# Patient Record
Sex: MALE | Race: BLACK OR AFRICAN AMERICAN | Employment: OTHER | URBAN - METROPOLITAN AREA
[De-identification: names, ages, dates, MRNs, and addresses within clinical notes are randomized per-mention and may not be internally consistent; named-entity substitution may affect disease eponyms.]

---

## 2018-05-16 ENCOUNTER — HOSPITAL ENCOUNTER (OUTPATIENT)
Dept: VASCULAR SURGERY | Age: 48
Discharge: HOME | End: 2018-05-16
Attending: PODIATRIST
Payer: MEDICARE

## 2018-05-16 ENCOUNTER — HOSPITAL ENCOUNTER (OUTPATIENT)
Dept: GENERAL RADIOLOGY | Age: 48
Discharge: HOME | End: 2018-05-16
Attending: PODIATRIST
Payer: MEDICARE

## 2018-05-16 DIAGNOSIS — E11.59 DIABETIC VASCULOPATHY (HCC): ICD-10-CM

## 2018-05-16 DIAGNOSIS — M21.371 FOOT DROP, RIGHT: ICD-10-CM

## 2018-05-16 PROCEDURE — 93923 UPR/LXTR ART STDY 3+ LVLS: CPT

## 2018-05-16 PROCEDURE — 73610 X-RAY EXAM OF ANKLE: CPT

## 2018-05-16 PROCEDURE — 73630 X-RAY EXAM OF FOOT: CPT

## 2018-05-16 NOTE — PROCEDURES
[facility]  *** FINAL REPORT ***    Name: Reinaldo Dunaway  MRN: [id number]    Outpatient  : 20 Sep 1970  HIS Order #: [id number]  [id number] [facility] Visit #: [id number]  Date: 16 May 2018    TYPE OF TEST: Peripheral Arterial Testing    REASON FOR TEST    Right Leg  Segmentals: Normal                     mmHg  Brachial  High thigh  Low thigh  Calf             240  Posterior tibial 230  Dorsalis pedis   240  Peroneal  Metatarsal  Toe pressure  Doppler:    Normal  Ankle/Brachial: 1.16    Left Leg  Segmentals: Normal                     mmHg  Brachial         207  High thigh  Low thigh  Calf             211  Posterior tibial 211  Dorsalis pedis   208  Peroneal  Metatarsal  Toe pressure  Doppler:    Normal  Ankle/Brachial: 1.02    INTERPRETATION/FINDINGS  1. No evidence of significant peripheral arterial disease at rest in  the right leg. 2. No evidence of significant peripheral arterial disease at rest in  the left leg. 3. The right ankle/brachial index is 1.16 and the left ankle/brachial  index is 1.02.    ADDITIONAL COMMENTS  Right brachial pressure was not taken due to paralysis. I have personally reviewed the data relevant to the interpretation of  this  study. TECHNOLOGIST: Santi Reyes, [facility], RVT/  Signed: 2018 11:50 AM    PHYSICIAN: Bahman Zuñiga.  Cecilia Tran MD  Signed: 2018 04:12 PM

## 2019-01-18 ENCOUNTER — APPOINTMENT (OUTPATIENT)
Dept: CT IMAGING | Age: 49
End: 2019-01-18
Attending: PHYSICIAN ASSISTANT
Payer: MEDICARE

## 2019-01-18 ENCOUNTER — HOSPITAL ENCOUNTER (EMERGENCY)
Age: 49
Discharge: HOME | End: 2019-01-18
Attending: EMERGENCY MEDICINE
Payer: MEDICARE

## 2019-01-18 VITALS
HEART RATE: 93 BPM | OXYGEN SATURATION: 97 % | DIASTOLIC BLOOD PRESSURE: 111 MMHG | HEIGHT: 71 IN | RESPIRATION RATE: 16 BRPM | TEMPERATURE: 98.4 F | SYSTOLIC BLOOD PRESSURE: 182 MMHG | WEIGHT: 240 LBS | BODY MASS INDEX: 33.6 KG/M2

## 2019-01-18 DIAGNOSIS — R10.13 ABDOMINAL PAIN, EPIGASTRIC: Primary | ICD-10-CM

## 2019-01-18 LAB
ALBUMIN SERPL-MCNC: 4.2 G/DL (ref 3.4–5)
ALBUMIN/GLOB SERPL: 0.9 {RATIO} (ref 0.8–1.7)
ALP SERPL-CCNC: 75 U/L (ref 45–117)
ALT SERPL-CCNC: 65 U/L (ref 16–61)
ANION GAP SERPL CALC-SCNC: 11 MMOL/L (ref 3–18)
AST SERPL-CCNC: 334 U/L (ref 15–37)
BASOPHILS # BLD: 0 K/UL (ref 0–0.1)
BASOPHILS NFR BLD: 0 % (ref 0–2)
BILIRUB SERPL-MCNC: 1.8 MG/DL (ref 0.2–1)
BUN SERPL-MCNC: 17 MG/DL (ref 7–18)
BUN/CREAT SERPL: 11 (ref 12–20)
CALCIUM SERPL-MCNC: 9.7 MG/DL (ref 8.5–10.1)
CHLORIDE SERPL-SCNC: 98 MMOL/L (ref 100–108)
CO2 SERPL-SCNC: 26 MMOL/L (ref 21–32)
CREAT SERPL-MCNC: 1.59 MG/DL (ref 0.6–1.3)
DIFFERENTIAL METHOD BLD: ABNORMAL
EOSINOPHIL # BLD: 0 K/UL (ref 0–0.4)
EOSINOPHIL NFR BLD: 0 % (ref 0–5)
ERYTHROCYTE [DISTWIDTH] IN BLOOD BY AUTOMATED COUNT: 13.6 % (ref 11.6–14.5)
GLOBULIN SER CALC-MCNC: 4.6 G/DL (ref 2–4)
GLUCOSE SERPL-MCNC: 255 MG/DL (ref 74–99)
HCT VFR BLD AUTO: 44.8 % (ref 36–48)
HGB BLD-MCNC: 15.4 G/DL (ref 13–16)
LYMPHOCYTES # BLD: 1.1 K/UL (ref 0.9–3.6)
LYMPHOCYTES NFR BLD: 9 % (ref 21–52)
MCH RBC QN AUTO: 28.9 PG (ref 24–34)
MCHC RBC AUTO-ENTMCNC: 34.4 G/DL (ref 31–37)
MCV RBC AUTO: 84.2 FL (ref 74–97)
MONOCYTES # BLD: 1.1 K/UL (ref 0.05–1.2)
MONOCYTES NFR BLD: 9 % (ref 3–10)
NEUTS SEG # BLD: 9.8 K/UL (ref 1.8–8)
NEUTS SEG NFR BLD: 82 % (ref 40–73)
PLATELET # BLD AUTO: 216 K/UL (ref 135–420)
PMV BLD AUTO: 11.2 FL (ref 9.2–11.8)
POTASSIUM SERPL-SCNC: 3.9 MMOL/L (ref 3.5–5.5)
PROT SERPL-MCNC: 8.8 G/DL (ref 6.4–8.2)
RBC # BLD AUTO: 5.32 M/UL (ref 4.7–5.5)
SODIUM SERPL-SCNC: 135 MMOL/L (ref 136–145)
WBC # BLD AUTO: 12 K/UL (ref 4.6–13.2)

## 2019-01-18 PROCEDURE — 96374 THER/PROPH/DIAG INJ IV PUSH: CPT

## 2019-01-18 PROCEDURE — 85025 COMPLETE CBC W/AUTO DIFF WBC: CPT

## 2019-01-18 PROCEDURE — 74011250636 HC RX REV CODE- 250/636: Performed by: EMERGENCY MEDICINE

## 2019-01-18 PROCEDURE — 99284 EMERGENCY DEPT VISIT MOD MDM: CPT

## 2019-01-18 PROCEDURE — 80053 COMPREHEN METABOLIC PANEL: CPT

## 2019-01-18 PROCEDURE — 96361 HYDRATE IV INFUSION ADD-ON: CPT

## 2019-01-18 PROCEDURE — 74011000250 HC RX REV CODE- 250: Performed by: EMERGENCY MEDICINE

## 2019-01-18 PROCEDURE — 74176 CT ABD & PELVIS W/O CONTRAST: CPT

## 2019-01-18 RX ORDER — METOPROLOL TARTRATE 5 MG/5ML
5 INJECTION INTRAVENOUS
Status: COMPLETED | OUTPATIENT
Start: 2019-01-18 | End: 2019-01-18

## 2019-01-18 RX ORDER — AMLODIPINE AND OLMESARTAN MEDOXOMIL 10; 40 MG/1; MG/1
TABLET ORAL
COMMUNITY
End: 2022-02-04

## 2019-01-18 RX ADMIN — METOPROLOL TARTRATE 5 MG: 1 INJECTION, SOLUTION INTRAVENOUS at 16:13

## 2019-01-18 RX ADMIN — SODIUM CHLORIDE 1000 ML: 900 INJECTION, SOLUTION INTRAVENOUS at 14:41

## 2019-01-18 NOTE — ED NOTES
Dr. Tamara Canchola reviewed verbal discharge instructions with the patient and spouse. The patient and spouse verbalized understanding. Pt left prior to receiving paper discharge instructions.

## 2019-01-18 NOTE — DISCHARGE INSTRUCTIONS
Patient Education      Take your prescribed medication as directed. Follow up with your primary care physician. Return to the emergency room with any new or worsening conditions. Patient Education          Diarrhea: Care Instructions  Your Care Instructions    Diarrhea is loose, watery stools (bowel movements). The exact cause is often hard to find. Sometimes diarrhea is your body's way of getting rid of what caused an upset stomach. Viruses, food poisoning, and many medicines can cause diarrhea. Some people get diarrhea in response to emotional stress, anxiety, or certain foods. Almost everyone has diarrhea now and then. It usually isn't serious, and your stools will return to normal soon. The important thing to do is replace the fluids you have lost, so you can prevent dehydration. The doctor has checked you carefully, but problems can develop later. If you notice any problems or new symptoms, get medical treatment right away. Follow-up care is a key part of your treatment and safety. Be sure to make and go to all appointments, and call your doctor if you are having problems. It's also a good idea to know your test results and keep a list of the medicines you take. How can you care for yourself at home? · Watch for signs of dehydration, which means your body has lost too much water. Dehydration is a serious condition and should be treated right away. Signs of dehydration are:  ? Increasing thirst and dry eyes and mouth. ? Feeling faint or lightheaded. ? Darker urine, and a smaller amount of urine than normal.  · To prevent dehydration, drink plenty of fluids, enough so that your urine is light yellow or clear like water. Choose water and other caffeine-free clear liquids until you feel better. If you have kidney, heart, or liver disease and have to limit fluids, talk with your doctor before you increase the amount of fluids you drink.   · Begin eating small amounts of mild foods the next day, if you feel like it. ? Try yogurt that has live cultures of Lactobacillus. (Check the label.)  ? Avoid spicy foods, fruits, alcohol, and caffeine until 48 hours after all symptoms are gone. ? Avoid chewing gum that contains sorbitol. ? Avoid dairy products (except for yogurt with Lactobacillus) while you have diarrhea and for 3 days after symptoms are gone. · The doctor may recommend that you take over-the-counter medicine, such as loperamide (Imodium), if you still have diarrhea after 6 hours. Read and follow all instructions on the label. Do not use this medicine if you have bloody diarrhea, a high fever, or other signs of serious illness. Call your doctor if you think you are having a problem with your medicine. When should you call for help? Call 911 anytime you think you may need emergency care. For example, call if:    · You passed out (lost consciousness).     · Your stools are maroon or very bloody.    Call your doctor now or seek immediate medical care if:    · You are dizzy or lightheaded, or you feel like you may faint.     · Your stools are black and look like tar, or they have streaks of blood.     · You have new or worse belly pain.     · You have symptoms of dehydration, such as:  ? Dry eyes and a dry mouth. ? Passing only a little dark urine. ? Feeling thirstier than usual.     · You have a new or higher fever.    Watch closely for changes in your health, and be sure to contact your doctor if:    · Your diarrhea is getting worse.     · You see pus in the diarrhea.     · You are not getting better after 2 days (48 hours). Where can you learn more? Go to http://tamir-eren.info/. Enter W694 in the search box to learn more about \"Diarrhea: Care Instructions. \"  Current as of: September 23, 2018  Content Version: 11.9  © 6801-6757 [employer]. Care instructions adapted under license by [employer] (which disclaims liability or warranty for this information).  If you have questions about a medical condition or this instruction, always ask your healthcare professional. [address] any warranty or liability for your use of this information. Abdominal Pain: Care Instructions  Your Care Instructions    Abdominal pain has many possible causes. Some aren't serious and get better on their own in a few days. Others need more testing and treatment. If your pain continues or gets worse, you need to be rechecked and may need more tests to find out what is wrong. You may need surgery to correct the problem. Don't ignore new symptoms, such as fever, nausea and vomiting, urination problems, pain that gets worse, and dizziness. These may be signs of a more serious problem. Your doctor may have recommended a follow-up visit in the next 8 to 12 hours. If you are not getting better, you may need more tests or treatment. The doctor has checked you carefully, but problems can develop later. If you notice any problems or new symptoms, get medical treatment right away. Follow-up care is a key part of your treatment and safety. Be sure to make and go to all appointments, and call your doctor if you are having problems. It's also a good idea to know your test results and keep a list of the medicines you take. How can you care for yourself at home? · Rest until you feel better. · To prevent dehydration, drink plenty of fluids, enough so that your urine is light yellow or clear like water. Choose water and other caffeine-free clear liquids until you feel better. If you have kidney, heart, or liver disease and have to limit fluids, talk with your doctor before you increase the amount of fluids you drink. · If your stomach is upset, eat mild foods, such as rice, dry toast or crackers, bananas, and applesauce. Try eating several small meals instead of two or three large ones.   · Wait until 48 hours after all symptoms have gone away before you have spicy foods, alcohol, and drinks that contain caffeine. · Do not eat foods that are high in fat. · Avoid anti-inflammatory medicines such as aspirin, ibuprofen (Advil, Motrin), and naproxen (Aleve). These can cause stomach upset. Talk to your doctor if you take daily aspirin for another health problem. When should you call for help? Call 911 anytime you think you may need emergency care. For example, call if:    · You passed out (lost consciousness).     · You pass maroon or very bloody stools.     · You vomit blood or what looks like coffee grounds.     · You have new, severe belly pain.    Call your doctor now or seek immediate medical care if:    · Your pain gets worse, especially if it becomes focused in one area of your belly.     · You have a new or higher fever.     · Your stools are black and look like tar, or they have streaks of blood.     · You have unexpected vaginal bleeding.     · You have symptoms of a urinary tract infection. These may include:  ? Pain when you urinate. ? Urinating more often than usual.  ? Blood in your urine.     · You are dizzy or lightheaded, or you feel like you may faint.    Watch closely for changes in your health, and be sure to contact your doctor if:    · You are not getting better after 1 day (24 hours). Where can you learn more? Go to http://tamir-eren.info/. Enter J275 in the search box to learn more about \"Abdominal Pain: Care Instructions. \"  Current as of: September 23, 2018  Content Version: 11.9  © 5706-2746 [employer]. Care instructions adapted under license by [employer] (which disclaims liability or warranty for this information). If you have questions about a medical condition or this instruction, always ask your healthcare professional. [address] any warranty or liability for your use of this information.

## 2019-01-18 NOTE — ED PROVIDER NOTES
EMERGENCY DEPARTMENT HISTORY AND PHYSICAL EXAM    2:29 PM      Date: 1/18/2019  Patient Name: Davie Almeida    History of Presenting Illness     Chief Complaint   Patient presents with    Abdominal Pain    Vomiting    Diarrhea         History Provided By: Patient and Patient's Wife    Chief Complaint: abdominal pain  Duration: 2 Days  Timing:  Acute and Constant  Location: epigastric  Quality: Aching  Severity: 6 out of 10  Modifying Factors: none  Associated Symptoms: N/V/D      Additional History (Context): Davie Almeida is a 50 y.o. male with CVA, DM, HTN, HLD who presents with acute and constant epigastric abdominal pain rated a 6/10 onset x2 days PTA. He confirms associated sx of N/V/D with 8 episodes of water diarrhea and 3 episodes of emesis yesterday; no episodes of diarrhea and 2 episodes of emesis today. He denies any CP, cough, fever or urinary sx. The epigastric pain is non radiating and does not have any modifying factors. Pt wife has been trying to keep him hydrated and states that he has not taken his medications today but is otherwise compliant. He denies any tobacco, etOH or illicit drug use. PCP is Dr. Rolly Boswell. No other concerns or symptoms at this time. PCP: None    Current Outpatient Medications   Medication Sig Dispense Refill    amLODIPine-Olmesartan 10-40 mg tab Take  by mouth.  SITagliptin-metFORMIN (JANUMET) 50-1,000 mg per tablet Take 1 Tab by mouth two (2) times daily (with meals).  metoprolol (LOPRESSOR) 50 mg tablet Take 1 Tab by mouth two (2) times a day. 60 Tab 1    HYDROcodone-acetaminophen (NORCO) 5-325 mg per tablet Take 1 Tab by mouth every four (4) hours as needed for Pain.  20 Tab 0       Past History     Past Medical History:  Past Medical History:   Diagnosis Date    CVA (cerebral vascular accident) ([facility] [address]     right-sided deficit    Diabetes ([address]     HTN (hypertension)        Past Surgical History:  Past Surgical History:   Procedure Laterality Date    HX HIP FRACTURE TX      left    HX ORTHOPAEDIC      hip       Family History:  Family History   Problem Relation Age of Onset    Stroke Neg Hx     Hypertension Neg Hx     Heart Disease Neg Hx     Diabetes Neg Hx     Cancer Neg Hx        Social History:  Social History     Tobacco Use    Smoking status: Never Smoker    Smokeless tobacco: Never Used   Substance Use Topics    Alcohol use: No    Drug use: No       Allergies: Allergies   Allergen Reactions    Grape Hives         Review of Systems       Review of Systems   Constitutional: Negative. Negative for chills, diaphoresis and fever. HENT: Negative. Negative for congestion, rhinorrhea and sore throat. Eyes: Negative. Negative for pain, discharge and redness. Respiratory: Negative. Negative for cough, chest tightness, shortness of breath and wheezing. Cardiovascular: Negative. Negative for chest pain. Gastrointestinal: Positive for abdominal pain (epigastric), diarrhea, nausea and vomiting. Negative for constipation. Genitourinary: Negative. Negative for dysuria, flank pain, frequency, hematuria and urgency. Musculoskeletal: Negative. Negative for back pain and neck pain. Skin: Negative. Negative for rash. Neurological: Negative. Negative for syncope, weakness, numbness and headaches. Psychiatric/Behavioral: Negative. All other systems reviewed and are negative. Physical Exam     Visit Vitals  BP (!) 182/111   Pulse 93   Temp 98.4 °F (36.9 °C)   Resp 16   Ht 5' 11\" (1.803 m)   Wt 108.9 kg (240 lb)   SpO2 97%   BMI 33.47 kg/m²         Physical Exam   Constitutional: He appears well-developed and well-nourished. Non-toxic appearance. He does not have a sickly appearance. He does not appear ill. No distress. HENT:   Head: Normocephalic and atraumatic. Mouth/Throat: Oropharynx is clear and moist. No oropharyngeal exudate. Eyes: Conjunctivae and EOM are normal. Pupils are equal, round, and reactive to light.  No scleral icterus. Neck: Normal range of motion. Neck supple. No hepatojugular reflux and no JVD present. No tracheal deviation present. No thyromegaly present. Cardiovascular: Normal rate, regular rhythm, S1 normal, S2 normal, normal heart sounds, intact distal pulses and normal pulses. Exam reveals no gallop, no S3 and no S4. No murmur heard. Pulses:       Radial pulses are 2+ on the right side, and 2+ on the left side. Dorsalis pedis pulses are 2+ on the right side, and 2+ on the left side. Pulmonary/Chest: Effort normal and breath sounds normal. No respiratory distress. He has no decreased breath sounds. He has no wheezes. He has no rhonchi. He has no rales. Abdominal: Soft. Normal appearance and bowel sounds are normal. He exhibits no distension and no mass. There is no hepatosplenomegaly. There is tenderness in the epigastric area. There is no rigidity, no rebound, no guarding, no CVA tenderness, no tenderness at McBurney's point and negative Vargas's sign. Musculoskeletal: Normal range of motion. Right 1/5 strength upper extremity  Right 2/5 strength lower extremity   Left sided 5/5 upper and lower    Lymphadenopathy:        Head (right side): No submental, no submandibular, no preauricular and no occipital adenopathy present. Head (left side): No submental, no submandibular, no preauricular and no occipital adenopathy present. He has no cervical adenopathy. Right: No supraclavicular adenopathy present. Left: No supraclavicular adenopathy present. Neurological: He is alert. He has normal reflexes. He is not disoriented. He displays atrophy. A sensory deficit is present. No cranial nerve deficit. Coordination and gait normal. GCS eye subscore is 4. GCS verbal subscore is 5. GCS motor subscore is 6. 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 No rash noted. He is not diaphoretic. Psychiatric: He has a normal mood and affect. His speech is normal and behavior is normal. Judgment and thought content normal. Cognition and memory are normal.   Nursing note and vitals reviewed. Diagnostic Study Results     Labs -  Recent Results (from the past 12 hour(s))   CBC WITH AUTOMATED DIFF    Collection Time: 01/18/19  2:35 PM   Result Value Ref Range    WBC 12.0 4.6 - 13.2 K/uL    RBC 5.32 4.70 - 5.50 M/uL    HGB 15.4 13.0 - 16.0 g/dL    HCT 44.8 36.0 - 48.0 %    MCV 84.2 74.0 - 97.0 FL    MCH 28.9 24.0 - 34.0 PG    MCHC 34.4 31.0 - 37.0 g/dL    RDW 13.6 11.6 - 14.5 %    PLATELET 374 914 - 637 K/uL    MPV 11.2 9.2 - 11.8 FL    NEUTROPHILS 82 (H) 40 - 73 %    LYMPHOCYTES 9 (L) 21 - 52 %    MONOCYTES 9 3 - 10 %    EOSINOPHILS 0 0 - 5 %    BASOPHILS 0 0 - 2 %    ABS. NEUTROPHILS 9.8 (H) 1.8 - 8.0 K/UL    ABS. LYMPHOCYTES 1.1 0.9 - 3.6 K/UL    ABS. MONOCYTES 1.1 0.05 - 1.2 K/UL    ABS. EOSINOPHILS 0.0 0.0 - 0.4 K/UL    ABS. BASOPHILS 0.0 0.0 - 0.1 K/UL    DF AUTOMATED     METABOLIC PANEL, COMPREHENSIVE    Collection Time: 01/18/19  2:35 PM   Result Value Ref Range    Sodium 135 (L) 136 - 145 mmol/L    Potassium 3.9 3.5 - 5.5 mmol/L    Chloride 98 (L) 100 - 108 mmol/L    CO2 26 21 - 32 mmol/L    Anion gap 11 3.0 - 18 mmol/L    Glucose 255 (H) 74 - 99 mg/dL    BUN 17 7.0 - 18 MG/DL    Creatinine 1.59 (H) 0.6 - 1.3 MG/DL    BUN/Creatinine ratio 11 (L) 12 - 20      GFR est AA 57 (L) >60 ml/min/1.73m2    GFR est non-AA 47 (L) >60 ml/min/1.73m2    Calcium 9.7 8.5 - 10.1 MG/DL    Bilirubin, total 1.8 (H) 0.2 - 1.0 MG/DL    ALT (SGPT) 65 (H) 16 - 61 U/L    AST (SGOT) 334 (H) 15 - 37 U/L    Alk. phosphatase 75 45 - 117 U/L    Protein, total 8.8 (H) 6.4 - 8.2 g/dL    Albumin 4.2 3.4 - 5.0 g/dL    Globulin 4.6 (H) 2.0 - 4.0 g/dL    A-G Ratio 0.9 0.8 - 1.7         Radiologic Studies -   CT ABD PELV WO CONT   Final Result   IMPRESSION:      No clearly acute findings.       Sigmoid diverticulosis with perhaps mild sigmoid wall thickening, may be   exaggerated by nondistention, possibly sequela of chronic diverticulosis. Appendix normal.      Equivocal bladder wall thickening, likely exaggerated by nondistention. Correlate clinically. Very few subtle hazy groundglass densities in the right lung base, possibly   subtle inflammatory infiltrates. Recommend follow-up CT thorax in 3 months to   reassess. Bilateral diffuse appearing adrenal gland thickening without well-defined   nodules. Small fat-containing inguinal hernias. Bony findings including left acetabular   prior hardware fixation and severe left hip degenerative changes, as well as   bilateral L5 pars defects with associated grade 1 anterolisthesis. See details above. Medical Decision Making   Provider Notes (Medical Decision Making):  MDM  Number of Diagnoses or Management Options  Abdominal pain, epigastric:   Diagnosis management comments: DDX: Gastritis, gerd, peptic ulcer disease, cholecystitis, pancreatitis, gastroenteritis, hepatitis, constipation related pain, appendicitis pain, diverticulitis, urinary tract infection, obstruction, abdominal wall pain, atypical cardiac (ami or anginal pain), referred pain from pulmonary process (pneumonia, empyema),  or combination of the above versus many other processes. I am the first provider for this patient. I reviewed the vital signs, available nursing notes, past medical history, past surgical history, family history and social history. Vital Signs-Reviewed the patient's vital signs. Records Reviewed: Nursing Notes (Time of Review: 2:29 PM)    ED Course: Progress Notes, Reevaluation, and Consults:    Labs essentially normal with the exception of mild elevation of ALT. Large elevation of AST. CT abdomen showed: No clearly acute findings.     Sigmoid diverticulosis with perhaps mild sigmoid wall thickening, may be  exaggerated by nondistention, possibly sequela of chronic diverticulosis.   Appendix normal.     Equivocal bladder wall thickening, likely exaggerated by nondistention. Correlate clinically.     Very few subtle hazy groundglass densities in the right lung base, possibly  subtle inflammatory infiltrates. Recommend follow-up CT thorax in 3 months to  reassess.     Bilateral diffuse appearing adrenal gland thickening without well-defined  nodules.     Small fat-containing inguinal hernias. Bony findings including left acetabular  prior hardware fixation and severe left hip degenerative changes, as well as  bilateral L5 pars defects with associated grade 1 anterolisthesis.     4:58 PM 1/18/2019      Diagnosis     I have reassessed the patient. Patient is feeling better. Patient was discharged in stable condition. Patient is to return to emergency department if any new or worsening condition. Clinical Impression:   1. Abdominal pain, epigastric        Disposition: Discharge- Home    Follow-up Information     Follow up With Specialties Details Why Contact Info    Melody Lopez MD [facility] Schedule an appointment as soon as possible for a visit in 3 days For recheck of ongoing symptoms [address]  [phone]             _______________________________    Attestations:  [place] [address] acting as a scribe for and in the presence of Baldev Blanco DO      January 18, 2019 at 2:29 PM       Provider Attestation:      I personally performed the services described in the documentation, reviewed the documentation, as recorded by the scribe in my presence, and it accurately and completely records my words and actions.  January 18, 2019 at 2:29 PM - Baldev Mendez DO    _______________________________

## 2019-08-01 ENCOUNTER — HOSPITAL ENCOUNTER (OUTPATIENT)
Dept: LAB | Age: 49
Discharge: HOME | End: 2019-08-01
Payer: MEDICARE

## 2019-08-01 ENCOUNTER — HOME HEALTH ADMISSION (OUTPATIENT)
Dept: HOME HEALTH SERVICES | Facility: HOME HEALTH | Age: 49
End: 2019-08-01

## 2019-08-01 LAB
ANION GAP SERPL CALC-SCNC: 6 MMOL/L (ref 3–18)
BASOPHILS # BLD: 0 K/UL (ref 0–0.1)
BASOPHILS NFR BLD: 0 % (ref 0–2)
BUN SERPL-MCNC: 34 MG/DL (ref 7–18)
BUN/CREAT SERPL: 23 (ref 12–20)
CALCIUM SERPL-MCNC: 9.4 MG/DL (ref 8.5–10.1)
CHLORIDE SERPL-SCNC: 102 MMOL/L (ref 100–111)
CO2 SERPL-SCNC: 30 MMOL/L (ref 21–32)
CREAT SERPL-MCNC: 1.48 MG/DL (ref 0.6–1.3)
DIFFERENTIAL METHOD BLD: ABNORMAL
EOSINOPHIL # BLD: 0 K/UL (ref 0–0.4)
EOSINOPHIL NFR BLD: 0 % (ref 0–5)
ERYTHROCYTE [DISTWIDTH] IN BLOOD BY AUTOMATED COUNT: 13.6 % (ref 11.6–14.5)
GLUCOSE SERPL-MCNC: 132 MG/DL (ref 74–99)
HCT VFR BLD AUTO: 35 % (ref 36–48)
HGB BLD-MCNC: 11.6 G/DL (ref 13–16)
LYMPHOCYTES # BLD: 1.5 K/UL (ref 0.9–3.6)
LYMPHOCYTES NFR BLD: 20 % (ref 21–52)
MCH RBC QN AUTO: 28.9 PG (ref 24–34)
MCHC RBC AUTO-ENTMCNC: 33.1 G/DL (ref 31–37)
MCV RBC AUTO: 87.1 FL (ref 74–97)
MONOCYTES # BLD: 0.5 K/UL (ref 0.05–1.2)
MONOCYTES NFR BLD: 7 % (ref 3–10)
NEUTS SEG # BLD: 5.3 K/UL (ref 1.8–8)
NEUTS SEG NFR BLD: 73 % (ref 40–73)
PLATELET # BLD AUTO: 264 K/UL (ref 135–420)
PMV BLD AUTO: 11.7 FL (ref 9.2–11.8)
POTASSIUM SERPL-SCNC: 4.1 MMOL/L (ref 3.5–5.5)
RBC # BLD AUTO: 4.02 M/UL (ref 4.7–5.5)
SODIUM SERPL-SCNC: 138 MMOL/L (ref 136–145)
WBC # BLD AUTO: 7.4 K/UL (ref 4.6–13.2)

## 2019-08-01 PROCEDURE — 36415 COLL VENOUS BLD VENIPUNCTURE: CPT

## 2019-08-01 PROCEDURE — 85025 COMPLETE CBC W/AUTO DIFF WBC: CPT

## 2019-08-01 PROCEDURE — 80048 BASIC METABOLIC PNL TOTAL CA: CPT

## 2019-08-02 ENCOUNTER — HOME CARE VISIT (OUTPATIENT)
Dept: HOME HEALTH SERVICES | Facility: HOME HEALTH | Age: 49
End: 2019-08-02

## 2020-02-17 ENCOUNTER — HOSPITAL ENCOUNTER (OUTPATIENT)
Dept: LAB | Age: 50
Discharge: HOME | End: 2020-02-17

## 2020-02-17 LAB — XX-LABCORP SPECIMEN COL,LCBCF: NORMAL

## 2020-02-17 PROCEDURE — 99001 SPECIMEN HANDLING PT-LAB: CPT

## 2022-01-09 ENCOUNTER — APPOINTMENT (OUTPATIENT)
Dept: GENERAL RADIOLOGY | Age: 52
DRG: 177 | End: 2022-01-09
Attending: STUDENT IN AN ORGANIZED HEALTH CARE EDUCATION/TRAINING PROGRAM
Payer: MEDICARE

## 2022-01-09 ENCOUNTER — HOSPITAL ENCOUNTER (INPATIENT)
Age: 52
LOS: 25 days | Discharge: NURSING, REHAB OR LONG-TERM CARE | DRG: 177 | End: 2022-02-04
Attending: STUDENT IN AN ORGANIZED HEALTH CARE EDUCATION/TRAINING PROGRAM | Admitting: HOSPITALIST
Payer: MEDICARE

## 2022-01-09 DIAGNOSIS — J96.01 ACUTE RESPIRATORY FAILURE WITH HYPOXIA (HCC): ICD-10-CM

## 2022-01-09 DIAGNOSIS — J12.82 PNEUMONIA DUE TO COVID-19 VIRUS: ICD-10-CM

## 2022-01-09 DIAGNOSIS — N17.9 AKI (ACUTE KIDNEY INJURY) (HCC): ICD-10-CM

## 2022-01-09 DIAGNOSIS — U07.1 COVID: Primary | ICD-10-CM

## 2022-01-09 DIAGNOSIS — D68.69 HYPERCOAGULABLE STATE ASSOCIATED WITH COVID-19 (HCC): ICD-10-CM

## 2022-01-09 DIAGNOSIS — I82.4Z3 ACUTE DEEP VEIN THROMBOSIS (DVT) OF DISTAL VEIN OF BOTH LOWER EXTREMITIES (HCC): ICD-10-CM

## 2022-01-09 DIAGNOSIS — U07.1 HYPERCOAGULABLE STATE ASSOCIATED WITH COVID-19 (HCC): ICD-10-CM

## 2022-01-09 DIAGNOSIS — U07.1 PNEUMONIA DUE TO COVID-19 VIRUS: ICD-10-CM

## 2022-01-09 LAB
ALBUMIN SERPL-MCNC: 2.8 G/DL (ref 3.4–5)
ALBUMIN/GLOB SERPL: 0.5 {RATIO} (ref 0.8–1.7)
ALP SERPL-CCNC: 41 U/L (ref 45–117)
ALT SERPL-CCNC: 88 U/L (ref 16–61)
ANION GAP SERPL CALC-SCNC: 10 MMOL/L (ref 3–18)
AST SERPL-CCNC: 161 U/L (ref 10–38)
BASOPHILS # BLD: 0 K/UL (ref 0–0.1)
BASOPHILS NFR BLD: 0 % (ref 0–2)
BILIRUB SERPL-MCNC: 1.2 MG/DL (ref 0.2–1)
BNP SERPL-MCNC: 1666 PG/ML (ref 0–900)
BUN SERPL-MCNC: 43 MG/DL (ref 7–18)
BUN/CREAT SERPL: 17 (ref 12–20)
CALCIUM SERPL-MCNC: 8.2 MG/DL (ref 8.5–10.1)
CHLORIDE SERPL-SCNC: 104 MMOL/L (ref 100–111)
CO2 SERPL-SCNC: 24 MMOL/L (ref 21–32)
COVID-19 RAPID TEST, COVR: DETECTED
CREAT SERPL-MCNC: 2.49 MG/DL (ref 0.6–1.3)
D DIMER PPP FEU-MCNC: 2.6 UG/ML(FEU)
DIFFERENTIAL METHOD BLD: ABNORMAL
EOSINOPHIL # BLD: 0 K/UL (ref 0–0.4)
EOSINOPHIL NFR BLD: 0 % (ref 0–5)
ERYTHROCYTE [DISTWIDTH] IN BLOOD BY AUTOMATED COUNT: 14 % (ref 11.6–14.5)
FERRITIN SERPL-MCNC: 1426 NG/ML (ref 8–388)
FIBRINOGEN PPP-MCNC: 633 MG/DL (ref 210–451)
GLOBULIN SER CALC-MCNC: 5.1 G/DL (ref 2–4)
GLUCOSE SERPL-MCNC: 312 MG/DL (ref 74–99)
HCT VFR BLD AUTO: 42 % (ref 36–48)
HGB BLD-MCNC: 13.7 G/DL (ref 13–16)
IMM GRANULOCYTES # BLD AUTO: 0.1 K/UL (ref 0–0.04)
IMM GRANULOCYTES NFR BLD AUTO: 1 % (ref 0–0.5)
INR PPP: 1 (ref 0.8–1.2)
LDH SERPL L TO P-CCNC: 1046 U/L (ref 81–234)
LYMPHOCYTES # BLD: 0.7 K/UL (ref 0.9–3.6)
LYMPHOCYTES NFR BLD: 11 % (ref 21–52)
MAGNESIUM SERPL-MCNC: 2 MG/DL (ref 1.6–2.6)
MCH RBC QN AUTO: 27.7 PG (ref 24–34)
MCHC RBC AUTO-ENTMCNC: 32.6 G/DL (ref 31–37)
MCV RBC AUTO: 85 FL (ref 78–100)
MONOCYTES # BLD: 0.3 K/UL (ref 0.05–1.2)
MONOCYTES NFR BLD: 5 % (ref 3–10)
NEUTS SEG # BLD: 5 K/UL (ref 1.8–8)
NEUTS SEG NFR BLD: 83 % (ref 40–73)
NRBC # BLD: 0 K/UL (ref 0–0.01)
NRBC BLD-RTO: 0 PER 100 WBC
PLATELET # BLD AUTO: 105 K/UL (ref 135–420)
PMV BLD AUTO: 13.1 FL (ref 9.2–11.8)
POTASSIUM SERPL-SCNC: 5 MMOL/L (ref 3.5–5.5)
PROCALCITONIN SERPL-MCNC: 0.88 NG/ML
PROT SERPL-MCNC: 7.9 G/DL (ref 6.4–8.2)
PROTHROMBIN TIME: 13.5 SEC (ref 11.5–15.2)
RBC # BLD AUTO: 4.94 M/UL (ref 4.35–5.65)
SODIUM SERPL-SCNC: 138 MMOL/L (ref 136–145)
SOURCE, COVRS: ABNORMAL
TROPONIN-HIGH SENSITIVITY: 147 NG/L (ref 0–78)
WBC # BLD AUTO: 6 K/UL (ref 4.6–13.2)

## 2022-01-09 PROCEDURE — 84484 ASSAY OF TROPONIN QUANT: CPT

## 2022-01-09 PROCEDURE — 85025 COMPLETE CBC W/AUTO DIFF WBC: CPT

## 2022-01-09 PROCEDURE — 85610 PROTHROMBIN TIME: CPT

## 2022-01-09 PROCEDURE — 80053 COMPREHEN METABOLIC PANEL: CPT

## 2022-01-09 PROCEDURE — 87635 SARS-COV-2 COVID-19 AMP PRB: CPT

## 2022-01-09 PROCEDURE — 93005 ELECTROCARDIOGRAM TRACING: CPT

## 2022-01-09 PROCEDURE — 99285 EMERGENCY DEPT VISIT HI MDM: CPT

## 2022-01-09 PROCEDURE — 83615 LACTATE (LD) (LDH) ENZYME: CPT

## 2022-01-09 PROCEDURE — 71045 X-RAY EXAM CHEST 1 VIEW: CPT

## 2022-01-09 PROCEDURE — 83735 ASSAY OF MAGNESIUM: CPT

## 2022-01-09 PROCEDURE — 82728 ASSAY OF FERRITIN: CPT

## 2022-01-09 PROCEDURE — 84145 PROCALCITONIN (PCT): CPT

## 2022-01-09 PROCEDURE — 83880 ASSAY OF NATRIURETIC PEPTIDE: CPT

## 2022-01-09 PROCEDURE — 85379 FIBRIN DEGRADATION QUANT: CPT

## 2022-01-09 PROCEDURE — 85384 FIBRINOGEN ACTIVITY: CPT

## 2022-01-09 RX ORDER — DEXAMETHASONE SODIUM PHOSPHATE 4 MG/ML
10 INJECTION, SOLUTION INTRA-ARTICULAR; INTRALESIONAL; INTRAMUSCULAR; INTRAVENOUS; SOFT TISSUE ONCE
Status: COMPLETED | OUTPATIENT
Start: 2022-01-09 | End: 2022-01-10

## 2022-01-09 NOTE — Clinical Note
Status[de-identified] INPATIENT [101]   Type of Bed: Stepdown [17]   Cardiac Monitoring Required?: Yes   Inpatient Hospitalization Certified Necessary for the Following Reasons: 3.  Patient receiving treatment that can only be provided in an inpatient setting (further clarification in H&P documentation)   Admitting Diagnosis: Katarina [[id number]]   Admitting Physician: Tawanna Cuadra [[id number]]   Attending Physician: Tawanna Cuadra [[id number]]   Estimated Length of Stay: 2 Midnights   Discharge Plan[de-identified] Home with Office Follow-up

## 2022-01-10 ENCOUNTER — APPOINTMENT (OUTPATIENT)
Dept: VASCULAR SURGERY | Age: 52
DRG: 177 | End: 2022-01-10
Attending: INTERNAL MEDICINE
Payer: MEDICARE

## 2022-01-10 ENCOUNTER — APPOINTMENT (OUTPATIENT)
Dept: NON INVASIVE DIAGNOSTICS | Age: 52
DRG: 177 | End: 2022-01-10
Attending: INTERNAL MEDICINE
Payer: MEDICARE

## 2022-01-10 PROBLEM — U07.1 PNEUMONIA DUE TO COVID-19 VIRUS: Status: ACTIVE | Noted: 2022-01-10

## 2022-01-10 PROBLEM — J12.82 PNEUMONIA DUE TO COVID-19 VIRUS: Status: ACTIVE | Noted: 2022-01-10

## 2022-01-10 PROBLEM — U07.1 COVID: Status: ACTIVE | Noted: 2022-01-10

## 2022-01-10 LAB
APTT PPP: 20.9 SEC (ref 23–36.4)
ATRIAL RATE: 87 BPM
CALCULATED P AXIS, ECG09: 35 DEGREES
CALCULATED R AXIS, ECG10: -55 DEGREES
CALCULATED T AXIS, ECG11: 44 DEGREES
CRP SERPL-MCNC: 12.9 MG/DL (ref 0–0.3)
CRP SERPL-MCNC: 13.5 MG/DL (ref 0–0.3)
DIAGNOSIS, 93000: NORMAL
ECHO LV FRACTIONAL SHORTENING: 29 % (ref 28–44)
ECHO LV INTERNAL DIMENSION DIASTOLE INDEX: 2.11 CM/M2
ECHO LV INTERNAL DIMENSION DIASTOLIC: 4.8 CM (ref 4.2–5.9)
ECHO LV INTERNAL DIMENSION SYSTOLIC INDEX: 1.49 CM/M2
ECHO LV INTERNAL DIMENSION SYSTOLIC: 3.4 CM
ECHO LV IVSD: 1.2 CM (ref 0.6–1)
ECHO LV MASS 2D: 206.4 G (ref 88–224)
ECHO LV MASS INDEX 2D: 90.5 G/M2 (ref 49–115)
ECHO LV POSTERIOR WALL DIASTOLIC: 1.1 CM (ref 0.6–1)
ECHO LV RELATIVE WALL THICKNESS RATIO: 0.46
EST. AVERAGE GLUCOSE BLD GHB EST-MCNC: 177 MG/DL
GLUCOSE BLD STRIP.AUTO-MCNC: 408 MG/DL (ref 70–110)
GLUCOSE BLD STRIP.AUTO-MCNC: 424 MG/DL (ref 70–110)
GLUCOSE BLD STRIP.AUTO-MCNC: 434 MG/DL (ref 70–110)
HBA1C MFR BLD: 7.8 % (ref 4.2–5.6)
P-R INTERVAL, ECG05: 184 MS
Q-T INTERVAL, ECG07: 358 MS
QRS DURATION, ECG06: 102 MS
QTC CALCULATION (BEZET), ECG08: 430 MS
TROPONIN-HIGH SENSITIVITY: 93 NG/L (ref 0–78)
VENTRICULAR RATE, ECG03: 87 BPM

## 2022-01-10 PROCEDURE — C8923 2D TTE W OR W/O FOL W/CON,CO: HCPCS

## 2022-01-10 PROCEDURE — 74011250636 HC RX REV CODE- 250/636: Performed by: INTERNAL MEDICINE

## 2022-01-10 PROCEDURE — 82962 GLUCOSE BLOOD TEST: CPT

## 2022-01-10 PROCEDURE — 94640 AIRWAY INHALATION TREATMENT: CPT

## 2022-01-10 PROCEDURE — 85730 THROMBOPLASTIN TIME PARTIAL: CPT

## 2022-01-10 PROCEDURE — 74011636637 HC RX REV CODE- 636/637: Performed by: INTERNAL MEDICINE

## 2022-01-10 PROCEDURE — 84484 ASSAY OF TROPONIN QUANT: CPT

## 2022-01-10 PROCEDURE — 74011250636 HC RX REV CODE- 250/636: Performed by: STUDENT IN AN ORGANIZED HEALTH CARE EDUCATION/TRAINING PROGRAM

## 2022-01-10 PROCEDURE — 65270000029 HC RM PRIVATE

## 2022-01-10 PROCEDURE — XW0DXM6 INTRODUCTION OF BARICITINIB INTO MOUTH AND PHARYNX, EXTERNAL APPROACH, NEW TECHNOLOGY GROUP 6: ICD-10-PCS | Performed by: INTERNAL MEDICINE

## 2022-01-10 PROCEDURE — 74011250636 HC RX REV CODE- 250/636: Performed by: HOSPITALIST

## 2022-01-10 PROCEDURE — 83036 HEMOGLOBIN GLYCOSYLATED A1C: CPT

## 2022-01-10 PROCEDURE — 65660000004 HC RM CVT STEPDOWN

## 2022-01-10 PROCEDURE — 99223 1ST HOSP IP/OBS HIGH 75: CPT | Performed by: HOSPITALIST

## 2022-01-10 PROCEDURE — 74011250637 HC RX REV CODE- 250/637: Performed by: INTERNAL MEDICINE

## 2022-01-10 PROCEDURE — 74011000250 HC RX REV CODE- 250: Performed by: HOSPITALIST

## 2022-01-10 PROCEDURE — 93970 EXTREMITY STUDY: CPT

## 2022-01-10 PROCEDURE — 86140 C-REACTIVE PROTEIN: CPT

## 2022-01-10 RX ORDER — ACETAMINOPHEN 325 MG/1
650 TABLET ORAL
Status: DISCONTINUED | OUTPATIENT
Start: 2022-01-10 | End: 2022-02-04 | Stop reason: HOSPADM

## 2022-01-10 RX ORDER — HEPARIN SODIUM 1000 [USP'U]/ML
80 INJECTION, SOLUTION INTRAVENOUS; SUBCUTANEOUS ONCE
Status: COMPLETED | OUTPATIENT
Start: 2022-01-10 | End: 2022-01-10

## 2022-01-10 RX ORDER — ENOXAPARIN SODIUM 100 MG/ML
40 INJECTION SUBCUTANEOUS DAILY
Status: DISCONTINUED | OUTPATIENT
Start: 2022-01-10 | End: 2022-01-10

## 2022-01-10 RX ORDER — INSULIN GLARGINE 100 [IU]/ML
15 INJECTION, SOLUTION SUBCUTANEOUS DAILY
Status: DISCONTINUED | OUTPATIENT
Start: 2022-01-10 | End: 2022-01-11

## 2022-01-10 RX ORDER — SODIUM CHLORIDE 0.9 % (FLUSH) 0.9 %
5-40 SYRINGE (ML) INJECTION EVERY 8 HOURS
Status: DISCONTINUED | OUTPATIENT
Start: 2022-01-10 | End: 2022-02-04 | Stop reason: HOSPADM

## 2022-01-10 RX ORDER — ONDANSETRON 4 MG/1
4 TABLET, ORALLY DISINTEGRATING ORAL
Status: DISCONTINUED | OUTPATIENT
Start: 2022-01-10 | End: 2022-02-04 | Stop reason: HOSPADM

## 2022-01-10 RX ORDER — SODIUM CHLORIDE 9 MG/ML
75 INJECTION, SOLUTION INTRAVENOUS CONTINUOUS
Status: DISCONTINUED | OUTPATIENT
Start: 2022-01-10 | End: 2022-01-11

## 2022-01-10 RX ORDER — FAMOTIDINE 20 MG/1
20 TABLET, FILM COATED ORAL DAILY
Status: DISCONTINUED | OUTPATIENT
Start: 2022-01-10 | End: 2022-02-04 | Stop reason: HOSPADM

## 2022-01-10 RX ORDER — ONDANSETRON 2 MG/ML
4 INJECTION INTRAMUSCULAR; INTRAVENOUS
Status: DISCONTINUED | OUTPATIENT
Start: 2022-01-10 | End: 2022-02-04 | Stop reason: HOSPADM

## 2022-01-10 RX ORDER — SODIUM CHLORIDE 0.9 % (FLUSH) 0.9 %
5-40 SYRINGE (ML) INJECTION AS NEEDED
Status: DISCONTINUED | OUTPATIENT
Start: 2022-01-10 | End: 2022-02-04 | Stop reason: HOSPADM

## 2022-01-10 RX ORDER — DEXTROSE 50 % IN WATER (D50W) INTRAVENOUS SYRINGE
25-50 AS NEEDED
Status: DISCONTINUED | OUTPATIENT
Start: 2022-01-10 | End: 2022-01-19

## 2022-01-10 RX ORDER — MAGNESIUM SULFATE 100 %
4 CRYSTALS MISCELLANEOUS AS NEEDED
Status: DISCONTINUED | OUTPATIENT
Start: 2022-01-10 | End: 2022-02-04 | Stop reason: HOSPADM

## 2022-01-10 RX ORDER — DEXAMETHASONE SODIUM PHOSPHATE 4 MG/ML
10 INJECTION, SOLUTION INTRA-ARTICULAR; INTRALESIONAL; INTRAMUSCULAR; INTRAVENOUS; SOFT TISSUE EVERY 12 HOURS
Status: DISPENSED | OUTPATIENT
Start: 2022-01-10 | End: 2022-01-14

## 2022-01-10 RX ORDER — INSULIN LISPRO 100 [IU]/ML
INJECTION, SOLUTION INTRAVENOUS; SUBCUTANEOUS
Status: DISCONTINUED | OUTPATIENT
Start: 2022-01-10 | End: 2022-02-04 | Stop reason: HOSPADM

## 2022-01-10 RX ORDER — CHOLECALCIFEROL (VITAMIN D3) 125 MCG
5 CAPSULE ORAL
Status: DISCONTINUED | OUTPATIENT
Start: 2022-01-10 | End: 2022-01-31

## 2022-01-10 RX ORDER — POLYETHYLENE GLYCOL 3350 17 G/17G
17 POWDER, FOR SOLUTION ORAL DAILY PRN
Status: DISCONTINUED | OUTPATIENT
Start: 2022-01-10 | End: 2022-02-04 | Stop reason: HOSPADM

## 2022-01-10 RX ORDER — ACETAMINOPHEN 650 MG/1
650 SUPPOSITORY RECTAL
Status: DISCONTINUED | OUTPATIENT
Start: 2022-01-10 | End: 2022-02-04 | Stop reason: HOSPADM

## 2022-01-10 RX ORDER — CHOLECALCIFEROL (VITAMIN D3) 125 MCG
5000 CAPSULE ORAL DAILY
Status: DISCONTINUED | OUTPATIENT
Start: 2022-01-11 | End: 2022-02-04 | Stop reason: HOSPADM

## 2022-01-10 RX ORDER — DEXAMETHASONE SODIUM PHOSPHATE 4 MG/ML
6 INJECTION, SOLUTION INTRA-ARTICULAR; INTRALESIONAL; INTRAMUSCULAR; INTRAVENOUS; SOFT TISSUE EVERY 12 HOURS
Status: DISCONTINUED | OUTPATIENT
Start: 2022-01-10 | End: 2022-01-10

## 2022-01-10 RX ORDER — IPRATROPIUM BROMIDE AND ALBUTEROL SULFATE 2.5; .5 MG/3ML; MG/3ML
3 SOLUTION RESPIRATORY (INHALATION)
Status: DISCONTINUED | OUTPATIENT
Start: 2022-01-10 | End: 2022-01-19

## 2022-01-10 RX ADMIN — HEPARIN SODIUM 8710 UNITS: 1000 INJECTION, SOLUTION INTRAVENOUS; SUBCUTANEOUS at 17:30

## 2022-01-10 RX ADMIN — IPRATROPIUM BROMIDE AND ALBUTEROL SULFATE 3 ML: .5; 3 SOLUTION RESPIRATORY (INHALATION) at 12:00

## 2022-01-10 RX ADMIN — PERFLUTREN 2 ML: 6.52 INJECTION, SUSPENSION INTRAVENOUS at 15:36

## 2022-01-10 RX ADMIN — FAMOTIDINE 20 MG: 20 TABLET ORAL at 11:38

## 2022-01-10 RX ADMIN — Medication 15 UNITS: at 11:30

## 2022-01-10 RX ADMIN — DEXAMETHASONE SODIUM PHOSPHATE 10 MG: 4 INJECTION, SOLUTION INTRA-ARTICULAR; INTRALESIONAL; INTRAMUSCULAR; INTRAVENOUS; SOFT TISSUE at 00:41

## 2022-01-10 RX ADMIN — IPRATROPIUM BROMIDE AND ALBUTEROL SULFATE 3 ML: .5; 3 SOLUTION RESPIRATORY (INHALATION) at 06:22

## 2022-01-10 RX ADMIN — HEPARIN SODIUM 18 UNITS/KG/HR: 1000 INJECTION INTRAVENOUS; SUBCUTANEOUS at 17:33

## 2022-01-10 RX ADMIN — Medication 15 UNITS: at 18:40

## 2022-01-10 RX ADMIN — SODIUM CHLORIDE, PRESERVATIVE FREE 10 ML: 5 INJECTION INTRAVENOUS at 00:57

## 2022-01-10 RX ADMIN — ENOXAPARIN SODIUM 40 MG: 100 INJECTION SUBCUTANEOUS at 09:08

## 2022-01-10 RX ADMIN — BARICITINIB 2 MG: 2 TABLET, FILM COATED ORAL at 19:28

## 2022-01-10 RX ADMIN — Medication 15 UNITS: at 14:50

## 2022-01-10 RX ADMIN — IPRATROPIUM BROMIDE AND ALBUTEROL SULFATE 3 ML: .5; 3 SOLUTION RESPIRATORY (INHALATION) at 01:37

## 2022-01-10 RX ADMIN — AZITHROMYCIN DIHYDRATE 500 MG: 500 INJECTION, POWDER, LYOPHILIZED, FOR SOLUTION INTRAVENOUS at 01:37

## 2022-01-10 RX ADMIN — SODIUM CHLORIDE, PRESERVATIVE FREE 10 ML: 5 INJECTION INTRAVENOUS at 14:00

## 2022-01-10 RX ADMIN — CEFTRIAXONE 1 G: 1 INJECTION, POWDER, FOR SOLUTION INTRAMUSCULAR; INTRAVENOUS at 01:20

## 2022-01-10 RX ADMIN — SODIUM CHLORIDE, PRESERVATIVE FREE 10 ML: 5 INJECTION INTRAVENOUS at 06:00

## 2022-01-10 RX ADMIN — IPRATROPIUM BROMIDE AND ALBUTEROL SULFATE 3 ML: .5; 3 SOLUTION RESPIRATORY (INHALATION) at 15:52

## 2022-01-10 RX ADMIN — IPRATROPIUM BROMIDE AND ALBUTEROL SULFATE 3 ML: .5; 3 SOLUTION RESPIRATORY (INHALATION) at 09:08

## 2022-01-10 NOTE — DIABETES MGMT
Diabetes/ Glycemic Control Plan of Care    Recommendations: correctional lispro insulin for patient with history of diabetes and currently on steroids. Order obtained. 01/10: Patient in ED and noted that he was admitted on 01/09/2022 with report of shortness of breath, weakness and recent exposure to COVID-19. Assessment:   DX:   1. COVID        Fasting/ Morning blood glucose:   Lab Results   Component Value Date/Time    Glucose 312 (H) 01/09/2022 08:34 PM     IV Fluids containing dextrose:  None    Steroids:   Rx Glucocorticoids (24h ago, onward)             Start     Dose Route Frequency Ordered Stop    01/10/22 0057  dexamethasone (DECADRON) 4 mg/mL injection 6 mg         6 mg IV EVERY 12 HOURS 01/10/22 0056 --               Rx Glucocorticoids (24h ago, onward)             Start     Dose Route Frequency Ordered Stop    01/10/22 0057  dexamethasone (DECADRON) 4 mg/mL injection 6 mg         6 mg IV EVERY 12 HOURS 01/10/22 0056 --                 Blood glucose values:     Lab BG 01/09/2022: 312  Pending POC BG at time of this review    Within target range (70-180mg/dL): No    Current insulin orders:   Correctional lispro insulin. Normal sensitivity dose    Total Daily Dose previous 24 hours: None    Current A1c:   Lab Results   Component Value Date/Time    Hemoglobin A1c 7.8 (H) 01/10/2022 05:10 AM      equivalent  to ave Blood Glucose of 177 mg/dl for 2-3 months prior to admission. Adequate glycemic control PTA: No    Nutrition/Diet:   Active Orders   Diet    ADULT DIET Regular; 4 carb choices (60 gm/meal); Low Fat/Low Chol/High Fiber/2 gm Na      Meal Intake:  No data found. Supplement Intake:  No data found. Home diabetes medications:   Key Antihyperglycemic Medications             SITagliptin-metFORMIN (JANUMET) 50-1,000 mg per tablet Take 1 Tab by mouth two (2) times daily (with meals).           Plan/Goals:   Blood glucose will be within target of 70 - 180 mg/dl within 72 hours       Education: [] Refer to Diabetes Education Record                       [x] Education not indicated at this time     Mildred Rizzo RN  Pager: [phone]

## 2022-01-10 NOTE — ED NOTES
Ultrasound reported that pt has vascular occlusions bilaterally. Spoke to Dr. Rylee Mcclure, no new orders, he will be down to see pt.

## 2022-01-10 NOTE — CONSULTS
Infectious Disease Consultation Note        Reason: Severe COVID-19 pneumonia    Current abx Prior abx   Ceftriaxone, azithromycin since 1/10/2022      Lines:       Assessment :  46 y.o. male who has a history of hypertension, type 2 diabetes presented to the emergency room on 1/9/22 with shortness of breath. Clinical presentation consistent with acute hypoxic respiratory failure-present on admission due to confirmed COVID-19 pneumonia, severe    Mild elevated procalcitonin-could be due to acute kidney injury. Unable to exclude superimposed bacterial pneumonia with full certainty    Acute kidney injury-likely secondary to volume depletion. Monitor for COVID-19 associated nephropathy/ATN    Elevated [id number] be secondary to severe COVID-19 infection. Rule out COVID-19 associated hypercoagulability      Recommendations:    1. Continue Decadron. Increase dose to 10 mg IV every 12 hours. Add baricitinib   2. Will hold off on remdesivir since doubt much benefit at this time (data from randomized trials do not demonstrate a clear, major clinical benefit with remdesivir among hospitalized patients with high oxygen requirement)  3. Continue ceftriaxone, azithromycin for now  4. Obtain venous duplex bilateral upper extremity/lower extremity to rule out dvt  5. Monitor CRP, D-dimer, procalcitonin, oxygenation      Thank you for consultation request. Above plan was discussed in details with patient,RN and dr Rojelio Galvan. Please call me if any further questions or concerns. Will continue to participate in the care of this patient. HPI:    46 y.o. male who has a history of hypertension, type 2 diabetes presented to the emergency room on 1/9/22 with shortness of breath. Patient mentions that he was recently exposed to a COVID-positive caregiver 3 days ago.   He has not been vaccinated against COVID-19.     ER evaluation-patient noted to be hypoxic on arrival, he was placed on nonrebreather mask with improvement in O2 sats.  Labs also showed MONICA with elevated procalcitonin and elevated troponin. Patient has been started on IV antibiotics, IV steroids. Patient was admitted to [facility] for further management. I was consulted for further recommendations. Patient is a poor historian. States that he came to the hospital because his wife told him to do so. Is unable to state as to how long he is having shortness of breath. Denies any known exposure to anyone with COVID-19. Denies any chest pain, abdominal pain, diarrhea/constipation. Was on 15 L nonrebreather mask when I evaluated him. Quick desaturation to seventies when mask taken off    Past Medical History:   Diagnosis Date    CVA [facility]cerebral vascular accident) [facility] [address]     right-sided deficit    Diabetes ([facility] [address]     HTN (hypertension)        Past Surgical History:   Procedure Laterality Date    HX HIP FRACTURE TX      left    HX ORTHOPAEDIC      hip       Patient's Medications   Start Taking    No medications on file   Continue Taking    AMLODIPINE-OLMESARTAN 10-40 MG TAB    Take  by mouth. HYDROCODONE-ACETAMINOPHEN (NORCO) 5-325 MG PER TABLET    Take 1 Tab by mouth every four (4) hours as needed for Pain. METOPROLOL (LOPRESSOR) 50 MG TABLET    Take 1 Tab by mouth two (2) times a day. SITAGLIPTIN-METFORMIN (JANUMET) 50-1,000 MG PER TABLET    Take 1 Tab by mouth two (2) times daily (with meals).    These Medications have changed    No medications on file   Stop Taking    No medications on file       Current Facility-Administered Medications   Medication Dose Route Frequency    sodium chloride (NS) flush 5-40 mL  5-40 mL IntraVENous Q8H    sodium chloride (NS) flush 5-40 mL  5-40 mL IntraVENous PRN    acetaminophen (TYLENOL) tablet 650 mg  650 mg Oral Q6H PRN    Or    acetaminophen (TYLENOL) suppository 650 mg  650 mg Rectal Q6H PRN    polyethylene glycol (MIRALAX) packet 17 g  17 g Oral DAILY PRN    ondansetron (ZOFRAN ODT) tablet 4 mg  4 mg Oral Q8H PRN    Or  ondansetron (ZOFRAN) injection 4 mg  4 mg IntraVENous Q6H PRN    enoxaparin (LOVENOX) injection 40 mg  40 mg SubCUTAneous DAILY    dexamethasone (DECADRON) 4 mg/mL injection 6 mg  6 mg IntraVENous Q12H    albuterol-ipratropium (DUO-NEB) 2.5 MG-0.5 MG/3 ML  3 mL Nebulization Q4H RT    cefTRIAXone (ROCEPHIN) 1 g in sterile water (preservative free) 10 mL IV syringe  1 g IntraVENous Q24H    azithromycin (ZITHROMAX) 500 mg in 0.9% sodium chloride 250 mL (VIAL-MATE)  500 mg IntraVENous Q24H    0.9% sodium chloride infusion  75 mL/hr IntraVENous CONTINUOUS     Current Outpatient Medications   Medication Sig    amLODIPine-Olmesartan 10-40 mg tab Take  by mouth.  SITagliptin-metFORMIN (JANUMET) 50-1,000 mg per tablet Take 1 Tab by mouth two (2) times daily (with meals).  metoprolol (LOPRESSOR) 50 mg tablet Take 1 Tab by mouth two (2) times a day.  HYDROcodone-acetaminophen (NORCO) 5-325 mg per tablet Take 1 Tab by mouth every four (4) hours as needed for Pain.        Allergies: Grape    Family History   Problem Relation Age of Onset    Stroke Neg Hx     Hypertension Neg Hx     Heart Disease Neg Hx     Diabetes Neg Hx     Cancer Neg Hx      Social History     Socioeconomic History    Marital status:      Spouse name: Not on file    Number of children: Not on file    Years of education: Not on file    Highest education level: Not on file   Occupational History    Not on file   Tobacco Use    Smoking status: Never Smoker    Smokeless tobacco: Never Used   Substance and Sexual Activity    Alcohol use: No    Drug use: No    Sexual activity: Not on file   Other Topics Concern    Not on file   Social History Narrative    Not on file     Social Determinants of Health     Financial Resource Strain:     Difficulty of Paying Living Expenses: Not on file   Food Insecurity:     Worried About Running Out of Food in the Last Year: Not on file    Michael of Food in the Last Year: Not on file Transportation Needs:     Lack of Transportation (Medical): Not on file    Lack of Transportation (Non-Medical): Not on file   Physical Activity:     Days of Exercise per Week: Not on file    Minutes of Exercise per Session: Not on file   Stress:     Feeling of Stress : Not on file   Social Connections:     Frequency of Communication with Friends and Family: Not on file    Frequency of Social Gatherings with Friends and Family: Not on file    Attends Church Services: Not on file    Active Member of [address] [place] [address] [employer] or Organizations: Not on file    Attends Club or Organization Meetings: Not on file    Marital Status: Not on file   Intimate Partner Violence:     Fear of Current or Ex-Partner: Not on file    Emotionally Abused: Not on file    Physically Abused: Not on file    Sexually Abused: Not on file   Housing Stability:     Unable to Pay for Housing in the Last Year: Not on file    Number of [place] in the Last Year: Not on file    Unstable Housing in the Last Year: Not on file     Social History     Tobacco Use   Smoking Status Never Smoker   Smokeless Tobacco Never Used        Temp (24hrs), Av.9 °F (37.2 °C), Min:98.5 °F (36.9 °C), Max:99.3 °F (37.4 °C)    Visit Vitals  BP 98/67   Pulse 85   Temp 98.5 °F (36.9 °C)   Resp 25   Ht 5' 11\" (1.803 m)   Wt 108.9 kg (240 lb)   SpO2 90%   BMI 33.47 kg/m²       ROS: 12 point ROS obtained in details. Pertinent positives as mentioned in HPI,   otherwise negative    Physical Exam:    Vitals signs and nursing note reviewed. Constitutional:       Sitting on bed, nonrebreather mask on face, in no apparent distress  HENT:      Head: Normocephalic. Eyes:      Conjunctiva/sclera: Conjunctivae normal.      Neck:      Musculoskeletal: Normal range of motion and neck supple.    Cardiovascular:      Rate and Rhythm: Normal rate and regular rhythm on monitor  Chest:      Bilateral chest movements equal.  Auscultation deferred due to Covid positive  Abdominal: General: There is no distension. Palpations: Abdomen is soft. Tenderness: There is no abdominal tenderness. There is no rebound. Musculoskeletal: Normal range of motion. General: No tenderness. Skin:     General: Skin is warm and dry. Findings: No rash. Neurological:      Mental Status: He is alert and oriented to person, place, and time. Cranial Nerves: No cranial nerve deficit. Motor: No abnormal muscle tone. Coordination: Coordination normal.   Psychiatric:         Behavior: Behavior normal.         Thought Content: Thought content normal.         Judgment: Judgment normal.   Labs: Results:   Chemistry Recent Labs     01/09/22 2034   *      K 5.0      CO2 24   BUN 43*   CREA 2.49*   CA 8.2*   AGAP 10   BUCR 17   AP 41*   TP 7.9   ALB 2.8*   GLOB 5.1*   AGRAT 0.5*      CBC w/Diff Recent Labs     01/09/22 2034   WBC 6.0   RBC 4.94   HGB 13.7   HCT 42.0   *   GRANS 83*   LYMPH 11*   EOS 0      Microbiology No results for input(s): CULT in the last 72 hours. RADIOLOGY:    All available imaging studies/reports in [facility] for this admission were reviewed      Disclaimer: Sections of this note are dictated utilizing voice recognition software, which may have resulted in some phonetic based errors in grammar and contents. Even though attempts were made to correct all the mistakes, some may have been missed, and remained in the body of the document. If questions arise, please contact our department.     Dr. Vikram Packer, Infectious Disease Specialist  [phone]  January 10, 2022  9:00 AM

## 2022-01-10 NOTE — ED TRIAGE NOTES
Pt to ED via EMS with SOB, weakness, and low O2 Sat that started 2-3 days ago. Pt with recent exposure to COVID 19 caregiver. Pt with history of CVA with right sided weakness, HTN, DM. EMS reports SPO2 in 80s on RA, Pt placed on Non-rebreather in route.
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
## 2022-01-10 NOTE — ED PROVIDER NOTES
HPI   Patient is a 40-year-old male who is brought in by EMS for shortness of breath and weakness. This patient is altered and to me he states that he has no complaints has no idea why he is here. Per EMS they state that patient was recently exposed to a COVID-positive caregiver and over the last approximately 3 days has been having progressively worsening shortness of breath and weakness. When EMS arrived he was saturating in the 80s and was started on a nonrebreather which did improve his oxygen saturation. Past Medical History:   Diagnosis Date    CVA (cerebral vascular accident) ([facility] [address]     right-sided deficit    Diabetes ([facility] [address]     HTN (hypertension)        Past Surgical History:   Procedure Laterality Date    HX HIP FRACTURE TX      left    HX ORTHOPAEDIC      hip         Family History:   Problem Relation Age of Onset    Stroke Neg Hx     Hypertension Neg Hx     Heart Disease Neg Hx     Diabetes Neg Hx     Cancer Neg Hx        Social History     Socioeconomic History    Marital status:      Spouse name: Not on file    Number of children: Not on file    Years of education: Not on file    Highest education level: Not on file   Occupational History    Not on file   Tobacco Use    Smoking status: Never Smoker    Smokeless tobacco: Never Used   Substance and Sexual Activity    Alcohol use: No    Drug use: No    Sexual activity: Not on file   Other Topics Concern    Not on file   Social History Narrative    Not on file     Social Determinants of Health     Financial Resource Strain:     Difficulty of Paying Living Expenses: Not on file   Food Insecurity:     Worried About Running Out of Food in the Last Year: Not on file    Michael of Food in the Last Year: Not on file   Transportation Needs:     Lack of Transportation (Medical): Not on file    Lack of Transportation (Non-Medical):  Not on file   Physical Activity:     Days of Exercise per Week: Not on file    Minutes of Exercise per Session: Not on file   Stress:     Feeling of Stress : Not on file   Social Connections:     Frequency of Communication with Friends and Family: Not on file    Frequency of Social Gatherings with Friends and Family: Not on file    Attends Faith Services: Not on file    Active Member of Clubs or Organizations: Not on file    Attends Club or Organization Meetings: Not on file    Marital Status: Not on file   Intimate Partner Violence:     Fear of Current or Ex-Partner: Not on file    Emotionally Abused: Not on file    Physically Abused: Not on file    Sexually Abused: Not on file   Housing Stability:     Unable to Pay for Housing in the Last Year: Not on file    Number of [place] in the Last Year: Not on file    Unstable Housing in the Last Year: Not on file         ALLERGIES: Grape    Review of Systems  Unable to obtain due to altered mental status  Vitals:    01/09/22 2027   BP: 95/69   Pulse: 88   Resp: 29   Temp: 99.3 °F (37.4 °C)   SpO2: 93%   Weight: 108.9 kg (240 lb)   Height: 5' 11\" (1.803 m)            Physical Exam   General: No acute distress  Head: Normocephalic, atraumatic  Psych: Cooperative and alert  Eyes: No scleral icterus, normal conjunctiva  ENT: Moist oral mucosa, no significant rhinorrhea  Neck: Supple  CV: Regular rate and rhythm, no pitting edema, palpable radial pulses  Pulm: Tachypneic and on a nonrebreather but generally clear lung sounds bilaterally  GI: Normal bowel sounds, soft, non-tender  MSK: Moves all four extremities  Skin: No rashes  Neuro: Alert and conversive, alert and oriented to self, states that Yocasta Clements is the president of the 2021 and has no idea where he is at. MDM   Patient is 51-year-old male who presents with shortness of breath status. Patient is most likely COVID-positive due to his recent exposure and hypoxia we will do work-up to evaluate for alternative etiologies.   Patient has waxing and waning altered mentation as per the nurse he was ANO times 3 out of 4, more consistent with a delirium versus encephalopathy. He is no signs of trauma I do not suspect neurologic issue. At this time    EKG shows normal sinus rhythm at a rate of 87 bpm.  There is left axis deviation. R wave progression across pericardium is satisfactory. No specific ST elevation or depression noted. QRS is narrow. Overall shows no signs of ACS or arrhythmia. Chest x-ray shows patchy bilateral opacities consistent with COVID-19 pneumonia. Rapid COVID test is positive. CBC, INR, CMP are within normal limits the exception of an elevated BUN/creatinine consistent with an acute kidney injury. Glucose also mildly elevated at 312 however there is no anion gap or acidosis. AST also double ALT however has been elevated in the past.  D-dimer is elevated at 2.6 however patient also has a positive COVID test therefore less than 3 and no need for CTA at this time. LDH, troponin and BNP are all elevated. Troponin is elevated at 147. This be trended. Patient be started on dexamethasone. Upon reexamination patient is stable on nonrebreather. He continues to saturate in the low 90s but is breathing at a more comfortable rate. He continues to state that he is doing well. Patient is admitted to the hospitalist service. Patient is in agreement with the plan to be admitted at this time.   All orders moving forward replacement the admitting team.    Critical care time: 35 minutes    Procedures

## 2022-01-10 NOTE — PROGRESS NOTES
Hospitalist Progress Note    Patient: Nori Wakefield Age: 46 y.o. : 1970 MR#: [id number] SSN: [id number]  Date/Time: 1/10/2022 4:24 PM    DOA: 2022  PCP: Ricky Fairbanks MD    Subjective:     Admitted this AM by Dr. Rene Johnson  Follow up rounding  He is on 15 L HFNC now. No chest pain. No fever  Troponin decrease  CRP increased. Renal function increased   Duplex with acute DVT LE bilaterally       Interval Hospital Course:        ROS:  No current fever/chills, no headache, no dizziness, no facial pain, no sinus congestion,   No swallowing pain, No chest pain, no palpitation, +shortness of breath, no abd pain,  No diarrhea, no urinary complaint, no leg pain or swelling      Assessment/Plan:   1. Acute respiratory failure with hypoxia due to covid-19 infection   2.  covid-19 pneumonia   3. MONICA on CKD3, associate with covid infection, prerenal ?  4. Elevated troponin, demand ischemia,   5. Hyperglycemia with T2DM, HgbA1c 7.8%  6. Acute DVT on legs, associate hypercoagulable state due to covid   7. H/o stroke     On decadron, baricitinib, ID follows. Droplet precaution  15 L HFNC.  Will need pulmonary consult follow up  Azithromycin, ceftriaxone   Start heparin gtt.   duoneb  Start Lantus 15 units, +ISS  Melatonin  pepcid  ICS  US renal. IV fluid slow for now     Full code       Disposition planning: stepdown   Family updated (.called his wife and updated her on clinical status and care.)  Additional Notes:    Time spent >35  minutes    Case discussed with:  [x]Patient  [x]Family  [x]Nursing  []Case Management  DVT Prophylaxis:  []Lovenox  []Hep SQ  []SCDs  []Coumadin   [x]On Heparin gtt    Signed By: Opal Farias MD     January 10, 2022 4:24 PM              Objective:   VS:   Visit Vitals  /73   Pulse 84   Temp 98.6 °F (37 °C)   Resp 13   Ht 5' 11\" (1.803 m)   Wt 108.9 kg (240 lb)   SpO2 91%   BMI 33.47 kg/m²      Tmax/24hrs: Temp (24hrs), Av.8 °F (37.1 °C), Min:98.5 °F (36.9 °C), Max:99.3 °F (37.4 °C)  No intake or output data in the 24 hours ending 01/10/22 1624    Tele: sinus   General:  Cooperative, Not in acute distress, speaks in short sentence while in bed  HEENT: PERRL, EOMI, supple neck, no JVD, dry oral mucosa  Cardiovascular: S1S2 regular, no rub/gallop   Pulmonary: air entry bilaterally, no wheezing, no crackle  GI:  Soft, non tender, non distended, +bs, no guarding   Extremities:  +pedal edema, +distal pulses appreciated   Neuro: AOx3, moving all extremities, no gross deficit.      Additional:       Current Facility-Administered Medications   Medication Dose Route Frequency    sodium chloride (NS) flush 5-40 mL  5-40 mL IntraVENous Q8H    sodium chloride (NS) flush 5-40 mL  5-40 mL IntraVENous PRN    acetaminophen (TYLENOL) tablet 650 mg  650 mg Oral Q6H PRN    Or    acetaminophen (TYLENOL) suppository 650 mg  650 mg Rectal Q6H PRN    polyethylene glycol (MIRALAX) packet 17 g  17 g Oral DAILY PRN    ondansetron (ZOFRAN ODT) tablet 4 mg  4 mg Oral Q8H PRN    Or    ondansetron (ZOFRAN) injection 4 mg  4 mg IntraVENous Q6H PRN    enoxaparin (LOVENOX) injection 40 mg  40 mg SubCUTAneous DAILY    albuterol-ipratropium (DUO-NEB) 2.5 MG-0.5 MG/3 ML  3 mL Nebulization Q4H RT    azithromycin (ZITHROMAX) 500 mg in 0.9% sodium chloride 250 mL (VIAL-MATE)  500 mg IntraVENous Q24H    0.9% sodium chloride infusion  75 mL/hr IntraVENous CONTINUOUS    insulin lispro (HUMALOG) injection   SubCUTAneous AC&HS    glucose chewable tablet 16 g  4 Tablet Oral PRN    glucagon (GLUCAGEN) injection 1 mg  1 mg IntraMUSCular PRN    dextrose (D50W) injection syrg 12.5-25 g  25-50 mL IntraVENous PRN    famotidine (PEPCID) tablet 20 mg  20 mg Oral DAILY    melatonin tablet 5 mg  5 mg Oral QHS    [START ON 1/11/2022] cholecalciferol (VITAMIN D3) capsule 5,000 Units  5,000 Units Oral DAILY    insulin glargine (LANTUS) injection 15 Units  15 Units SubCUTAneous DAILY    [START ON 1/11/2022] cefTRIAXone (ROCEPHIN) 2 g in sterile water (preservative free) 20 mL IV syringe  2 g IntraVENous Q24H    dexamethasone (DECADRON) 4 mg/mL injection 10 mg  10 mg IntraVENous Q12H    baricitinib (OLUMIANT) tablet 2 mg  2 mg Oral Q24H    heparin 25,000 units in D5W 250 ml infusion  18-36 Units/kg/hr IntraVENous TITRATE    heparin (porcine) 1,000 unit/mL injection 8,710 Units  80 Units/kg IntraVENous ONCE     Current Outpatient Medications   Medication Sig    amLODIPine-Olmesartan 10-40 mg tab Take  by mouth.  SITagliptin-metFORMIN (JANUMET) 50-1,000 mg per tablet Take 1 Tab by mouth two (2) times daily (with meals).  metoprolol (LOPRESSOR) 50 mg tablet Take 1 Tab by mouth two (2) times a day.  HYDROcodone-acetaminophen (NORCO) 5-325 mg per tablet Take 1 Tab by mouth every four (4) hours as needed for Pain.             Lab/Data Review:  Labs: Results:       Chemistry Recent Labs     01/09/22 2034   *      K 5.0      CO2 24   BUN 43*   CREA 2.49*   BUCR 17   AGAP 10   CA 8.2*     Recent Labs     01/09/22 2034   ALT 88*   TP 7.9   ALB 2.8*   GLOB 5.1*   AGRAT 0.5*      CBC w/Diff Recent Labs     01/09/22 2034   WBC 6.0   RBC 4.94   HGB 13.7   HCT 42.0   MCV 85.0   MCH 27.7   MCHC 32.6   RDW 14.0   *   GRANS 83*   LYMPH 11*   EOS 0      Coagulation Recent Labs     01/09/22 2034   PTP 13.5   INR 1.0       Iron/Ferritin Lab Results   Component Value Date/Time    Ferritin 1,426 (H) 01/09/2022 08:34 PM       BNP    Cardiac Enzymes Lab Results   Component Value Date/Time     08/30/2010 12:17 PM        Lactic Acid    Thyroid Studies          All Micro Results     Procedure Component Value Units Date/Time    COVID-19 RAPID TEST [[id number]]  (Abnormal) Collected: 01/09/22 2042    Order Status: Completed Specimen: Nasopharyngeal Updated: 01/09/22 2259     Specimen source Nasopharyngeal        COVID-19 rapid test Detected        Comment:      The specimen is POSITIVE for SARS-CoV-2, the novel coronavirus associated with COVID-19. This test has been authorized by the FDA under an Emergency Use Authorization (EUA) for use by authorized laboratories. Fact sheet for Healthcare Providers: ConventionUpdate.co.nz  Fact sheet for Patients: ConventionUpdate.co.nz       Methodology: Isothermal Nucleic Acid Amplification  CALLED TO AND CORRECTLY REPEATED BY:  SEDA TRINIDAD RN IN ED AT 7399 ON 01/9/22 TO [facility]                 Images:    CT (Most Recent). XRAY (Most Recent) XR Results (most recent):  Results from Hospital Encounter encounter on 01/09/22    XR CHEST PORT    Narrative  EXAM: Chest Radiograph    INDICATION:  sob/hypoxic    TECHNIQUE: AP view of the chest    COMPARISON: 4/8/2007    FINDINGS:  Patchy bilateral opacities greater on the right lung field. No effusions  identified. No pneumothorax identified. There is partial obscuration of the right heart border. The osseous structures are unremarkable. Impression  1. Patchy bilateral opacities greater in the right lung. May represent  infectious process including covid 19 pneumonia. EKG No results found for this or any previous visit.      2D ECHO

## 2022-01-11 ENCOUNTER — APPOINTMENT (OUTPATIENT)
Dept: ULTRASOUND IMAGING | Age: 52
DRG: 177 | End: 2022-01-11
Attending: INTERNAL MEDICINE
Payer: MEDICARE

## 2022-01-11 PROBLEM — N17.9 AKI (ACUTE KIDNEY INJURY) (HCC): Status: ACTIVE | Noted: 2022-01-11

## 2022-01-11 PROBLEM — U07.1 HYPERCOAGULABLE STATE ASSOCIATED WITH COVID-19 (HCC): Status: ACTIVE | Noted: 2022-01-11

## 2022-01-11 PROBLEM — J96.01 ACUTE RESPIRATORY FAILURE WITH HYPOXIA (HCC): Status: ACTIVE | Noted: 2022-01-11

## 2022-01-11 PROBLEM — I82.403 ACUTE DEEP VEIN THROMBOSIS (DVT) OF BOTH LOWER EXTREMITIES (HCC): Status: ACTIVE | Noted: 2022-01-11

## 2022-01-11 PROBLEM — D68.69 HYPERCOAGULABLE STATE ASSOCIATED WITH COVID-19 (HCC): Status: ACTIVE | Noted: 2022-01-11

## 2022-01-11 LAB
ALBUMIN SERPL-MCNC: 2.5 G/DL (ref 3.4–5)
ALBUMIN/GLOB SERPL: 0.5 {RATIO} (ref 0.8–1.7)
ALP SERPL-CCNC: 43 U/L (ref 45–117)
ALT SERPL-CCNC: 82 U/L (ref 16–61)
ANION GAP SERPL CALC-SCNC: 9 MMOL/L (ref 3–18)
APTT PPP: 94.8 SEC (ref 23–36.4)
AST SERPL-CCNC: 60 U/L (ref 10–38)
BASOPHILS # BLD: 0 K/UL (ref 0–0.1)
BASOPHILS NFR BLD: 0 % (ref 0–2)
BILIRUB SERPL-MCNC: 0.9 MG/DL (ref 0.2–1)
BNP SERPL-MCNC: 496 PG/ML (ref 0–900)
BUN SERPL-MCNC: 52 MG/DL (ref 7–18)
BUN/CREAT SERPL: 23 (ref 12–20)
CALCIUM SERPL-MCNC: 8.5 MG/DL (ref 8.5–10.1)
CHLORIDE SERPL-SCNC: 107 MMOL/L (ref 100–111)
CK SERPL-CCNC: 979 U/L (ref 39–308)
CO2 SERPL-SCNC: 26 MMOL/L (ref 21–32)
CREAT SERPL-MCNC: 2.24 MG/DL (ref 0.6–1.3)
CRP SERPL-MCNC: 11.1 MG/DL (ref 0–0.3)
DIFFERENTIAL METHOD BLD: ABNORMAL
EOSINOPHIL # BLD: 0 K/UL (ref 0–0.4)
EOSINOPHIL NFR BLD: 0 % (ref 0–5)
ERYTHROCYTE [DISTWIDTH] IN BLOOD BY AUTOMATED COUNT: 14.2 % (ref 11.6–14.5)
GLOBULIN SER CALC-MCNC: 5.1 G/DL (ref 2–4)
GLUCOSE BLD STRIP.AUTO-MCNC: 313 MG/DL (ref 70–110)
GLUCOSE BLD STRIP.AUTO-MCNC: 344 MG/DL (ref 70–110)
GLUCOSE BLD STRIP.AUTO-MCNC: 364 MG/DL (ref 70–110)
GLUCOSE BLD STRIP.AUTO-MCNC: 370 MG/DL (ref 70–110)
GLUCOSE SERPL-MCNC: 363 MG/DL (ref 74–99)
HCT VFR BLD AUTO: 39.9 % (ref 36–48)
HGB BLD-MCNC: 12.9 G/DL (ref 13–16)
IMM GRANULOCYTES # BLD AUTO: 0.1 K/UL (ref 0–0.04)
IMM GRANULOCYTES NFR BLD AUTO: 1 % (ref 0–0.5)
LYMPHOCYTES # BLD: 0.6 K/UL (ref 0.9–3.6)
LYMPHOCYTES NFR BLD: 6 % (ref 21–52)
MCH RBC QN AUTO: 27.9 PG (ref 24–34)
MCHC RBC AUTO-ENTMCNC: 32.3 G/DL (ref 31–37)
MCV RBC AUTO: 86.2 FL (ref 78–100)
MONOCYTES # BLD: 0.4 K/UL (ref 0.05–1.2)
MONOCYTES NFR BLD: 4 % (ref 3–10)
NEUTS SEG # BLD: 8.2 K/UL (ref 1.8–8)
NEUTS SEG NFR BLD: 88 % (ref 40–73)
NRBC # BLD: 0 K/UL (ref 0–0.01)
NRBC BLD-RTO: 0 PER 100 WBC
PLATELET # BLD AUTO: 135 K/UL (ref 135–420)
PMV BLD AUTO: 13.9 FL (ref 9.2–11.8)
POTASSIUM SERPL-SCNC: 4.4 MMOL/L (ref 3.5–5.5)
PROT SERPL-MCNC: 7.6 G/DL (ref 6.4–8.2)
RBC # BLD AUTO: 4.63 M/UL (ref 4.35–5.65)
SODIUM SERPL-SCNC: 142 MMOL/L (ref 136–145)
WBC # BLD AUTO: 9.3 K/UL (ref 4.6–13.2)

## 2022-01-11 PROCEDURE — 74011250636 HC RX REV CODE- 250/636: Performed by: INTERNAL MEDICINE

## 2022-01-11 PROCEDURE — 74011250636 HC RX REV CODE- 250/636: Performed by: HOSPITALIST

## 2022-01-11 PROCEDURE — 82550 ASSAY OF CK (CPK): CPT

## 2022-01-11 PROCEDURE — 74011000250 HC RX REV CODE- 250: Performed by: INTERNAL MEDICINE

## 2022-01-11 PROCEDURE — 74011636637 HC RX REV CODE- 636/637: Performed by: INTERNAL MEDICINE

## 2022-01-11 PROCEDURE — 80053 COMPREHEN METABOLIC PANEL: CPT

## 2022-01-11 PROCEDURE — 74011000250 HC RX REV CODE- 250: Performed by: HOSPITALIST

## 2022-01-11 PROCEDURE — 99233 SBSQ HOSP IP/OBS HIGH 50: CPT | Performed by: INTERNAL MEDICINE

## 2022-01-11 PROCEDURE — 65660000004 HC RM CVT STEPDOWN

## 2022-01-11 PROCEDURE — 74011250637 HC RX REV CODE- 250/637: Performed by: INTERNAL MEDICINE

## 2022-01-11 PROCEDURE — 82962 GLUCOSE BLOOD TEST: CPT

## 2022-01-11 PROCEDURE — 85025 COMPLETE CBC W/AUTO DIFF WBC: CPT

## 2022-01-11 PROCEDURE — 65270000029 HC RM PRIVATE

## 2022-01-11 PROCEDURE — 94640 AIRWAY INHALATION TREATMENT: CPT

## 2022-01-11 PROCEDURE — 86140 C-REACTIVE PROTEIN: CPT

## 2022-01-11 PROCEDURE — 83880 ASSAY OF NATRIURETIC PEPTIDE: CPT

## 2022-01-11 PROCEDURE — 76770 US EXAM ABDO BACK WALL COMP: CPT

## 2022-01-11 PROCEDURE — 85730 THROMBOPLASTIN TIME PARTIAL: CPT

## 2022-01-11 RX ORDER — INSULIN GLARGINE 100 [IU]/ML
10 INJECTION, SOLUTION SUBCUTANEOUS ONCE
Status: COMPLETED | OUTPATIENT
Start: 2022-01-11 | End: 2022-01-11

## 2022-01-11 RX ORDER — SODIUM CHLORIDE, SODIUM LACTATE, POTASSIUM CHLORIDE, CALCIUM CHLORIDE 600; 310; 30; 20 MG/100ML; MG/100ML; MG/100ML; MG/100ML
75 INJECTION, SOLUTION INTRAVENOUS CONTINUOUS
Status: DISCONTINUED | OUTPATIENT
Start: 2022-01-11 | End: 2022-01-12

## 2022-01-11 RX ORDER — INSULIN GLARGINE 100 [IU]/ML
25 INJECTION, SOLUTION SUBCUTANEOUS DAILY
Status: DISCONTINUED | OUTPATIENT
Start: 2022-01-12 | End: 2022-01-12

## 2022-01-11 RX ADMIN — IPRATROPIUM BROMIDE AND ALBUTEROL SULFATE 3 ML: .5; 3 SOLUTION RESPIRATORY (INHALATION) at 21:00

## 2022-01-11 RX ADMIN — Medication 12 UNITS: at 10:16

## 2022-01-11 RX ADMIN — Medication 5000 UNITS: at 10:17

## 2022-01-11 RX ADMIN — IPRATROPIUM BROMIDE AND ALBUTEROL SULFATE 3 ML: .5; 3 SOLUTION RESPIRATORY (INHALATION) at 18:52

## 2022-01-11 RX ADMIN — SODIUM CHLORIDE 75 ML/HR: 900 INJECTION, SOLUTION INTRAVENOUS at 10:21

## 2022-01-11 RX ADMIN — AZITHROMYCIN DIHYDRATE 500 MG: 500 INJECTION, POWDER, LYOPHILIZED, FOR SOLUTION INTRAVENOUS at 02:44

## 2022-01-11 RX ADMIN — CEFTRIAXONE SODIUM 2 G: 2 INJECTION, POWDER, FOR SOLUTION INTRAMUSCULAR; INTRAVENOUS at 02:37

## 2022-01-11 RX ADMIN — SODIUM CHLORIDE, PRESERVATIVE FREE 10 ML: 5 INJECTION INTRAVENOUS at 17:24

## 2022-01-11 RX ADMIN — IPRATROPIUM BROMIDE AND ALBUTEROL SULFATE 3 ML: .5; 3 SOLUTION RESPIRATORY (INHALATION) at 13:03

## 2022-01-11 RX ADMIN — SODIUM CHLORIDE, SODIUM LACTATE, POTASSIUM CHLORIDE, AND CALCIUM CHLORIDE 75 ML/HR: 600; 310; 30; 20 INJECTION, SOLUTION INTRAVENOUS at 18:53

## 2022-01-11 RX ADMIN — Medication 15 UNITS: at 18:52

## 2022-01-11 RX ADMIN — Medication 15 UNITS: at 10:16

## 2022-01-11 RX ADMIN — SODIUM CHLORIDE, PRESERVATIVE FREE 10 ML: 5 INJECTION INTRAVENOUS at 02:44

## 2022-01-11 RX ADMIN — Medication 15 UNITS: at 15:24

## 2022-01-11 RX ADMIN — IPRATROPIUM BROMIDE AND ALBUTEROL SULFATE 3 ML: .5; 3 SOLUTION RESPIRATORY (INHALATION) at 10:17

## 2022-01-11 RX ADMIN — Medication 10 UNITS: at 15:24

## 2022-01-11 RX ADMIN — HEPARIN SODIUM 18 UNITS/KG/HR: 1000 INJECTION INTRAVENOUS; SUBCUTANEOUS at 10:20

## 2022-01-11 RX ADMIN — DEXAMETHASONE SODIUM PHOSPHATE 10 MG: 4 INJECTION, SOLUTION INTRAMUSCULAR; INTRAVENOUS at 10:17

## 2022-01-11 RX ADMIN — IPRATROPIUM BROMIDE AND ALBUTEROL SULFATE 3 ML: .5; 3 SOLUTION RESPIRATORY (INHALATION) at 02:42

## 2022-01-11 RX ADMIN — FAMOTIDINE 20 MG: 20 TABLET ORAL at 10:17

## 2022-01-11 RX ADMIN — Medication 12 UNITS: at 23:00

## 2022-01-11 NOTE — PROGRESS NOTES
Hospitalist Progress Note    Patient: Bárbara Blum Age: 46 y.o. : 1970 MR#: [id number] SSN: [id number]  Date/Time: 2022 11:23 AM    DOA: 2022  PCP: April Burgos MD    Subjective:       He was seen in ER 3  He was found with O2 saturated at 73% on Room air, apparently, he took off his HFNC   When placed him back on HFNC at 15 L, his O2 saturated at 98%. He has no respiratory distress or complaint  CRP decreasing. Renal function remains stable. He has been on Heparin gtt for acute DVT Lower extremities. Interval Hospital Course:  46 y.o male with HTN, type 2 DM, h/o CVA with right-sided hemiparesis, presented to the ER with worsened shortness of breath. He has came in contact with COVID-19 positive patient. He has no chest pain. No fever. He was not vaccinated. In the ER, he was found to have hypoxia, covid-19 positive, MONICA. ID consulted for further care. He has been on Decadron, Baricitinib, azithromycin, ceftriaxone. Duplex showed acute DVT bilaterally        ROS:  No current fever/chills, no headache, no dizziness, no facial pain, no sinus congestion,   No swallowing pain, No chest pain, no palpitation, +shortness of breath, no abd pain,  No diarrhea, no urinary complaint, no leg pain or swelling    Assessment/Plan:     1. Acute respiratory failure with hypoxia due to COVID-19 infection   2. COVID-19 pneumonia   3. MONICA on CKD3, associate with covid infection, prerenal ?  4. Elevated troponin, demand ischemia,   5. Hyperglycemia with T2DM, HgbA1c 7.8%  6. Acute DVT on legs, associate hypercoagulable state due to covid   7. H/o stroke     On decadron 10mg Q12hrs, baricitinib, ID follows. Droplet precaution  15 L HFNC. Will need pulmonary consult if his oxygen need worsened   Continue Azithromycin, ceftriaxone   Continue  heparin gtt for acute DVT  Continue duoneb  increase Lantus 15 to 25 units, +ISS  Melatonin  pepcid  ICS  US renal. IV fluid slow for now.  Nephrology consulted     Full code     Disposition planning: stepdown   Family updated (.called his wife [phone] and updated her on clinical status and care.)  Additional Notes:    Time spent >35  minutes    Case discussed with:  [x]Patient  [x]Family  [x]Nursing  []Case Management  DVT Prophylaxis:  []Lovenox  []Hep SQ  []SCDs  []Coumadin   [x]On Heparin gtt    Signed By: Chris Cox MD     2022 11:23 AM              Objective:   VS:   Visit Vitals  /80   Pulse 97   Temp 98.3 °F (36.8 °C)   Resp (!) 35   Ht 5' 11\" (1.803 m)   Wt 108.9 kg (240 lb)   SpO2 97%   BMI 33.47 kg/m²      Tmax/24hrs: Temp (24hrs), Av.3 °F (36.8 °C), Min:98.3 °F (36.8 °C), Max:98.3 °F (36.8 °C)      Intake/Output Summary (Last 24 hours) at 2022 1124  Last data filed at 2022 0344  Gross per 24 hour   Intake 500 ml   Output 1800 ml   Net -1300 ml       Tele: sinus   General:  Cooperative, Not in acute distress, speaks in short sentence while in bed  HEENT: PERRL, EOMI, supple neck, no JVD, dry oral mucosa  Cardiovascular: S1S2 regular, no rub/gallop   Pulmonary: air entry bilaterally, no wheezing, no crackle  GI:  Soft, non tender, non distended, +bs, no guarding   Extremities:  +pedal edema, +distal pulses appreciated   Neuro: AOx3, moving all extremities, no gross deficit.      Additional:       Current Facility-Administered Medications   Medication Dose Route Frequency    sodium chloride (NS) flush 5-40 mL  5-40 mL IntraVENous Q8H    sodium chloride (NS) flush 5-40 mL  5-40 mL IntraVENous PRN    acetaminophen (TYLENOL) tablet 650 mg  650 mg Oral Q6H PRN    Or    acetaminophen (TYLENOL) suppository 650 mg  650 mg Rectal Q6H PRN    polyethylene glycol (MIRALAX) packet 17 g  17 g Oral DAILY PRN    ondansetron (ZOFRAN ODT) tablet 4 mg  4 mg Oral Q8H PRN    Or    ondansetron (ZOFRAN) injection 4 mg  4 mg IntraVENous Q6H PRN    albuterol-ipratropium (DUO-NEB) 2.5 MG-0.5 MG/3 ML  3 mL Nebulization Q4H RT    azithromycin (ZITHROMAX) 500 mg in 0.9% sodium chloride 250 mL (VIAL-MATE)  500 mg IntraVENous Q24H    0.9% sodium chloride infusion  75 mL/hr IntraVENous CONTINUOUS    insulin lispro (HUMALOG) injection   SubCUTAneous AC&HS    glucose chewable tablet 16 g  4 Tablet Oral PRN    glucagon (GLUCAGEN) injection 1 mg  1 mg IntraMUSCular PRN    dextrose (D50W) injection syrg 12.5-25 g  25-50 mL IntraVENous PRN    famotidine (PEPCID) tablet 20 mg  20 mg Oral DAILY    melatonin tablet 5 mg  5 mg Oral QHS    cholecalciferol (VITAMIN D3) capsule 5,000 Units  5,000 Units Oral DAILY    insulin glargine (LANTUS) injection 15 Units  15 Units SubCUTAneous DAILY    cefTRIAXone (ROCEPHIN) 2 g in sterile water (preservative free) 20 mL IV syringe  2 g IntraVENous Q24H    dexamethasone (DECADRON) 4 mg/mL injection 10 mg  10 mg IntraVENous Q12H    baricitinib (OLUMIANT) tablet 2 mg  2 mg Oral Q24H    heparin 25,000 units in  ml infusion  18-36 Units/kg/hr IntraVENous TITRATE     Current Outpatient Medications   Medication Sig    amLODIPine-Olmesartan 10-40 mg tab Take  by mouth.  SITagliptin-metFORMIN (JANUMET) 50-1,000 mg per tablet Take 1 Tab by mouth two (2) times daily (with meals).  metoprolol (LOPRESSOR) 50 mg tablet Take 1 Tab by mouth two (2) times a day.  HYDROcodone-acetaminophen (NORCO) 5-325 mg per tablet Take 1 Tab by mouth every four (4) hours as needed for Pain.             Lab/Data Review:  Labs: Results:       Chemistry Recent Labs     01/11/22 0456 01/09/22 2034   * 312*    138   K 4.4 5.0    104   CO2 26 24   BUN 52* 43*   CREA 2.24* 2.49*   BUCR 23* 17   AGAP 9 10   CA 8.5 8.2*     Recent Labs     01/11/22 0456 01/09/22 2034   ALT 82* 88*   TP 7.6 7.9   ALB 2.5* 2.8*   GLOB 5.1* 5.1*   AGRAT 0.5* 0.5*      CBC w/Diff Recent Labs     01/11/22 0456 01/09/22 2034 01/09/22  2034   WBC 9.3  --  6.0   RBC 4.63  --  4.94   HGB 12.9*  --  13.7   HCT 39.9  --  42.0   MCV 86.2   < > 85.0   MCH 27.9   < > 27.7   MCHC 32.3   < > 32.6   RDW 14.2   < > 14.0     --  105*   GRANS 88*  --  83*   LYMPH 6*  --  11*   EOS 0  --  0    < > = values in this interval not displayed. Coagulation Recent Labs     01/11/22  0235 01/10/22  1718 01/09/22 2034   PTP  --   --  13.5   INR  --   --  1.0   APTT 94.8* 20.9*  --        Iron/Ferritin Lab Results   Component Value Date/Time    Ferritin 1,426 (H) 01/09/2022 08:34 PM       BNP    Cardiac Enzymes Lab Results   Component Value Date/Time     08/30/2010 12:17 PM        Lactic Acid    Thyroid Studies          All Micro Results     Procedure Component Value Units Date/Time    COVID-19 RAPID TEST [[id number]]  (Abnormal) Collected: 01/09/22 2042    Order Status: Completed Specimen: Nasopharyngeal Updated: 01/09/22 6040     Specimen source Nasopharyngeal        COVID-19 rapid test Detected        Comment:      The specimen is POSITIVE for SARS-CoV-2, the novel coronavirus associated with COVID-19. This test has been authorized by the FDA under an Emergency Use Authorization (EUA) for use by authorized laboratories. Fact sheet for Healthcare Providers: ConventionUpdate.co.nz  Fact sheet for Patients: ConventionUpdate.co.nz       Methodology: Isothermal Nucleic Acid Amplification  CALLED TO AND CORRECTLY REPEATED BY:  SEDA TRINIDAD RN IN ED AT 8359 ON 01/9/22 TO [facility]                 Images:    CT (Most Recent). XRAY (Most Recent) XR Results (most recent):  Results from Hospital Encounter encounter on 01/09/22    XR CHEST PORT    Narrative  EXAM: Chest Radiograph    INDICATION:  sob/hypoxic    TECHNIQUE: AP view of the chest    COMPARISON: 4/8/2007    FINDINGS:  Patchy bilateral opacities greater on the right lung field. No effusions  identified. No pneumothorax identified. There is partial obscuration of the right heart border. The osseous structures are unremarkable. Impression  1. Patchy bilateral opacities greater in the right lung. May represent  infectious process including covid 19 pneumonia. EKG No results found for this or any previous visit.      2D ECHO

## 2022-01-11 NOTE — ED NOTES
Shift report given to [address] [place] [address] (oncoming nurse) by Juanito RN (off going nurse)

## 2022-01-11 NOTE — DIABETES MGMT
Diabetes/ Glycemic Control Plan of Care    01/10: Patient was admitted on 01/09/2022 with report of shortness of breath, weakness and recent exposure to COVID-19. Recommendations:   1.) correctional lispro insulin as ordered. 2.) increase basal lantus insulin dose from 15 units to 18 units daily starting today. 01/11: Patient remain in ED and noted elevated BG values of 434 (424 with repeat), 408 and 344 since placed on lantus insulin 15 units daily and correctional lispro insulin yesterday. Assessment:   DX:   1. COVID        Fasting/ Morning blood glucose:   Lab Results   Component Value Date/Time    Glucose 363 (H) 01/11/2022 04:56 AM    Glucose (POC) 344 (H) 01/11/2022 08:25 AM     IV Fluids containing dextrose:  None    Steroids:   Rx Glucocorticoids (24h ago, onward)             Start     Dose Route Frequency Ordered Stop    01/10/22 0057  dexamethasone (DECADRON) 4 mg/mL injection 6 mg         6 mg IV EVERY 12 HOURS 01/10/22 0056 --               Rx Glucocorticoids (24h ago, onward)             Start     Dose Route Frequency Ordered Stop    01/10/22 0057  dexamethasone (DECADRON) 4 mg/mL injection 6 mg         6 mg IV EVERY 12 HOURS 01/10/22 0056 --                 Blood glucose values: Within target range (70-180mg/dL): No    Current insulin orders:   Correctional lispro insulin. Very resistant dose  Basal lantus insulin 15 units daily    Total Daily Dose previous 24 hours: None    Current A1c:   Lab Results   Component Value Date/Time    Hemoglobin A1c 7.8 (H) 01/10/2022 05:10 AM      equivalent  to ave Blood Glucose of 177 mg/dl for 2-3 months prior to admission. Adequate glycemic control PTA: No    Nutrition/Diet:   Active Orders   Diet    ADULT DIET Regular; 4 carb choices (60 gm/meal); Low Fat/Low Chol/High Fiber/2 gm Na      Meal Intake:  No data found. Supplement Intake:  No data found.     Home diabetes medications:   Key Antihyperglycemic Medications SITagliptin-metFORMIN (JANUMET) 50-1,000 mg per tablet Take 1 Tab by mouth two (2) times daily (with meals).           Plan/Goals:   Blood glucose will be within target of 70 - 180 mg/dl within 72 hours       Education:  [] Refer to Diabetes Education Record                       [x] Education not indicated at this time     Ling Fields RN  Pager: [phone]

## 2022-01-11 NOTE — PROGRESS NOTES
Infectious Disease progress Note        Reason: Severe COVID-19 pneumonia    Current abx Prior abx   Ceftriaxone, azithromycin since 1/10/2022      Lines:       Assessment :  46 y.o. male who has a history of hypertension, type 2 diabetes presented to the emergency room on 1/9/22 with shortness of breath. Clinical presentation consistent with acute hypoxic respiratory failure-present on admission due to confirmed COVID-19 pneumonia, severe    Mild elevated procalcitonin-could be due to acute kidney injury. Unable to exclude superimposed bacterial pneumonia with full certainty    Acute kidney injury-likely secondary to volume depletion. Monitor for COVID-19 associated nephropathy/ATN    Elevated [id number] due to  COVID-19 associated hypercoagulability. Evidence of DVT noted on venous duplex 1/10/22    Clinically better. Improved hypoxia    Recommendations:    1. Continue Decadron. 10 mg IV every 12 hours. D/c baricitinib since it can worsen venous thrombo embolism  2. D/c ceftriaxone, azithromycin   3. Therapeutic anticoagulation per primary team  4. Monitor crp, procalcitonin, oxygenation       Above plan was discussed in details with patient,RN and dr Parvin Lara. Please call me if any further questions or concerns. Will continue to participate in the care of this patient. HPI:      Feels better. Denies any chest pain, abdominal pain, diarrhea/constipation. Past Surgical History:   Procedure Laterality Date    HX HIP FRACTURE TX      left    HX ORTHOPAEDIC      hip       Patient's Medications   Start Taking    No medications on file   Continue Taking    AMLODIPINE-OLMESARTAN 10-40 MG TAB    Take  by mouth. HYDROCODONE-ACETAMINOPHEN (NORCO) 5-325 MG PER TABLET    Take 1 Tab by mouth every four (4) hours as needed for Pain. METOPROLOL (LOPRESSOR) 50 MG TABLET    Take 1 Tab by mouth two (2) times a day.     SITAGLIPTIN-METFORMIN (JANUMET) 50-1,000 MG PER TABLET    Take 1 Tab by mouth two (2) times daily (with meals). These Medications have changed    No medications on file   Stop Taking    No medications on file       Current Facility-Administered Medications   Medication Dose Route Frequency    sodium chloride (NS) flush 5-40 mL  5-40 mL IntraVENous Q8H    sodium chloride (NS) flush 5-40 mL  5-40 mL IntraVENous PRN    acetaminophen (TYLENOL) tablet 650 mg  650 mg Oral Q6H PRN    Or    acetaminophen (TYLENOL) suppository 650 mg  650 mg Rectal Q6H PRN    polyethylene glycol (MIRALAX) packet 17 g  17 g Oral DAILY PRN    ondansetron (ZOFRAN ODT) tablet 4 mg  4 mg Oral Q8H PRN    Or    ondansetron (ZOFRAN) injection 4 mg  4 mg IntraVENous Q6H PRN    albuterol-ipratropium (DUO-NEB) 2.5 MG-0.5 MG/3 ML  3 mL Nebulization Q4H RT    azithromycin (ZITHROMAX) 500 mg in 0.9% sodium chloride 250 mL (VIAL-MATE)  500 mg IntraVENous Q24H    0.9% sodium chloride infusion  75 mL/hr IntraVENous CONTINUOUS    insulin lispro (HUMALOG) injection   SubCUTAneous AC&HS    glucose chewable tablet 16 g  4 Tablet Oral PRN    glucagon (GLUCAGEN) injection 1 mg  1 mg IntraMUSCular PRN    dextrose (D50W) injection syrg 12.5-25 g  25-50 mL IntraVENous PRN    famotidine (PEPCID) tablet 20 mg  20 mg Oral DAILY    melatonin tablet 5 mg  5 mg Oral QHS    cholecalciferol (VITAMIN D3) capsule 5,000 Units  5,000 Units Oral DAILY    insulin glargine (LANTUS) injection 15 Units  15 Units SubCUTAneous DAILY    cefTRIAXone (ROCEPHIN) 2 g in sterile water (preservative free) 20 mL IV syringe  2 g IntraVENous Q24H    dexamethasone (DECADRON) 4 mg/mL injection 10 mg  10 mg IntraVENous Q12H    baricitinib (OLUMIANT) tablet 2 mg  2 mg Oral Q24H    heparin 25,000 units in  ml infusion  18-36 Units/kg/hr IntraVENous TITRATE     Current Outpatient Medications   Medication Sig    amLODIPine-Olmesartan 10-40 mg tab Take  by mouth.     SITagliptin-metFORMIN (JANUMET) 50-1,000 mg per tablet Take 1 Tab by mouth two (2) times daily (with meals).  metoprolol (LOPRESSOR) 50 mg tablet Take 1 Tab by mouth two (2) times a day.  HYDROcodone-acetaminophen (NORCO) 5-325 mg per tablet Take 1 Tab by mouth every four (4) hours as needed for Pain. Allergies: Grape    Family History   Problem Relation Age of Onset    Stroke Neg Hx     Hypertension Neg Hx     Heart Disease Neg Hx     Diabetes Neg Hx     Cancer Neg Hx      Social History     Socioeconomic History    Marital status:      Spouse name: Not on file    Number of children: Not on file    Years of education: Not on file    Highest education level: Not on file   Occupational History    Not on file   Tobacco Use    Smoking status: Never Smoker    Smokeless tobacco: Never Used   Substance and Sexual Activity    Alcohol use: No    Drug use: No    Sexual activity: Not on file   Other Topics Concern    Not on file   Social History Narrative    Not on file     Social Determinants of Health     Financial Resource Strain:     Difficulty of Paying Living Expenses: Not on file   Food Insecurity:     Worried About Running Out of Food in the Last Year: Not on file    Michael of Food in the Last Year: Not on file   Transportation Needs:     Lack of Transportation (Medical): Not on file    Lack of Transportation (Non-Medical):  Not on file   Physical Activity:     Days of Exercise per Week: Not on file    Minutes of Exercise per Session: Not on file   Stress:     Feeling of Stress : Not on file   Social Connections:     Frequency of Communication with Friends and Family: Not on file    Frequency of Social Gatherings with Friends and Family: Not on file    Attends Yazdanism Services: Not on file    Active Member of Clubs or Organizations: Not on file    Attends Club or Organization Meetings: Not on file    Marital Status: Not on file   Intimate Partner Violence:     Fear of Current or Ex-Partner: Not on file    Emotionally Abused: Not on file    Physically Abused: Not on file    Sexually Abused: Not on file   Housing Stability:     Unable to Pay for Housing in the Last Year: Not on file    Number of Places Lived in the Last Year: Not on file    Unstable Housing in the Last Year: Not on file     Social History     Tobacco Use   Smoking Status Never Smoker   Smokeless Tobacco Never Used        Temp (24hrs), Av.3 °F (36.8 °C), Min:98.3 °F (36.8 °C), Max:98.3 °F (36.8 °C)    Visit Vitals  /80   Pulse 97   Temp 98.3 °F (36.8 °C)   Resp (!) 35   Ht 5' 11\" (1.803 m)   Wt 108.9 kg (240 lb)   SpO2 97%   BMI 33.47 kg/m²       ROS: 12 point ROS obtained in details. Pertinent positives as mentioned in HPI,   otherwise negative    Physical Exam:    Vitals signs and nursing note reviewed. Constitutional:       Sitting on bed, nonrebreather mask on face, in no apparent distress  HENT:      Head: Normocephalic. Eyes:      Conjunctiva/sclera: Conjunctivae normal.      Neck:      Musculoskeletal: Normal range of motion and neck supple. Cardiovascular:      Rate and Rhythm: Normal rate and regular rhythm on monitor  Chest:      Bilateral chest movements equal.  Auscultation deferred due to Covid positive  Abdominal:      General: There is no distension. Palpations: Abdomen is soft. Tenderness: There is no abdominal tenderness. There is no rebound. Musculoskeletal: Normal range of motion. General: No tenderness. Skin:     General: Skin is warm and dry. Findings: No rash. Neurological:      Mental Status: He is alert and oriented to person, place, and time. Cranial Nerves: No cranial nerve deficit. Motor: No abnormal muscle tone. Coordination: Coordination normal.   Psychiatric:         Behavior: Behavior normal.         Thought Content:  Thought content normal.         Judgment: Judgment normal.   Labs: Results:   Chemistry Recent Labs     22  0456 224   * 312*    138   K 4.4 5.0    104 CO2 26 24   BUN 52* 43*   CREA 2.24* 2.49*   CA 8.5 8.2*   AGAP 9 10   BUCR 23* 17   AP 43* 41*   TP 7.6 7.9   ALB 2.5* 2.8*   GLOB 5.1* 5.1*   AGRAT 0.5* 0.5*      CBC w/Diff Recent Labs     01/11/22  0456 01/09/22  2034   WBC 9.3 6.0   RBC 4.63 4.94   HGB 12.9* 13.7   HCT 39.9 42.0    105*   GRANS 88* 83*   LYMPH 6* 11*   EOS 0 0      Microbiology No results for input(s): CULT in the last 72 hours. RADIOLOGY:    All available imaging studies/reports in [facility] for this admission were reviewed  High complexity decision making was performed during the evaluation of this patient at high risk for decompensation with multiple organ involvement         Disclaimer: Sections of this note are dictated utilizing voice recognition software, which may have resulted in some phonetic based errors in grammar and contents. Even though attempts were made to correct all the mistakes, some may have been missed, and remained in the body of the document. If questions arise, please contact our department.     Dr. Harish Hernandez, Infectious Disease Specialist  [phone]  January 11, 2022  9:00 AM

## 2022-01-11 NOTE — CONSULTS
Consult Note        Consult requested by: Asia Hill MD    ADMIT DATE: 1/9/2022  CONSULT DATE: January 11, 2022                 Admission diagnosis: Respiratory failure   Reason for Nephrology Consultation: MONICA      Assessment and Plan:  #1 acute kidney injury, baseline creatinine 2019 was 1.5, no labs available since then. Suspect prerenal azotemia   in the setting of COVID 19 pneumonia versus COVID nephropathy. Urine tests not available yet. CT abd with no hydro   #2. Acute hypoxic respiratory failure secondary to COVID-19 pneumonia  #3 COVID-19 pneumonia  #4 hypertension  #5 type 2 diabetes  #6 elevated troponins  #7 transaminitis  #8 hypoalbuminemia  #9 echocardiogram with normal EF but concentric hypertrophy likely from hypertension    Plan:    #1 strict ins and outs, daily weights  #2 gentle hydration with LR at 75 cc an hour  #3 check CK levels  #4 urine studies ordered  #5 check BNP  #6 wean O2 as tolerated  #7 follow ID recommendations  #8 avoid IV contrast nephrotoxins  #9 renally dose medications for EGFR of less than 30    Please call with questions,    Jerry Harmon MD [facility]  Cell [phone]  Pager: [phone]    HPI:     Patient is a 71-year-old -American male with longstanding history of hypertension, type 2 diabetes who presented to the emergency room with progressive shortness of breath. Not vaccinated for COVID-19, had a recent exposure. On presentation he was noted to be hypoxic, placed on nonrebreather mask at 15 L/min, labs suggestive of normal white count, electrolytes were in good range, acid-base okay, BUN of 52 creatinine of 2.49 which improved to 2.24 overnight. Albumin was low at 2.5 AST ALT slightly elevated at 82 and 60. BNP was elevated at 1666. Hemoglobin A1c of 7.8. CRP was elevated at 12.9. Patient was started on gentle hydration with normal saline 75 cc an hour.   Echocardiogram was done which showed concentric hypertrophy with normal LV EF of 60 to 65%, RV was hyperdynamic. No effusions. Chest x-ray shows bilateral patchy basilar opacities greater in the right lung. Past Medical History:   Diagnosis Date    CVA (cerebral vascular accident) ([facility] [address]     right-sided deficit    Diabetes ([facility] [address]     HTN (hypertension)       Past Surgical History:   Procedure Laterality Date    HX HIP FRACTURE TX      left    HX ORTHOPAEDIC      hip       Social History     Socioeconomic History    Marital status:      Spouse name: Not on file    Number of children: Not on file    Years of education: Not on file    Highest education level: Not on file   Occupational History    Not on file   Tobacco Use    Smoking status: Never Smoker    Smokeless tobacco: Never Used   Substance and Sexual Activity    Alcohol use: No    Drug use: No    Sexual activity: Not on file   Other Topics Concern    Not on file   Social History Narrative    Not on file     Social Determinants of Health     Financial Resource Strain:     Difficulty of Paying Living Expenses: Not on file   Food Insecurity:     Worried About Running Out of Food in the Last Year: Not on file    Michael of Food in the Last Year: Not on file   Transportation Needs:     Lack of Transportation (Medical): Not on file    Lack of Transportation (Non-Medical):  Not on file   Physical Activity:     Days of Exercise per Week: Not on file    Minutes of Exercise per Session: Not on file   Stress:     Feeling of Stress : Not on file   Social Connections:     Frequency of Communication with Friends and Family: Not on file    Frequency of Social Gatherings with Friends and Family: Not on file    Attends Sabianism Services: Not on file    Active Member of Clubs or Organizations: Not on file    Attends Club or Organization Meetings: Not on file    Marital Status: Not on file   Intimate Partner Violence:     Fear of Current or Ex-Partner: Not on file    Emotionally Abused: Not on file    Physically Abused: Not on file    Sexually Abused: Not on file   Housing Stability:     Unable to Pay for Housing in the Last Year: Not on file    Number of Places Lived in the Last Year: Not on file    Unstable Housing in the Last Year: Not on file       Family History   Problem Relation Age of Onset    Stroke Neg Hx     Hypertension Neg Hx     Heart Disease Neg Hx     Diabetes Neg Hx     Cancer Neg Hx      Allergies   Allergen Reactions    Grape Hives        Home Medications:   (Not in a hospital admission)      Current Inpatient Medications:     Current Facility-Administered Medications   Medication Dose Route Frequency    [START ON 1/12/2022] insulin glargine (LANTUS) injection 25 Units  25 Units SubCUTAneous DAILY    sodium chloride (NS) flush 5-40 mL  5-40 mL IntraVENous Q8H    sodium chloride (NS) flush 5-40 mL  5-40 mL IntraVENous PRN    acetaminophen (TYLENOL) tablet 650 mg  650 mg Oral Q6H PRN    Or    acetaminophen (TYLENOL) suppository 650 mg  650 mg Rectal Q6H PRN    polyethylene glycol (MIRALAX) packet 17 g  17 g Oral DAILY PRN    ondansetron (ZOFRAN ODT) tablet 4 mg  4 mg Oral Q8H PRN    Or    ondansetron (ZOFRAN) injection 4 mg  4 mg IntraVENous Q6H PRN    albuterol-ipratropium (DUO-NEB) 2.5 MG-0.5 MG/3 ML  3 mL Nebulization Q4H RT    azithromycin (ZITHROMAX) 500 mg in 0.9% sodium chloride 250 mL (VIAL-MATE)  500 mg IntraVENous Q24H    0.9% sodium chloride infusion  75 mL/hr IntraVENous CONTINUOUS    insulin lispro (HUMALOG) injection   SubCUTAneous AC&HS    glucose chewable tablet 16 g  4 Tablet Oral PRN    glucagon (GLUCAGEN) injection 1 mg  1 mg IntraMUSCular PRN    dextrose (D50W) injection syrg 12.5-25 g  25-50 mL IntraVENous PRN    famotidine (PEPCID) tablet 20 mg  20 mg Oral DAILY    melatonin tablet 5 mg  5 mg Oral QHS    cholecalciferol (VITAMIN D3) capsule 5,000 Units  5,000 Units Oral DAILY    cefTRIAXone (ROCEPHIN) 2 g in sterile water (preservative free) 20 mL IV syringe  2 g IntraVENous Q24H    dexamethasone (DECADRON) 4 mg/mL injection 10 mg  10 mg IntraVENous Q12H    baricitinib (OLUMIANT) tablet 2 mg  2 mg Oral Q24H    heparin 25,000 units in  ml infusion  18-36 Units/kg/hr IntraVENous TITRATE     Current Outpatient Medications   Medication Sig    amLODIPine-Olmesartan 10-40 mg tab Take  by mouth.  SITagliptin-metFORMIN (JANUMET) 50-1,000 mg per tablet Take 1 Tab by mouth two (2) times daily (with meals).  metoprolol (LOPRESSOR) 50 mg tablet Take 1 Tab by mouth two (2) times a day.  HYDROcodone-acetaminophen (NORCO) 5-325 mg per tablet Take 1 Tab by mouth every four (4) hours as needed for Pain. Review of Systems:   No fever or chills. No sore throat. No cough or hemoptysis. No shortness of breath or chest pain. No orthopnea or paroxysmal nocturnal dyspnea. Good appetite. No nausea, vomiting, abdominal pain, melena or hematochezia. No constipation or diarrhea. No dysuria, no gross hematuria of voiding difficulties. No ankle swelling, no joint paints. No muscle aches. No skin changes. No dizziness or lightheadedness. No headaches. Physical Assessment:     Vitals:    01/10/22 1600 01/10/22 1730 01/10/22 1900 01/11/22 1058   BP: 113/78 111/76 118/80    Pulse: 89 93 97    Resp: (!) 33 (!) 32 (!) 35    Temp:    98.3 °F (36.8 °C)   SpO2: 95% 97%     Weight:       Height:         Last 3 Recorded Weights in this Encounter    01/09/22 2027 01/10/22 1422   Weight: 108.9 kg (240 lb) 108.9 kg (240 lb)     Admission weight: Weight: 108.9 kg (240 lb) (01/09/22 2027)      Intake/Output Summary (Last 24 hours) at 1/11/2022 1531  Last data filed at 1/11/2022 0344  Gross per 24 hour   Intake 500 ml   Output 1800 ml   Net -1300 ml       HFNC   Mild distress   HEENT: mmm  Lungs: alicja coarse BS   Cardiovascular system: S1, S2, regular rate and rhythm. Abdomen: soft, non tender, non distended. Extremities: no edema   Integumentary: skin is grossly intact.    Neurologic: Alert, oriented time three. Data Review:    Labs: Results:       Chemistry Recent Labs     01/11/22 0456 01/09/22 2034   * 312*    138   K 4.4 5.0    104   CO2 26 24   BUN 52* 43*   CREA 2.24* 2.49*   CA 8.5 8.2*   AGAP 9 10   BUCR 23* 17   AP 43* 41*   TP 7.6 7.9   ALB 2.5* 2.8*   GLOB 5.1* 5.1*   AGRAT 0.5* 0.5*         CBC w/Diff Recent Labs     01/11/22 0456 01/09/22 2034   WBC 9.3 6.0   RBC 4.63 4.94   HGB 12.9* 13.7   HCT 39.9 42.0    105*   GRANS 88* 83*   LYMPH 6* 11*   EOS 0 0         Iron/Ferritin No results for input(s): IRON in the last 72 hours. No lab exists for component: TIBCCALC   PTH/VIT D No results for input(s): PTH in the last 72 hours.     No lab exists for component: VITD           Lashell Yang MD  1/11/2022  3:31 PM      January 11, 2022      Assessment and plan:

## 2022-01-12 PROBLEM — M62.82 NON-TRAUMATIC RHABDOMYOLYSIS: Status: ACTIVE | Noted: 2022-01-12

## 2022-01-12 LAB
ALBUMIN SERPL-MCNC: 2.3 G/DL (ref 3.4–5)
ALBUMIN/GLOB SERPL: 0.5 {RATIO} (ref 0.8–1.7)
ALP SERPL-CCNC: 53 U/L (ref 45–117)
ALT SERPL-CCNC: 79 U/L (ref 16–61)
ANION GAP SERPL CALC-SCNC: 7 MMOL/L (ref 3–18)
APPEARANCE UR: CLEAR
APTT PPP: 141.1 SEC (ref 23–36.4)
APTT PPP: 54.3 SEC (ref 23–36.4)
APTT PPP: 57.6 SEC (ref 23–36.4)
AST SERPL-CCNC: 45 U/L (ref 10–38)
BASOPHILS # BLD: 0 K/UL (ref 0–0.1)
BASOPHILS NFR BLD: 0 % (ref 0–2)
BILIRUB SERPL-MCNC: 0.7 MG/DL (ref 0.2–1)
BILIRUB UR QL: NEGATIVE
BUN SERPL-MCNC: 46 MG/DL (ref 7–18)
BUN/CREAT SERPL: 28 (ref 12–20)
CALCIUM SERPL-MCNC: 8.6 MG/DL (ref 8.5–10.1)
CHLORIDE SERPL-SCNC: 112 MMOL/L (ref 100–111)
CO2 SERPL-SCNC: 22 MMOL/L (ref 21–32)
COLOR UR: YELLOW
CREAT SERPL-MCNC: 1.67 MG/DL (ref 0.6–1.3)
CREAT UR-MCNC: 85 MG/DL (ref 30–125)
CRP SERPL-MCNC: 6 MG/DL (ref 0–0.3)
DIFFERENTIAL METHOD BLD: ABNORMAL
EOSINOPHIL # BLD: 0 K/UL (ref 0–0.4)
EOSINOPHIL NFR BLD: 0 % (ref 0–5)
EPITH CASTS URNS QL MICRO: ABNORMAL /LPF (ref 0–5)
ERYTHROCYTE [DISTWIDTH] IN BLOOD BY AUTOMATED COUNT: 14.2 % (ref 11.6–14.5)
GLOBULIN SER CALC-MCNC: 4.7 G/DL (ref 2–4)
GLUCOSE BLD STRIP.AUTO-MCNC: 221 MG/DL (ref 70–110)
GLUCOSE BLD STRIP.AUTO-MCNC: 261 MG/DL (ref 70–110)
GLUCOSE BLD STRIP.AUTO-MCNC: 289 MG/DL (ref 70–110)
GLUCOSE BLD STRIP.AUTO-MCNC: 343 MG/DL (ref 70–110)
GLUCOSE SERPL-MCNC: 261 MG/DL (ref 74–99)
GLUCOSE UR STRIP.AUTO-MCNC: >1000 MG/DL
GRAN CASTS URNS QL MICRO: ABNORMAL /LPF
HCT VFR BLD AUTO: 36.8 % (ref 36–48)
HGB BLD-MCNC: 11.6 G/DL (ref 13–16)
HGB UR QL STRIP: ABNORMAL
HYALINE CASTS URNS QL MICRO: ABNORMAL /LPF (ref 0–2)
IMM GRANULOCYTES # BLD AUTO: 0.2 K/UL (ref 0–0.04)
IMM GRANULOCYTES NFR BLD AUTO: 2 % (ref 0–0.5)
KETONES UR QL STRIP.AUTO: NEGATIVE MG/DL
LEUKOCYTE ESTERASE UR QL STRIP.AUTO: NEGATIVE
LYMPHOCYTES # BLD: 0.5 K/UL (ref 0.9–3.6)
LYMPHOCYTES NFR BLD: 5 % (ref 21–52)
MCH RBC QN AUTO: 28 PG (ref 24–34)
MCHC RBC AUTO-ENTMCNC: 31.5 G/DL (ref 31–37)
MCV RBC AUTO: 88.7 FL (ref 78–100)
MONOCYTES # BLD: 0.3 K/UL (ref 0.05–1.2)
MONOCYTES NFR BLD: 3 % (ref 3–10)
NEUTS SEG # BLD: 8.4 K/UL (ref 1.8–8)
NEUTS SEG NFR BLD: 90 % (ref 40–73)
NITRITE UR QL STRIP.AUTO: NEGATIVE
NRBC # BLD: 0 K/UL (ref 0–0.01)
NRBC BLD-RTO: 0 PER 100 WBC
PH UR STRIP: 5 [PH] (ref 5–8)
PLATELET # BLD AUTO: 160 K/UL (ref 135–420)
PMV BLD AUTO: 13.2 FL (ref 9.2–11.8)
POTASSIUM SERPL-SCNC: 4.2 MMOL/L (ref 3.5–5.5)
PROCALCITONIN SERPL-MCNC: 0.59 NG/ML
PROT SERPL-MCNC: 7 G/DL (ref 6.4–8.2)
PROT UR STRIP-MCNC: 30 MG/DL
RBC # BLD AUTO: 4.15 M/UL (ref 4.35–5.65)
RBC #/AREA URNS HPF: ABNORMAL /HPF (ref 0–5)
SODIUM SERPL-SCNC: 141 MMOL/L (ref 136–145)
SODIUM UR-SCNC: 84 MMOL/L (ref 20–110)
SP GR UR REFRACTOMETRY: 1.03 (ref 1–1.03)
UROBILINOGEN UR QL STRIP.AUTO: 1 EU/DL (ref 0.2–1)
WBC # BLD AUTO: 9.4 K/UL (ref 4.6–13.2)

## 2022-01-12 PROCEDURE — 74011000250 HC RX REV CODE- 250: Performed by: HOSPITALIST

## 2022-01-12 PROCEDURE — 82570 ASSAY OF URINE CREATININE: CPT

## 2022-01-12 PROCEDURE — 74011250636 HC RX REV CODE- 250/636: Performed by: INTERNAL MEDICINE

## 2022-01-12 PROCEDURE — 85730 THROMBOPLASTIN TIME PARTIAL: CPT

## 2022-01-12 PROCEDURE — 86140 C-REACTIVE PROTEIN: CPT

## 2022-01-12 PROCEDURE — 74011636637 HC RX REV CODE- 636/637: Performed by: INTERNAL MEDICINE

## 2022-01-12 PROCEDURE — 81002 URINALYSIS NONAUTO W/O SCOPE: CPT

## 2022-01-12 PROCEDURE — 80053 COMPREHEN METABOLIC PANEL: CPT

## 2022-01-12 PROCEDURE — 84300 ASSAY OF URINE SODIUM: CPT

## 2022-01-12 PROCEDURE — 82962 GLUCOSE BLOOD TEST: CPT

## 2022-01-12 PROCEDURE — 84145 PROCALCITONIN (PCT): CPT

## 2022-01-12 PROCEDURE — 74011250636 HC RX REV CODE- 250/636: Performed by: FAMILY MEDICINE

## 2022-01-12 PROCEDURE — 74011636637 HC RX REV CODE- 636/637: Performed by: FAMILY MEDICINE

## 2022-01-12 PROCEDURE — 74011250637 HC RX REV CODE- 250/637: Performed by: INTERNAL MEDICINE

## 2022-01-12 PROCEDURE — 85025 COMPLETE CBC W/AUTO DIFF WBC: CPT

## 2022-01-12 PROCEDURE — 83874 ASSAY OF MYOGLOBIN: CPT

## 2022-01-12 PROCEDURE — 94640 AIRWAY INHALATION TREATMENT: CPT

## 2022-01-12 PROCEDURE — 36415 COLL VENOUS BLD VENIPUNCTURE: CPT

## 2022-01-12 PROCEDURE — 81001 URINALYSIS AUTO W/SCOPE: CPT

## 2022-01-12 PROCEDURE — 65660000004 HC RM CVT STEPDOWN

## 2022-01-12 PROCEDURE — 65270000029 HC RM PRIVATE

## 2022-01-12 PROCEDURE — 99232 SBSQ HOSP IP/OBS MODERATE 35: CPT | Performed by: FAMILY MEDICINE

## 2022-01-12 RX ORDER — INSULIN GLARGINE 100 [IU]/ML
30 INJECTION, SOLUTION SUBCUTANEOUS DAILY
Status: DISCONTINUED | OUTPATIENT
Start: 2022-01-13 | End: 2022-01-14

## 2022-01-12 RX ORDER — INSULIN GLARGINE 100 [IU]/ML
5 INJECTION, SOLUTION SUBCUTANEOUS ONCE
Status: COMPLETED | OUTPATIENT
Start: 2022-01-12 | End: 2022-01-12

## 2022-01-12 RX ORDER — HEPARIN SODIUM 1000 [USP'U]/ML
80 INJECTION, SOLUTION INTRAVENOUS; SUBCUTANEOUS ONCE
Status: COMPLETED | OUTPATIENT
Start: 2022-01-12 | End: 2022-01-12

## 2022-01-12 RX ORDER — HEPARIN SODIUM 1000 [USP'U]/ML
40 INJECTION, SOLUTION INTRAVENOUS; SUBCUTANEOUS ONCE
Status: COMPLETED | OUTPATIENT
Start: 2022-01-12 | End: 2022-01-12

## 2022-01-12 RX ADMIN — SODIUM CHLORIDE, PRESERVATIVE FREE 10 ML: 5 INJECTION INTRAVENOUS at 18:44

## 2022-01-12 RX ADMIN — SODIUM CHLORIDE, PRESERVATIVE FREE 10 ML: 5 INJECTION INTRAVENOUS at 00:32

## 2022-01-12 RX ADMIN — DEXAMETHASONE SODIUM PHOSPHATE 10 MG: 4 INJECTION, SOLUTION INTRAMUSCULAR; INTRAVENOUS at 08:07

## 2022-01-12 RX ADMIN — IPRATROPIUM BROMIDE AND ALBUTEROL SULFATE 3 ML: .5; 3 SOLUTION RESPIRATORY (INHALATION) at 00:00

## 2022-01-12 RX ADMIN — Medication 5 MG: at 00:32

## 2022-01-12 RX ADMIN — HEPARIN SODIUM 4360 UNITS: 1000 INJECTION, SOLUTION INTRAVENOUS; SUBCUTANEOUS at 02:46

## 2022-01-12 RX ADMIN — IPRATROPIUM BROMIDE AND ALBUTEROL SULFATE 3 ML: .5; 3 SOLUTION RESPIRATORY (INHALATION) at 08:07

## 2022-01-12 RX ADMIN — IPRATROPIUM BROMIDE AND ALBUTEROL SULFATE 3 ML: .5; 3 SOLUTION RESPIRATORY (INHALATION) at 18:43

## 2022-01-12 RX ADMIN — Medication 5000 UNITS: at 08:07

## 2022-01-12 RX ADMIN — HEPARIN SODIUM 8710 UNITS: 1000 INJECTION, SOLUTION INTRAVENOUS; SUBCUTANEOUS at 18:42

## 2022-01-12 RX ADMIN — Medication 6 UNITS: at 07:30

## 2022-01-12 RX ADMIN — Medication 25 UNITS: at 08:07

## 2022-01-12 RX ADMIN — DEXAMETHASONE SODIUM PHOSPHATE 10 MG: 4 INJECTION, SOLUTION INTRAMUSCULAR; INTRAVENOUS at 23:32

## 2022-01-12 RX ADMIN — SODIUM CHLORIDE, SODIUM LACTATE, POTASSIUM CHLORIDE, AND CALCIUM CHLORIDE 75 ML/HR: 600; 310; 30; 20 INJECTION, SOLUTION INTRAVENOUS at 08:15

## 2022-01-12 RX ADMIN — IPRATROPIUM BROMIDE AND ALBUTEROL SULFATE 3 ML: .5; 3 SOLUTION RESPIRATORY (INHALATION) at 23:32

## 2022-01-12 RX ADMIN — Medication 9 UNITS: at 23:47

## 2022-01-12 RX ADMIN — Medication 5 UNITS: at 14:31

## 2022-01-12 RX ADMIN — IPRATROPIUM BROMIDE AND ALBUTEROL SULFATE 3 ML: .5; 3 SOLUTION RESPIRATORY (INHALATION) at 05:02

## 2022-01-12 RX ADMIN — Medication 9 UNITS: at 18:43

## 2022-01-12 RX ADMIN — FAMOTIDINE 20 MG: 20 TABLET ORAL at 08:07

## 2022-01-12 RX ADMIN — DEXAMETHASONE SODIUM PHOSPHATE 10 MG: 4 INJECTION, SOLUTION INTRAMUSCULAR; INTRAVENOUS at 00:32

## 2022-01-12 RX ADMIN — Medication 12 UNITS: at 14:30

## 2022-01-12 RX ADMIN — Medication 5 MG: at 23:33

## 2022-01-12 NOTE — DIABETES MGMT
Diabetes/ Glycemic Control Plan of Care    Patient was admitted on 01/09/2022 with report of shortness of breath, weakness and recent exposure to COVID-19. Recommendations:   1.) correctional lispro insulin as ordered. 2.) increase basal lantus insulin dose from 25 units to 30 units daily starting today. Order obtained. 01/12: Patient remain in ED and noted BG values still elevated after increasing lantus insulin dose yesterday. Assessment:   DX:   1. COVID        Fasting/ Morning blood glucose:   Lab Results   Component Value Date/Time    Glucose 261 (H) 01/12/2022 04:32 AM    Glucose (POC) 343 (H) 01/12/2022 12:18 PM     IV Fluids containing dextrose:  None    Steroids:   Rx Glucocorticoids (24h ago, onward)             Start     Dose Route Frequency Ordered Stop    01/10/22 0057  dexamethasone (DECADRON) 4 mg/mL injection 6 mg         6 mg IV EVERY 12 HOURS 01/10/22 0056 --               Rx Glucocorticoids (24h ago, onward)             Start     Dose Route Frequency Ordered Stop    01/10/22 0057  dexamethasone (DECADRON) 4 mg/mL injection 6 mg         6 mg IV EVERY 12 HOURS 01/10/22 0056 --                 Blood glucose values: Within target range (70-180mg/dL): No    Current insulin orders:   Correctional lispro insulin. Very resistant dose  Basal lantus insulin 30 units daily    Total Daily Dose previous 24 hours: 79 units  Lantus: 25 units  Lispro: 54 units    Current A1c:   Lab Results   Component Value Date/Time    Hemoglobin A1c 7.8 (H) 01/10/2022 05:10 AM      equivalent  to ave Blood Glucose of 177 mg/dl for 2-3 months prior to admission. Adequate glycemic control PTA: No    Nutrition/Diet:   Active Orders   Diet    ADULT DIET Regular; 4 carb choices (60 gm/meal); Low Fat/Low Chol/High Fiber/2 gm Na      Meal Intake:  No data found. Supplement Intake:  No data found.     Home diabetes medications:   Key Antihyperglycemic Medications             SITagliptin-metFORMIN (JANUMET) 50-1,000 mg per tablet Take 1 Tab by mouth two (2) times daily (with meals).           Plan/Goals:   Blood glucose will be within target of 70 - 180 mg/dl within 72 hours       Education:  [] Refer to Diabetes Education Record                       [x] Education not indicated at this time     Jackie Santillan RN  Pager: [phone]

## 2022-01-12 NOTE — PROGRESS NOTES
Infectious Disease progress Note        Reason: Severe COVID-19 pneumonia    Current abx Prior abx   Ceftriaxone, azithromycin  1/10/2022-1/11/22      Lines:       Assessment :  46 y.o. male who has a history of hypertension, type 2 diabetes presented to the emergency room on 1/9/22 with shortness of breath. Clinical presentation consistent with acute hypoxic respiratory failure-present on admission due to confirmed COVID-19 pneumonia, severe    Mild elevated procalcitonin-could be due to acute kidney injury. Unable to exclude superimposed bacterial pneumonia with full certainty    Acute kidney injury-likely secondary to volume depletion. Monitor for COVID-19 associated nephropathy/ATN    Elevated C-uprcy-igymyq due to  COVID-19 associated hypercoagulability. Evidence of DVT noted on venous duplex 1/10/22    Clinically better. Improved hypoxia    Recommendations:    1. Continue Decadron 10 mg IV every 12 hours till 1/14/22 followed by 10 mg iv q 24 hour till 1/19/22. 2.  Hold further abx   3. Therapeutic anticoagulation per primary team  4. Monitor crp, procalcitonin, oxygenation       Above plan was discussed in details with patient,RN. Please call me if any further questions or concerns. Will continue to participate in the care of this patient. HPI:      Feels better. Denies any chest pain, abdominal pain, diarrhea/constipation. Past Surgical History:   Procedure Laterality Date    HX HIP FRACTURE TX      left    HX ORTHOPAEDIC      hip       Patient's Medications   Start Taking    No medications on file   Continue Taking    AMLODIPINE-OLMESARTAN 10-40 MG TAB    Take  by mouth. HYDROCODONE-ACETAMINOPHEN (NORCO) 5-325 MG PER TABLET    Take 1 Tab by mouth every four (4) hours as needed for Pain. METOPROLOL (LOPRESSOR) 50 MG TABLET    Take 1 Tab by mouth two (2) times a day. SITAGLIPTIN-METFORMIN (JANUMET) 50-1,000 MG PER TABLET    Take 1 Tab by mouth two (2) times daily (with meals). These Medications have changed    No medications on file   Stop Taking    No medications on file       Current Facility-Administered Medications   Medication Dose Route Frequency    insulin glargine (LANTUS) injection 25 Units  25 Units SubCUTAneous DAILY    lactated Ringers infusion  75 mL/hr IntraVENous CONTINUOUS    sodium chloride (NS) flush 5-40 mL  5-40 mL IntraVENous Q8H    sodium chloride (NS) flush 5-40 mL  5-40 mL IntraVENous PRN    acetaminophen (TYLENOL) tablet 650 mg  650 mg Oral Q6H PRN    Or    acetaminophen (TYLENOL) suppository 650 mg  650 mg Rectal Q6H PRN    polyethylene glycol (MIRALAX) packet 17 g  17 g Oral DAILY PRN    ondansetron (ZOFRAN ODT) tablet 4 mg  4 mg Oral Q8H PRN    Or    ondansetron (ZOFRAN) injection 4 mg  4 mg IntraVENous Q6H PRN    albuterol-ipratropium (DUO-NEB) 2.5 MG-0.5 MG/3 ML  3 mL Nebulization Q4H RT    insulin lispro (HUMALOG) injection   SubCUTAneous AC&HS    glucose chewable tablet 16 g  4 Tablet Oral PRN    glucagon (GLUCAGEN) injection 1 mg  1 mg IntraMUSCular PRN    dextrose (D50W) injection syrg 12.5-25 g  25-50 mL IntraVENous PRN    famotidine (PEPCID) tablet 20 mg  20 mg Oral DAILY    melatonin tablet 5 mg  5 mg Oral QHS    cholecalciferol (VITAMIN D3) capsule 5,000 Units  5,000 Units Oral DAILY    dexamethasone (DECADRON) 4 mg/mL injection 10 mg  10 mg IntraVENous Q12H    heparin 25,000 units in  ml infusion  18-36 Units/kg/hr IntraVENous TITRATE     Current Outpatient Medications   Medication Sig    amLODIPine-Olmesartan 10-40 mg tab Take  by mouth.  SITagliptin-metFORMIN (JANUMET) 50-1,000 mg per tablet Take 1 Tab by mouth two (2) times daily (with meals).  metoprolol (LOPRESSOR) 50 mg tablet Take 1 Tab by mouth two (2) times a day.  HYDROcodone-acetaminophen (NORCO) 5-325 mg per tablet Take 1 Tab by mouth every four (4) hours as needed for Pain.        Allergies: Grape    Family History   Problem Relation Age of Onset    Stroke Neg Hx     Hypertension Neg Hx     Heart Disease Neg Hx     Diabetes Neg Hx     Cancer Neg Hx      Social History     Socioeconomic History    Marital status:      Spouse name: Not on file    Number of children: Not on file    Years of education: Not on file    Highest education level: Not on file   Occupational History    Not on file   Tobacco Use    Smoking status: Never Smoker    Smokeless tobacco: Never Used   Substance and Sexual Activity    Alcohol use: No    Drug use: No    Sexual activity: Not on file   Other Topics Concern    Not on file   Social History Narrative    Not on file     Social Determinants of Health     Financial Resource Strain:     Difficulty of Paying Living Expenses: Not on file   Food Insecurity:     Worried About Running Out of Food in the Last Year: Not on file    Michael of Food in the Last Year: Not on file   Transportation Needs:     Lack of Transportation (Medical): Not on file    Lack of Transportation (Non-Medical):  Not on file   Physical Activity:     Days of Exercise per Week: Not on file    Minutes of Exercise per Session: Not on file   Stress:     Feeling of Stress : Not on file   Social Connections:     Frequency of Communication with Friends and Family: Not on file    Frequency of Social Gatherings with Friends and Family: Not on file    Attends Yazidism Services: Not on file    Active Member of [address] [place] [address] [employer] or Organizations: Not on file    Attends Club or Organization Meetings: Not on file    Marital Status: Not on file   Intimate Partner Violence:     Fear of Current or Ex-Partner: Not on file    Emotionally Abused: Not on file    Physically Abused: Not on file    Sexually Abused: Not on file   Housing Stability:     Unable to Pay for Housing in the Last Year: Not on file    Number of [place] in the Last Year: Not on file    Unstable Housing in the Last Year: Not on file     Social History     Tobacco Use   Smoking Status Never Smoker   Smokeless Tobacco Never Used        Temp (24hrs), Av.7 °F (37.1 °C), Min:98.3 °F (36.8 °C), Max:99.1 °F (37.3 °C)    Visit Vitals  /79   Pulse 73   Temp 99.1 °F (37.3 °C)   Resp 20   Ht 5' 11\" (1.803 m)   Wt 108.9 kg (240 lb)   SpO2 (!) 84%   BMI 33.47 kg/m²       ROS: 12 point ROS obtained in details. Pertinent positives as mentioned in HPI,   otherwise negative    Physical Exam:    Vitals signs and nursing note reviewed. Constitutional:       Sitting on bed, nonrebreather mask on face, in no apparent distress  HENT:      Head: Normocephalic. Eyes:      Conjunctiva/sclera: Conjunctivae normal.      Neck:      Musculoskeletal: Normal range of motion and neck supple. Cardiovascular:      Rate and Rhythm: Normal rate and regular rhythm on monitor  Chest:      Bilateral chest movements equal.  Auscultation deferred due to Covid positive  Abdominal:      General: There is no distension. Palpations: Abdomen is soft. Tenderness: There is no abdominal tenderness. There is no rebound. Musculoskeletal: Normal range of motion. General: No tenderness. Skin:     General: Skin is warm and dry. Findings: No rash. Neurological:      Mental Status: He is alert and oriented to person, place, and time. Cranial Nerves: No cranial nerve deficit. Motor: No abnormal muscle tone. Coordination: Coordination normal.   Psychiatric:         Behavior: Behavior normal.         Thought Content:  Thought content normal.         Judgment: Judgment normal.   Labs: Results:   Chemistry Recent Labs     22  0432 22  0456 224   * 363* 312*    142 138   K 4.2 4.4 5.0   * 107 104   CO2 22 26 24   BUN 46* 52* 43*   CREA 1.67* 2.24* 2.49*   CA 8.6 8.5 8.2*   AGAP 7 9 10   BUCR 28* 23* 17   AP 53 43* 41*   TP 7.0 7.6 7.9   ALB 2.3* 2.5* 2.8*   GLOB 4.7* 5.1* 5.1*   AGRAT 0.5* 0.5* 0.5*      CBC w/Diff Recent Labs     22  5975 01/11/22  0456 01/09/22  2034   WBC 9.4 9.3 6.0   RBC 4.15* 4.63 4.94   HGB 11.6* 12.9* 13.7   HCT 36.8 39.9 42.0    135 105*   GRANS 90* 88* 83*   LYMPH 5* 6* 11*   EOS 0 0 0      Microbiology No results for input(s): CULT in the last 72 hours. RADIOLOGY:    All available imaging studies/reports in [facility] for this admission were reviewed      Disclaimer: Sections of this note are dictated utilizing voice recognition software, which may have resulted in some phonetic based errors in grammar and contents. Even though attempts were made to correct all the mistakes, some may have been missed, and remained in the body of the document. If questions arise, please contact our department.     Dr. Alvaro Mcdermott, Infectious Disease Specialist  [phone]  January 12, 2022  9:00 AM

## 2022-01-12 NOTE — PROGRESS NOTES
In Patient Progress note      Admit Date: 1/9/2022    Impression:     #1 acute kidney injury, baseline creatinine 2019 was 1.5, no labs available since then. Suspect prerenal azotemia  Renal functions at baseline, CT abd with no hydro   #2. Acute hypoxic respiratory failure secondary to COVID-19 pneumonia  #3 COVID-19 pneumonia  #4 hypertension  #5 type 2 diabetes  #6 elevated troponins  #7 transaminitis  #8 hypoalbuminemia  #9 echocardiogram with normal EF but concentric hypertrophy likely from hypertension     Plan:     #1 strict ins and outs, daily weights  #2 c/c IVF and encourage po intake   #3 wean O2 as tolerated  #4 follow ID recommendations  #5 avoid IV contrast nephrotoxins  #6 renally dose medications for EGFR of less than 30     Please call with questions,     Nico Brower MD [facility]  Cell [phone]  Pager: [phone]    Subjective:     - No acute over night events. - respiratory - stable  - hemodynamics - stable, no pressrs  - UOP-ok  - Nutrition -ok    Objective:     Visit Vitals  /73   Pulse 97   Temp 98.4 °F (36.9 °C)   Resp 30   Ht 5' 11\" (1.803 m)   Wt 108.9 kg (240 lb)   SpO2 95%   BMI 33.47 kg/m²         Intake/Output Summary (Last 24 hours) at 1/12/2022 1141  Last data filed at 1/11/2022 1854  Gross per 24 hour   Intake 641.25 ml   Output 1400 ml   Net -758.75 ml       Physical Exam:     HFNC   No distress   HEENT: mmm  Lungs: alicja coarse BS   Cardiovascular system: S1, S2, regular rate and rhythm. Abdomen: soft, non tender, non distended. Extremities: no edema   Integumentary: skin is grossly intact. Neurologic: Alert, oriented time three.     Data Review:    Recent Labs     01/12/22 0432   WBC 9.4   RBC 4.15*   HCT 36.8   MCV 88.7   MCH 28.0   MCHC 31.5   RDW 14.2     Recent Labs     01/12/22  0432 01/11/22 0456 01/09/22 2034   BUN 46* 52* 43*   CREA 1.67* 2.24* 2.49*   CA 8.6 8.5 8.2*   ALB 2.3* 2.5* 2.8*   K 4.2 4.4 5.0    142 138   * 107 104   CO2 22 26 24 * 363* 312*       Gomez Delgado MD

## 2022-01-12 NOTE — ED NOTES
RN entered room to find that pt had self removed all of his monitoring cables, both IVs, and his oxygen and was sitting in stool. RN cleaned pt, placed new IV, replaced O2 and monitoring cords.

## 2022-01-12 NOTE — ED NOTES
Throughout night pt removing high flow nasal cannula, educated on importance of keeping cannula on to over low Oxygen levels, no evidence of learning noted, MD and charge nurse aware, will continue to re-direct and reapply cannula when needed, no other changes, will continue to monitor pt.

## 2022-01-12 NOTE — PROGRESS NOTES
completed the initial Spiritual Assessment of the patient in bed 3 of the emergency room and offered Pastoral Care  Support to the patient  Who has now been here for near 65 hours with a diagnosis of Covid positive. There is no advance directive. Patient does not have any Adventist/cultural needs that will affect patients preferences in health care. Chaplains will continue to follow and will provide pastoral care on an as needed/requested basis.     [facility] Care Department  [phone]

## 2022-01-12 NOTE — ED NOTES
Pt had large BM, cleaned and dried at this time, moderate size pressure ulcer noted to sacrum. MD notified, new order for wound care consult, no other changes, will continue to monitor pt.

## 2022-01-13 LAB
ALBUMIN SERPL-MCNC: 2.6 G/DL (ref 3.4–5)
ALBUMIN/GLOB SERPL: 0.5 {RATIO} (ref 0.8–1.7)
ALP SERPL-CCNC: 58 U/L (ref 45–117)
ALT SERPL-CCNC: 84 U/L (ref 16–61)
ANION GAP SERPL CALC-SCNC: 6 MMOL/L (ref 3–18)
APTT PPP: 122.9 SEC (ref 23–36.4)
APTT PPP: 138.6 SEC (ref 23–36.4)
APTT PPP: 41 SEC (ref 23–36.4)
AST SERPL-CCNC: 43 U/L (ref 10–38)
BASOPHILS # BLD: 0 K/UL (ref 0–0.1)
BASOPHILS NFR BLD: 0 % (ref 0–2)
BILIRUB SERPL-MCNC: 0.9 MG/DL (ref 0.2–1)
BUN SERPL-MCNC: 45 MG/DL (ref 7–18)
BUN/CREAT SERPL: 24 (ref 12–20)
CALCIUM SERPL-MCNC: 9.2 MG/DL (ref 8.5–10.1)
CHLORIDE SERPL-SCNC: 111 MMOL/L (ref 100–111)
CO2 SERPL-SCNC: 26 MMOL/L (ref 21–32)
CREAT SERPL-MCNC: 1.86 MG/DL (ref 0.6–1.3)
CRP SERPL-MCNC: 2.9 MG/DL (ref 0–0.3)
DIFFERENTIAL METHOD BLD: ABNORMAL
EOSINOPHIL # BLD: 0 K/UL (ref 0–0.4)
EOSINOPHIL NFR BLD: 0 % (ref 0–5)
ERYTHROCYTE [DISTWIDTH] IN BLOOD BY AUTOMATED COUNT: 14.1 % (ref 11.6–14.5)
GLOBULIN SER CALC-MCNC: 5.2 G/DL (ref 2–4)
GLUCOSE BLD STRIP.AUTO-MCNC: 305 MG/DL (ref 70–110)
GLUCOSE BLD STRIP.AUTO-MCNC: 376 MG/DL (ref 70–110)
GLUCOSE SERPL-MCNC: 211 MG/DL (ref 74–99)
HCT VFR BLD AUTO: 41.2 % (ref 36–48)
HGB BLD-MCNC: 13.2 G/DL (ref 13–16)
IMM GRANULOCYTES # BLD AUTO: 0.3 K/UL (ref 0–0.04)
IMM GRANULOCYTES NFR BLD AUTO: 3 % (ref 0–0.5)
LYMPHOCYTES # BLD: 0.7 K/UL (ref 0.9–3.6)
LYMPHOCYTES NFR BLD: 5 % (ref 21–52)
MCH RBC QN AUTO: 27.8 PG (ref 24–34)
MCHC RBC AUTO-ENTMCNC: 32 G/DL (ref 31–37)
MCV RBC AUTO: 86.7 FL (ref 78–100)
MONOCYTES # BLD: 0.4 K/UL (ref 0.05–1.2)
MONOCYTES NFR BLD: 4 % (ref 3–10)
MYOGLOBIN UR QL: POSITIVE
MYOGLOBIN UR-MCNC: 10 NG/ML (ref 0–13)
NEUTS SEG # BLD: 10.8 K/UL (ref 1.8–8)
NEUTS SEG NFR BLD: 88 % (ref 40–73)
NRBC # BLD: 0 K/UL (ref 0–0.01)
NRBC BLD-RTO: 0 PER 100 WBC
PLATELET # BLD AUTO: 211 K/UL (ref 135–420)
PMV BLD AUTO: 12.8 FL (ref 9.2–11.8)
POTASSIUM SERPL-SCNC: 4.5 MMOL/L (ref 3.5–5.5)
PROT SERPL-MCNC: 7.8 G/DL (ref 6.4–8.2)
RBC # BLD AUTO: 4.75 M/UL (ref 4.35–5.65)
SODIUM SERPL-SCNC: 143 MMOL/L (ref 136–145)
WBC # BLD AUTO: 12.3 K/UL (ref 4.6–13.2)

## 2022-01-13 PROCEDURE — 74011250637 HC RX REV CODE- 250/637: Performed by: INTERNAL MEDICINE

## 2022-01-13 PROCEDURE — 82962 GLUCOSE BLOOD TEST: CPT

## 2022-01-13 PROCEDURE — 85730 THROMBOPLASTIN TIME PARTIAL: CPT

## 2022-01-13 PROCEDURE — 86140 C-REACTIVE PROTEIN: CPT

## 2022-01-13 PROCEDURE — 85025 COMPLETE CBC W/AUTO DIFF WBC: CPT

## 2022-01-13 PROCEDURE — 94640 AIRWAY INHALATION TREATMENT: CPT

## 2022-01-13 PROCEDURE — 80053 COMPREHEN METABOLIC PANEL: CPT

## 2022-01-13 PROCEDURE — 74011250637 HC RX REV CODE- 250/637: Performed by: FAMILY MEDICINE

## 2022-01-13 PROCEDURE — 74011250636 HC RX REV CODE- 250/636: Performed by: INTERNAL MEDICINE

## 2022-01-13 PROCEDURE — 74011636637 HC RX REV CODE- 636/637: Performed by: FAMILY MEDICINE

## 2022-01-13 PROCEDURE — 92610 EVALUATE SWALLOWING FUNCTION: CPT

## 2022-01-13 PROCEDURE — 77010033711 HC HIGH FLOW OXYGEN

## 2022-01-13 PROCEDURE — 99232 SBSQ HOSP IP/OBS MODERATE 35: CPT | Performed by: FAMILY MEDICINE

## 2022-01-13 PROCEDURE — 74011000250 HC RX REV CODE- 250: Performed by: HOSPITALIST

## 2022-01-13 PROCEDURE — 65660000004 HC RM CVT STEPDOWN

## 2022-01-13 PROCEDURE — 74011636637 HC RX REV CODE- 636/637: Performed by: INTERNAL MEDICINE

## 2022-01-13 RX ORDER — DEXAMETHASONE SODIUM PHOSPHATE 4 MG/ML
10 INJECTION, SOLUTION INTRA-ARTICULAR; INTRALESIONAL; INTRAMUSCULAR; INTRAVENOUS; SOFT TISSUE EVERY 24 HOURS
Status: COMPLETED | OUTPATIENT
Start: 2022-01-15 | End: 2022-01-19

## 2022-01-13 RX ADMIN — SODIUM CHLORIDE, PRESERVATIVE FREE 10 ML: 5 INJECTION INTRAVENOUS at 14:00

## 2022-01-13 RX ADMIN — Medication 5000 UNITS: at 09:00

## 2022-01-13 RX ADMIN — IPRATROPIUM BROMIDE AND ALBUTEROL SULFATE 3 ML: .5; 3 SOLUTION RESPIRATORY (INHALATION) at 20:41

## 2022-01-13 RX ADMIN — Medication 30 UNITS: at 10:05

## 2022-01-13 RX ADMIN — Medication 15 UNITS: at 11:30

## 2022-01-13 RX ADMIN — Medication 12 UNITS: at 20:48

## 2022-01-13 RX ADMIN — Medication 6 UNITS: at 07:30

## 2022-01-13 RX ADMIN — SODIUM CHLORIDE, PRESERVATIVE FREE 10 ML: 5 INJECTION INTRAVENOUS at 20:41

## 2022-01-13 RX ADMIN — IPRATROPIUM BROMIDE AND ALBUTEROL SULFATE 3 ML: .5; 3 SOLUTION RESPIRATORY (INHALATION) at 10:04

## 2022-01-13 RX ADMIN — IPRATROPIUM BROMIDE AND ALBUTEROL SULFATE 3 ML: .5; 3 SOLUTION RESPIRATORY (INHALATION) at 12:00

## 2022-01-13 RX ADMIN — DEXAMETHASONE SODIUM PHOSPHATE 10 MG: 4 INJECTION, SOLUTION INTRAMUSCULAR; INTRAVENOUS at 20:39

## 2022-01-13 RX ADMIN — DEXAMETHASONE SODIUM PHOSPHATE 10 MG: 4 INJECTION, SOLUTION INTRAMUSCULAR; INTRAVENOUS at 10:04

## 2022-01-13 RX ADMIN — Medication 5 MG: at 20:43

## 2022-01-13 RX ADMIN — IPRATROPIUM BROMIDE AND ALBUTEROL SULFATE 3 ML: .5; 3 SOLUTION RESPIRATORY (INHALATION) at 04:08

## 2022-01-13 RX ADMIN — RIVAROXABAN 15 MG: 15 TABLET, FILM COATED ORAL at 20:42

## 2022-01-13 RX ADMIN — FAMOTIDINE 20 MG: 20 TABLET ORAL at 10:05

## 2022-01-13 NOTE — ED NOTES
Bedside and verbal shift report given by [facility], RN (off going nurse) to Torie Benavides RN (oncoming nurse). Report included the following information SBAR and ED Summary. Patient is awake in bed taking off high flow.

## 2022-01-13 NOTE — PROGRESS NOTES
[facility] Hospitalists  Progress Note    Patient: Michael Butt Age: 46 y.o. : 1970 MR#: [id number] SSN: [id number]  Date: 2022     Subjective/24-hour events:     Remains roomed in ED. On high flow nasal cannula with stable sats. Denies shortness of breath, no acute breathing difficulty. Assessment:   COVID-19 infection with COVID-19 pneumonia  Acute hypoxic respiratory failure  Acute, bilateral LE DVTs  Elevated troponin, doubt ACS  Elevated d dimer  MONICA  Hypertension with hypertensive heart disease  DM2  Class I obesity, BMI 33.47    Plan:   Supplemental oxygen, wean as tolerated. Remains on high flow for now. Renal issues per nephrology. Discussed with , continued assistance appreciated. Glycemic management as ordered, diabetes nurse following. Continue Lantus/SSI as ordered, adjust as necessary to optimize glycemic control especially in the setting of steroid administration. PVL results reviewed - bilateral LE DVT noted on lower extremity venous PVL. Continue IV heparin per protocol. Off of antibiotics per ID recommendations. Monitor. Continue to follow inflammatory markers. Supportive care o/w. Follow. Case discussed with:  [x]Patient  []Family  [x]Nursing  []Case Management  DVT Prophylaxis:  []Lovenox  []Hep SQ  []SCDs  []Coumadin   []On Heparin gtt    Objective:   VS:   Visit Vitals  /89   Pulse 89   Temp 98.4 °F (36.9 °C)   Resp 27   Ht 5' 11\" (1.803 m)   Wt 108.9 kg (240 lb)   SpO2 95%   BMI 33.47 kg/m²        General:  In NAD. Nontoxic-appearing. Cardiovascular:  RRR. Pulmonary:  Lungs clear bilaterally, no wheezes. No accessory muscle use. GI:  Abdomen soft, NTTP. Extremities:  Warm, no edema or ischemia. Neuro:  Awake and alert.   Moves extremities spontaneously, motor grossly nonfocal.    Labs:    Recent Results (from the past 24 hour(s))   GLUCOSE, POC    Collection Time: 22 10:00 PM   Result Value Ref Range Glucose (POC) 313 (H) 70 - 110 mg/dL   URINALYSIS W/MICROSCOPIC    Collection Time: 01/12/22 12:00 AM   Result Value Ref Range    Color YELLOW      Appearance CLEAR      Specific gravity 1.026 1.005 - 1.030      pH (UA) 5.0 5.0 - 8.0      Protein 30 (A) NEG mg/dL    Glucose >1,000 (A) NEG mg/dL    Ketone Negative NEG mg/dL    Bilirubin Negative NEG      Blood MODERATE (A) NEG      Urobilinogen 1.0 0.2 - 1.0 EU/dL    Nitrites Negative NEG      Leukocyte Esterase Negative NEG      RBC 0 to 1 0 - 5 /hpf    Epithelial cells 1+ 0 - 5 /lpf    Hyaline cast 0 to 1 0 - 2 /lpf    Granular cast 0 to 1 NEG /lpf   SODIUM, UR, RANDOM    Collection Time: 01/12/22 12:00 AM   Result Value Ref Range    Sodium,urine random 84 20 - 110 MMOL/L   CREATININE, UR, RANDOM    Collection Time: 01/12/22 12:00 AM   Result Value Ref Range    Creatinine, urine 85.00 30 - 125 mg/dL   MYOGLOBIN, UR, SCREEN    Collection Time: 01/12/22 12:00 AM   Result Value Ref Range    Myoglobin,urine screen Positive (A) NEG     PTT    Collection Time: 01/12/22 12:40 AM   Result Value Ref Range    aPTT 57.6 (H) 23.0 - 22.2 SEC   METABOLIC PANEL, COMPREHENSIVE    Collection Time: 01/12/22  4:32 AM   Result Value Ref Range    Sodium 141 136 - 145 mmol/L    Potassium 4.2 3.5 - 5.5 mmol/L    Chloride 112 (H) 100 - 111 mmol/L    CO2 22 21 - 32 mmol/L    Anion gap 7 3.0 - 18 mmol/L    Glucose 261 (H) 74 - 99 mg/dL    BUN 46 (H) 7.0 - 18 MG/DL    Creatinine 1.67 (H) 0.6 - 1.3 MG/DL    BUN/Creatinine ratio 28 (H) 12 - 20      GFR est AA 53 (L) >60 ml/min/1.73m2    GFR est non-AA 44 (L) >60 ml/min/1.73m2    Calcium 8.6 8.5 - 10.1 MG/DL    Bilirubin, total 0.7 0.2 - 1.0 MG/DL    ALT (SGPT) 79 (H) 16 - 61 U/L    AST (SGOT) 45 (H) 10 - 38 U/L    Alk.  phosphatase 53 45 - 117 U/L    Protein, total 7.0 6.4 - 8.2 g/dL    Albumin 2.3 (L) 3.4 - 5.0 g/dL    Globulin 4.7 (H) 2.0 - 4.0 g/dL    A-G Ratio 0.5 (L) 0.8 - 1.7     CBC WITH AUTOMATED DIFF    Collection Time: 01/12/22  4:32 AM   Result Value Ref Range    WBC 9.4 4.6 - 13.2 K/uL    RBC 4.15 (L) 4.35 - 5.65 M/uL    HGB 11.6 (L) 13.0 - 16.0 g/dL    HCT 36.8 36.0 - 48.0 %    MCV 88.7 78.0 - 100.0 FL    MCH 28.0 24.0 - 34.0 PG    MCHC 31.5 31.0 - 37.0 g/dL    RDW 14.2 11.6 - 14.5 %    PLATELET 984 899 - 064 K/uL    MPV 13.2 (H) 9.2 - 11.8 FL    NRBC 0.0 0  WBC    ABSOLUTE NRBC 0.00 0.00 - 0.01 K/uL    NEUTROPHILS 90 (H) 40 - 73 %    LYMPHOCYTES 5 (L) 21 - 52 %    MONOCYTES 3 3 - 10 %    EOSINOPHILS 0 0 - 5 %    BASOPHILS 0 0 - 2 %    IMMATURE GRANULOCYTES 2 (H) 0.0 - 0.5 %    ABS. NEUTROPHILS 8.4 (H) 1.8 - 8.0 K/UL    ABS. LYMPHOCYTES 0.5 (L) 0.9 - 3.6 K/UL    ABS. MONOCYTES 0.3 0.05 - 1.2 K/UL    ABS. EOSINOPHILS 0.0 0.0 - 0.4 K/UL    ABS. BASOPHILS 0.0 0.0 - 0.1 K/UL    ABS. IMM.  GRANS. 0.2 (H) 0.00 - 0.04 K/UL    DF AUTOMATED     C REACTIVE PROTEIN, QT    Collection Time: 01/12/22  4:32 AM   Result Value Ref Range    C-Reactive protein 6.0 (H) 0 - 0.3 mg/dL   PTT    Collection Time: 01/12/22  4:32 AM   Result Value Ref Range    aPTT 141.1 (H) 23.0 - 36.4 SEC   GLUCOSE, POC    Collection Time: 01/12/22  7:52 AM   Result Value Ref Range    Glucose (POC) 221 (H) 70 - 110 mg/dL   PROCALCITONIN    Collection Time: 01/12/22  8:30 AM   Result Value Ref Range    Procalcitonin 0.59 ng/mL   GLUCOSE, POC    Collection Time: 01/12/22 12:18 PM   Result Value Ref Range    Glucose (POC) 343 (H) 70 - 110 mg/dL   PTT    Collection Time: 01/12/22  2:58 PM   Result Value Ref Range    aPTT 54.3 (H) 23.0 - 36.4 SEC   GLUCOSE, POC    Collection Time: 01/12/22  6:34 PM   Result Value Ref Range    Glucose (POC) 289 (H) 70 - 110 mg/dL       Signed By: Cherry Childress MD     January 12, 2022

## 2022-01-13 NOTE — WOUND CARE
Physical Exam   Room ED03: Focused wound assess  Gluteal cleft, stage 2 pressure injury, 6x3x0.1cm, pink base, POA. Pt moves well side to side with assistance due to body habitus. Covered with barrier cream, silicone dressing, & is appropriate. Orders updated on chart. Wound Care Orders Gluteal cleft: Unit nursing staff to cleanse wound with wound spray from par room, apply Opticell AG gelling fiber dressing, rope/ribbon or 4x5 rectangle, cut dressing to fit, cover with Optifoam Gentle Silicone dressing, change every 48hrs & prn soilage. Topical prevention & treatment orders per nursing unit skin care protocol. Will turn over care to nursing staff at this time.   Tawnya Fam BSN, RN, [facility], [address]

## 2022-01-13 NOTE — PROGRESS NOTES
[facility] Hospitalists  Progress Note    Patient: Jesus Contreras Age: 46 y.o. : 1970 MR#: [id number] SSN: [id number]  Date: 2022     Subjective/24-hour events:     Remains in ED. Placed in restraints overnight due to patient pulling at IV site and taking oxygen off. No complaints of feeling short of breath currently, denies pain. Remains afebrile. Assessment:   COVID-19 infection with COVID-19 pneumonia  Acute hypoxic respiratory failure  Acute, bilateral LE DVTs  Elevated troponin, doubt ACS  Elevated d dimer  MONICA  Hypertension with hypertensive heart disease  DM2  Class I obesity, BMI 33.47    Plan:   Continue supplemental oxygen as needed to maintain adequate saturations. Wean as tolerated. Continue Decadron. Lantus/SSI, adjust as necessary to optimize glycemic control. Diabetes nurse following. Assistance appreciated. DC IV heparin, transition to oral anticoagulation therapy. Continue to monitor off antibiotics. Stable. Continue soft wrist restraints, remove as soon as able/when patient less confused. Supportive care otherwise. I have reevaluated the patient after initiation of intervention. The patient is comfortable, uninjured, but continues to pose an imminent risk of injury to self and or serious disruption of treatment. The patient's current medical and behavioral conditions that warrant the use intervention include danger to self and Interference with medical treatment. Restraint or seclusion will be discontinued at the earliest possible time, regardless of the scheduled expiration of the order. Case discussed with:  [x]Patient  []Family  [x]Nursing  []Case Management  DVT Prophylaxis:  []Lovenox  []Hep SQ  []SCDs  []Coumadin   []On Heparin gtt    Objective:   VS:   Visit Vitals  BP (!) 138/91   Pulse 93   Temp 98.4 °F (36.9 °C)   Resp 22   Ht 5' 11\" (1.803 m)   Wt 108.9 kg (240 lb)   SpO2 96%   BMI 33.47 kg/m²        General:  In NAD. Nontoxic-appearing. Cardiovascular:  RRR. Pulmonary:  Lungs clear bilaterally, no wheezes. No accessory muscle use. GI:  Abdomen soft, NTTP. Extremities:  Warm, no edema or ischemia. Neuro: Awake, moves extremities spontaneously    Labs:    Recent Results (from the past 24 hour(s))   GLUCOSE, POC    Collection Time: 01/12/22  6:34 PM   Result Value Ref Range    Glucose (POC) 289 (H) 70 - 110 mg/dL   GLUCOSE, POC    Collection Time: 01/12/22 11:37 PM   Result Value Ref Range    Glucose (POC) 261 (H) 70 - 110 mg/dL   PTT    Collection Time: 01/13/22 12:25 AM   Result Value Ref Range    aPTT 138.6 (H) 23.0 - 97.9 SEC   METABOLIC PANEL, COMPREHENSIVE    Collection Time: 01/13/22  3:50 AM   Result Value Ref Range    Sodium 143 136 - 145 mmol/L    Potassium 4.5 3.5 - 5.5 mmol/L    Chloride 111 100 - 111 mmol/L    CO2 26 21 - 32 mmol/L    Anion gap 6 3.0 - 18 mmol/L    Glucose 211 (H) 74 - 99 mg/dL    BUN 45 (H) 7.0 - 18 MG/DL    Creatinine 1.86 (H) 0.6 - 1.3 MG/DL    BUN/Creatinine ratio 24 (H) 12 - 20      GFR est AA 47 (L) >60 ml/min/1.73m2    GFR est non-AA 38 (L) >60 ml/min/1.73m2    Calcium 9.2 8.5 - 10.1 MG/DL    Bilirubin, total 0.9 0.2 - 1.0 MG/DL    ALT (SGPT) 84 (H) 16 - 61 U/L    AST (SGOT) 43 (H) 10 - 38 U/L    Alk.  phosphatase 58 45 - 117 U/L    Protein, total 7.8 6.4 - 8.2 g/dL    Albumin 2.6 (L) 3.4 - 5.0 g/dL    Globulin 5.2 (H) 2.0 - 4.0 g/dL    A-G Ratio 0.5 (L) 0.8 - 1.7     CBC WITH AUTOMATED DIFF    Collection Time: 01/13/22  3:50 AM   Result Value Ref Range    WBC 12.3 4.6 - 13.2 K/uL    RBC 4.75 4.35 - 5.65 M/uL    HGB 13.2 13.0 - 16.0 g/dL    HCT 41.2 36.0 - 48.0 %    MCV 86.7 78.0 - 100.0 FL    MCH 27.8 24.0 - 34.0 PG    MCHC 32.0 31.0 - 37.0 g/dL    RDW 14.1 11.6 - 14.5 %    PLATELET 676 916 - 148 K/uL    MPV 12.8 (H) 9.2 - 11.8 FL    NRBC 0.0 0  WBC    ABSOLUTE NRBC 0.00 0.00 - 0.01 K/uL    NEUTROPHILS 88 (H) 40 - 73 %    LYMPHOCYTES 5 (L) 21 - 52 %    MONOCYTES 4 3 - 10 % EOSINOPHILS 0 0 - 5 %    BASOPHILS 0 0 - 2 %    IMMATURE GRANULOCYTES 3 (H) 0.0 - 0.5 %    ABS. NEUTROPHILS 10.8 (H) 1.8 - 8.0 K/UL    ABS. LYMPHOCYTES 0.7 (L) 0.9 - 3.6 K/UL    ABS. MONOCYTES 0.4 0.05 - 1.2 K/UL    ABS. EOSINOPHILS 0.0 0.0 - 0.4 K/UL    ABS. BASOPHILS 0.0 0.0 - 0.1 K/UL    ABS. IMM.  GRANS. 0.3 (H) 0.00 - 0.04 K/UL    DF AUTOMATED     C REACTIVE PROTEIN, QT    Collection Time: 01/13/22  3:50 AM   Result Value Ref Range    C-Reactive protein 2.9 (H) 0 - 0.3 mg/dL   PTT    Collection Time: 01/13/22  3:50 AM   Result Value Ref Range    aPTT 41.0 (H) 23.0 - 36.4 SEC   GLUCOSE, POC    Collection Time: 01/13/22 10:16 AM   Result Value Ref Range    Glucose (POC) 376 (H) 70 - 110 mg/dL   PTT    Collection Time: 01/13/22 10:25 AM   Result Value Ref Range    aPTT 122.9 (H) 23.0 - 36.4 SEC       Signed By: Michelle Prasad MD     January 13, 2022

## 2022-01-13 NOTE — ED NOTES
Bedside and verbal shift report given by Christopher Fenton RN (off going nurse) to Thalia Nur RN (oncoming nurse). Report included the following information SBAR and ED Summary.

## 2022-01-13 NOTE — PROGRESS NOTES
Problem: Dysphagia (Adult)  Goal: *Acute Goals and Plan of Care (Insert Text)  Description: Patient will:  1. Tolerate PO trials with 0 s/s overt distress in 4/5 trials-met  2. Participate in training and education related to continued aspiration risk, diet recs and compensatory strategies-met    Recommend:   Regular diet with thin liquids  Meds as tolerated   Aspiration precautions  HOB >45 degrees during all intake and for at least 30 min after po   Small bites/sips, Slow rate of intake     Outcome: Resolved/Met    Speech LAnguage Pathology bedside swallow evaluation AND DISCHARGE    Patient: Susie Graham (75 y.o. male)  Date: 1/13/2022  Primary Diagnosis: COVID [U07.1]  Pneumonia due to COVID-19 virus [U07.1, J12.82]  Precautions: Aspiration       ASSESSMENT :  Clinical beside swallow eval completed per MD orders. Pt alert but confused, requiring max encouragement for po. Speech within functional limits. Decreased vocal intensity noted. HFNC in place. Oral mech examination revealed structures functional for speech and deglutition. Pt observed with thin liquids +/- straw via single sips and successive swallows with timely swallow initiation, adequate laryngeal elevation to palpation and no overt s/sx aspiration. Pt demo positive rotary chew and thorough oral clearance with regular solids with no overt s/sx aspiration. Pt safe for regular diet with thin liquids, meds as tolerated with general safe swallow precautions. Pt educated with regard to s/sx aspiration, aspiration risk, diet recs and role of SLP. Pt able to verbalize understanding. Will sign off. Please re-consult as indicated. D/w RN. PLAN :  Recommendations and Planned Interventions:  No formal ST needs ID'd for dysphagia. Eval only.    Discharge Recommendations: None     SUBJECTIVE:   Patient stated I don't want it    OBJECTIVE:     Past Medical History:   Diagnosis Date    CVA (cerebral vascular accident) ([facility] [address]     right-sided deficit Diabetes ([facility] [address]     HTN (hypertension)      Past Surgical History:   Procedure Laterality Date    HX HIP FRACTURE TX      left    HX ORTHOPAEDIC      hip     Prior Level of Function/Home Situation: Home  Diet prior to admission: Unknown   Current Diet:  Regular/thin liquids    Cognitive and Communication Status:  Neurologic State: Alert  Orientation Level: Disoriented to place,Disoriented to situation  Cognition: Follows commands,Poor safety awareness  Oral Assessment:  Oral Assessment  Labial: No impairment  Dentition: Natural  Oral Hygiene: Good  Lingual: No impairment  Velum: No impairment  Mandible: No impairment  P.O. Trials:  Patient Position: 45 at [facility]  Vocal quality prior to P.O.: Low volume  Consistency Presented: Thin liquid; Solid  How Presented: Self-fed/presented;Cup/sip;Straw;Successive swallows  Bolus Acceptance: No impairment  Bolus Formation/Control: No impairment  Propulsion: No impairment  Oral Residue: None  Initiation of Swallow: No impairment  Laryngeal Elevation: Functional  Aspiration Signs/Symptoms: None  Pharyngeal Phase Characteristics: Poor endurance  Effective Modifications:  (Slow rate of intake)  Cues for Modifications: Moderate  Oral Phase Severity: No impairment  Pharyngeal Phase Severity : Minimal    The severity rating is based on the following outcomes:    Clinical judgment    Pain:  Not reported prior to or following evaluation     After treatment:   []            Patient left in no apparent distress sitting up in chair  [x]            Patient left in no apparent distress in bed  [x]            Call bell left within reach  []            Nursing notified  []            Caregiver present  []            Bed alarm activated    COMMUNICATION/EDUCATION:   [x]            SLP educated pt with regard to aspiration s/sx and to alert MD/RN if symptoms arise.      Thank you for this referral,   KIT BarbozaS., [id number] [facility]  Speech-Language Pathologist

## 2022-01-13 NOTE — PROGRESS NOTES
01/13/22 0838   Oxygen Therapy   O2 Sat (%) 92 %   Pulse via Oximetry 83 beats per minute   O2 Device Hi flow nasal cannula; Heated   O2 Flow Rate (L/min) 25 l/min   O2 Temperature 91.4 °F (33 °C)   FIO2 (%) 85 %

## 2022-01-13 NOTE — PROGRESS NOTES
In Patient Progress note    Admit Date: 1/9/2022    Impression:     #1 Acute kidney injury, baseline creatinine 2019 was 1.5, no labs available since then. --> improved , d/t prerenal azotemia Renal functions close to baseline, CT abd with no hydro   #2. Acute hypoxic respiratory failure secondary to COVID-19 pneumonia  #3 COVID-19 pneumonia  #4 hypertension  #5 type 2 diabetes  #6 elevated troponins  #7 transaminitis  #8 hypoalbuminemia  #9 echocardiogram with normal EF but concentric hypertrophy likely from hypertension     Plan:     #1 strict ins and outs, daily weights  #2 D/c IVF and encourage po intake   #3 wean O2 as tolerated  #4 follow ID recommendations  #5 avoid IV contrast nephrotoxins  #6 renally dose medications for EGFR of less than 30     Please call with questions,     Deepa Fajardo MD [facility]  Cell [phone]  Pager: [phone]      Subjective:     - No acute over night events. - respiratory - HFNC  - hemodynamics - stable, no pressrs  - UOP-better  - Nutrition -poor     Objective:     Visit Vitals  BP (!) 138/91   Pulse 93   Temp 98.4 °F (36.9 °C)   Resp 22   Ht 5' 11\" (1.803 m)   Wt 108.9 kg (240 lb)   SpO2 96%   BMI 33.47 kg/m²       No intake or output data in the 24 hours ending 01/13/22 1532    Physical Exam:          HFNC   Mild distress   HEENT: mmm  Lungs: alicja coarse BS   Cardiovascular system: S1, S2, regular rate and rhythm. Abdomen: soft, non tender, non distended. Extremities: no edema   Integumentary: skin is grossly intact. Neurologic: Alert, oriented time three.       Data Review:    Recent Labs     01/13/22  0350   WBC 12.3   RBC 4.75   HCT 41.2   MCV 86.7   MCH 27.8   MCHC 32.0   RDW 14.1     Recent Labs     01/13/22  0350 01/12/22  0432 01/11/22  0456   BUN 45* 46* 52*   CREA 1.86* 1.67* 2.24*   CA 9.2 8.6 8.5   ALB 2.6* 2.3* 2.5*   K 4.5 4.2 4.4    141 142    112* 107   CO2 26 22 26   * 261* 363*       Bekah Gavin, MD

## 2022-01-14 LAB
APTT PPP: 31.2 SEC (ref 23–36.4)
CRP SERPL-MCNC: 1.2 MG/DL (ref 0–0.3)
GLUCOSE BLD STRIP.AUTO-MCNC: 228 MG/DL (ref 70–110)
GLUCOSE BLD STRIP.AUTO-MCNC: 305 MG/DL (ref 70–110)

## 2022-01-14 PROCEDURE — 94640 AIRWAY INHALATION TREATMENT: CPT

## 2022-01-14 PROCEDURE — 86140 C-REACTIVE PROTEIN: CPT

## 2022-01-14 PROCEDURE — 74011000250 HC RX REV CODE- 250: Performed by: HOSPITALIST

## 2022-01-14 PROCEDURE — 74011636637 HC RX REV CODE- 636/637: Performed by: FAMILY MEDICINE

## 2022-01-14 PROCEDURE — 74011250637 HC RX REV CODE- 250/637: Performed by: INTERNAL MEDICINE

## 2022-01-14 PROCEDURE — 65660000004 HC RM CVT STEPDOWN

## 2022-01-14 PROCEDURE — 82962 GLUCOSE BLOOD TEST: CPT

## 2022-01-14 PROCEDURE — 36415 COLL VENOUS BLD VENIPUNCTURE: CPT

## 2022-01-14 PROCEDURE — 74011636637 HC RX REV CODE- 636/637: Performed by: INTERNAL MEDICINE

## 2022-01-14 PROCEDURE — 74011250636 HC RX REV CODE- 250/636: Performed by: INTERNAL MEDICINE

## 2022-01-14 PROCEDURE — 99232 SBSQ HOSP IP/OBS MODERATE 35: CPT | Performed by: FAMILY MEDICINE

## 2022-01-14 PROCEDURE — 85730 THROMBOPLASTIN TIME PARTIAL: CPT

## 2022-01-14 PROCEDURE — 74011250637 HC RX REV CODE- 250/637: Performed by: FAMILY MEDICINE

## 2022-01-14 RX ORDER — HYDRALAZINE HYDROCHLORIDE 20 MG/ML
10 INJECTION INTRAMUSCULAR; INTRAVENOUS
Status: DISCONTINUED | OUTPATIENT
Start: 2022-01-14 | End: 2022-02-04 | Stop reason: HOSPADM

## 2022-01-14 RX ORDER — INSULIN GLARGINE 100 [IU]/ML
35 INJECTION, SOLUTION SUBCUTANEOUS DAILY
Status: DISCONTINUED | OUTPATIENT
Start: 2022-01-15 | End: 2022-01-20

## 2022-01-14 RX ORDER — AMLODIPINE BESYLATE 10 MG/1
10 TABLET ORAL DAILY
Status: DISCONTINUED | OUTPATIENT
Start: 2022-01-14 | End: 2022-02-01

## 2022-01-14 RX ORDER — INSULIN GLARGINE 100 [IU]/ML
5 INJECTION, SOLUTION SUBCUTANEOUS ONCE
Status: COMPLETED | OUTPATIENT
Start: 2022-01-14 | End: 2022-01-14

## 2022-01-14 RX ADMIN — Medication 30 UNITS: at 08:20

## 2022-01-14 RX ADMIN — RIVAROXABAN 15 MG: 15 TABLET, FILM COATED ORAL at 08:20

## 2022-01-14 RX ADMIN — Medication 5000 UNITS: at 08:20

## 2022-01-14 RX ADMIN — Medication 5 MG: at 23:29

## 2022-01-14 RX ADMIN — SODIUM CHLORIDE, PRESERVATIVE FREE 10 ML: 5 INJECTION INTRAVENOUS at 06:00

## 2022-01-14 RX ADMIN — DEXAMETHASONE SODIUM PHOSPHATE 10 MG: 4 INJECTION, SOLUTION INTRAMUSCULAR; INTRAVENOUS at 08:20

## 2022-01-14 RX ADMIN — Medication 12 UNITS: at 18:37

## 2022-01-14 RX ADMIN — IPRATROPIUM BROMIDE AND ALBUTEROL SULFATE 3 ML: .5; 3 SOLUTION RESPIRATORY (INHALATION) at 23:00

## 2022-01-14 RX ADMIN — RIVAROXABAN 15 MG: 15 TABLET, FILM COATED ORAL at 18:37

## 2022-01-14 RX ADMIN — DEXAMETHASONE SODIUM PHOSPHATE 10 MG: 4 INJECTION, SOLUTION INTRAMUSCULAR; INTRAVENOUS at 23:28

## 2022-01-14 RX ADMIN — Medication 6 UNITS: at 08:19

## 2022-01-14 RX ADMIN — IPRATROPIUM BROMIDE AND ALBUTEROL SULFATE 3 ML: .5; 3 SOLUTION RESPIRATORY (INHALATION) at 08:25

## 2022-01-14 RX ADMIN — AMLODIPINE BESYLATE 10 MG: 10 TABLET ORAL at 18:37

## 2022-01-14 RX ADMIN — SODIUM CHLORIDE, PRESERVATIVE FREE 10 ML: 5 INJECTION INTRAVENOUS at 14:00

## 2022-01-14 RX ADMIN — FAMOTIDINE 20 MG: 20 TABLET ORAL at 08:20

## 2022-01-14 RX ADMIN — IPRATROPIUM BROMIDE AND ALBUTEROL SULFATE 3 ML: .5; 3 SOLUTION RESPIRATORY (INHALATION) at 18:13

## 2022-01-14 RX ADMIN — Medication 5 UNITS: at 18:38

## 2022-01-14 NOTE — PROGRESS NOTES
In Patient Progress note    Admit Date: 1/9/2022    Impression:     #1 Acute kidney injury, baseline creatinine 2019 was 1.5, no labs available since then. --> improved ,   d/t prerenal azotemia Renal functions close to baseline, CT abd with no hydro   #2. Acute hypoxic respiratory failure secondary to COVID-19 pneumonia  #3 COVID-19 pneumonia  #4 hypertension  #5 type 2 diabetes  #6 elevated troponins  #7 transaminitis  #8 hypoalbuminemia  #9 echocardiogram with normal EF but concentric hypertrophy likely from hypertension     Plan:     #1 strict ins and outs, daily weights  #2 encourage po intake , follow labs ( labs pending )   #3 wean O2 as tolerated  #4 follow ID recommendations  #5 avoid IV contrast nephrotoxins  #6 renally dose medications for EGFR of less than 30     Please call with questions,     Tatianna Fontanez MD FASN  Cell [phone]  Pager: [phone]      Subjective:     - No acute over night events. - respiratory - HFNC  - hemodynamics - stable, no pressrs  - UOP-better  - Nutrition -poor     Objective:     Visit Vitals  BP (!) 156/129   Pulse 71   Temp 98.2 °F (36.8 °C)   Resp 18   Ht 5' 11\" (1.803 m)   Wt 108.9 kg (240 lb)   SpO2 98%   BMI 33.47 kg/m²       No intake or output data in the 24 hours ending 01/14/22 1609    Physical Exam:          HFNC   Mild distress   HEENT: mmm  Lungs: alicja coarse BS   Cardiovascular system: S1, S2, regular rate and rhythm. Abdomen: soft, non tender, non distended. Extremities: no edema   Integumentary: skin is grossly intact. Neurologic: Alert, oriented time three.       Data Review:    Recent Labs     01/13/22  0350   WBC 12.3   RBC 4.75   HCT 41.2   MCV 86.7   MCH 27.8   MCHC 32.0   RDW 14.1     Recent Labs     01/13/22  0350 01/12/22  0432   BUN 45* 46*   CREA 1.86* 1.67*   CA 9.2 8.6   ALB 2.6* 2.3*   K 4.5 4.2    141    112*   CO2 26 22   * 261*       Raford Dakin, MD

## 2022-01-14 NOTE — ED NOTES
Bedside shift change report given to POLINA (oncoming nurse) by Giancarlo Merritt (offgoing nurse). Report included the following information SBAR, Kardex, ED Summary, Procedure Summary, Intake/Output, MAR, Recent Results, Alarm Parameters  and Quality Measures. Pt awake in bed in no acute distress with unlabored breathing.

## 2022-01-14 NOTE — ED NOTES
Pt with large incont of stool episode  Pt cleaned  Linen changed  Male purewick applied  Pt medicated per [facility]

## 2022-01-14 NOTE — ED NOTES
Patient continues to take his oxygen and monitor off. He is redirected et necessity of leaving things in place explained. Patient cleaned of large incontinent soft stool. Mepilex to sacral pressure ulcer is soiled- changed with cleaning. Linen and gown change done. Patient tolerated well.

## 2022-01-14 NOTE — ED NOTES
Pt keeps pulling off hi flow  Multiple times throughout day pt noted to be in 80s d/t high flow not being in place  MD paged to renew restraint order

## 2022-01-14 NOTE — ED NOTES
Patient found to have eaten approximately 100% of his night meal. He is oriented to self. Patient informed that he is in hospital at [place], et verbalizes understanding. Sinus rhythm to cardiac monitor. Respirations even and unlabored.

## 2022-01-14 NOTE — ED TRIAGE NOTES
New linens, yaw, brief, bedside hygiene provided. Male purewick placed. Wife called on speaker in pt room and updated with plan of care and recent results. Wife reports house renovations and wife moving into hotel for 2 weeks, request contact through mobile number.     Provider updated with pt home renovations for next two weeks

## 2022-01-14 NOTE — PROGRESS NOTES
Notified by nursing that patient continues to remove oxygen with saturations decreasing to the 70s with oxygen off. Will place order for bilateral wrist restraints in the attempt to ensure that oxygen remains in place. Nursing staff will continue to assess patient as frequently as able and this was discussed via phone. The patient's current medical and behavioral conditions that warrant the use intervention include danger to self and Interference with medical treatment. Restraint or seclusion will be discontinued at the earliest possible time, regardless of the scheduled expiration of the order.

## 2022-01-14 NOTE — PROGRESS NOTES
[facility] Hospitalists  Progress Note    Patient: Lynn Vargas Age: 46 y.o. : 1970 MR#: [id number] SSN: [id number]  Date: 2022     Subjective/24-hour events:     Patient remains roomed in ED. Sleeping on entry into room, denies feeling shortness of breath. Has some documented episodes of hypoxia with sats in the mid 80s overnight. No worsening in respiratory status overall, however. Assessment:   COVID-19 infection with COVID-19 pneumonia  Acute hypoxic respiratory failure  Acute, bilateral LE DVTs  Elevated troponin, doubt ACS  Elevated d dimer  MONICA  Hypertension with hypertensive heart disease  DM2  Class I obesity, BMI 33.47    Plan:   Remains on high flow nasal cannula. Continue as necessary to maintain sats, wean as able. Decadron as ordered. Lantus/SSI. Blood sugars running high, Lantus dose increased today. Diabetes nurse following. Resume amlodipine. Add PRN hydralazine with parameters, monitor BPs. IV heparin discontinued patient transition to Xarelto yesterday. Lantus/SSI, adjust as necessary to optimize glycemic control. Diabetes nurse following. Assistance appreciated. Continue supportive care otherwise. Following. Case discussed with:  [x]Patient  []Family  [x]Nursing  []Case Management  DVT Prophylaxis:  [x]Xarelto []Hep SQ  []SCDs  []Coumadin   []On Heparin gtt    Objective:   VS:   Visit Vitals  BP (!) 156/129   Pulse 71   Temp 98.2 °F (36.8 °C)   Resp 18   Ht 5' 11\" (1.803 m)   Wt 108.9 kg (240 lb)   SpO2 98%   BMI 33.47 kg/m²        General:  In NAD. Nontoxic-appearing. Cardiovascular:  RRR. Pulmonary:  Lungs clear bilaterally, no wheezes. No accessory muscle use. GI:  Abdomen soft, NTTP. Extremities:  Warm, no edema or ischemia.   Neuro: Awake, moves extremities spontaneously    Labs:    Recent Results (from the past 24 hour(s))   GLUCOSE, POC    Collection Time: 22  8:48 PM   Result Value Ref Range    Glucose (POC) 305 (H) 70 - 110 mg/dL   C REACTIVE PROTEIN, QT    Collection Time: 01/14/22  4:29 AM   Result Value Ref Range    C-Reactive protein 1.2 (H) 0 - 0.3 mg/dL   PTT    Collection Time: 01/14/22  4:29 AM   Result Value Ref Range    aPTT 31.2 23.0 - 36.4 SEC   GLUCOSE, POC    Collection Time: 01/14/22  8:11 AM   Result Value Ref Range    Glucose (POC) 228 (H) 70 - 110 mg/dL       Signed By: Kyle Norris MD     January 14, 2022

## 2022-01-14 NOTE — PROGRESS NOTES
Infectious Disease progress Note        Reason: Severe COVID-19 pneumonia    Current abx Prior abx   Ceftriaxone, azithromycin  1/10/2022-1/11/22      Lines:       Assessment :  46 y.o. male who has a history of hypertension, type 2 diabetes presented to the emergency room on 1/9/22 with shortness of breath. Clinical presentation consistent with acute hypoxic respiratory failure-present on admission due to confirmed COVID-19 pneumonia, severe    Mild elevated procalcitonin-could be due to acute kidney injury. Unable to exclude superimposed bacterial pneumonia with full certainty    Acute kidney injury-likely secondary to volume depletion. Monitor for COVID-19 associated nephropathy/ATN    Elevated [id number] due to  COVID-19 associated hypercoagulability. Evidence of DVT noted on venous duplex 1/10/22    Clinically better. Appeared comfortable on 25L high flow today. +nt confusion- ? Hypoxic encephalopathy versus steroids related    Recommendations:    1. Continue Decadron 10 mg IV every 12 hours till 1/14/22 followed by 10 mg iv q 24 hour till 1/19/22. 2.  Hold further abx   3. Therapeutic anticoagulation per primary team  4. Monitor crp, procalcitonin, oxygenation    Will sign off. F/u prn. thanks     Above plan was discussed in details with patient,RN. Please call me if any further questions or concerns. HPI:      Feels better. Denies any chest pain, abdominal pain, diarrhea/constipation. Past Surgical History:   Procedure Laterality Date    HX HIP FRACTURE TX      left    HX ORTHOPAEDIC      hip       Patient's Medications   Start Taking    No medications on file   Continue Taking    AMLODIPINE-OLMESARTAN 10-40 MG TAB    Take  by mouth. HYDROCODONE-ACETAMINOPHEN (NORCO) 5-325 MG PER TABLET    Take 1 Tab by mouth every four (4) hours as needed for Pain. METOPROLOL (LOPRESSOR) 50 MG TABLET    Take 1 Tab by mouth two (2) times a day.     SITAGLIPTIN-METFORMIN (JANUMET) 50-1,000 MG PER TABLET    Take 1 Tab by mouth two (2) times daily (with meals). These Medications have changed    No medications on file   Stop Taking    No medications on file       Current Facility-Administered Medications   Medication Dose Route Frequency    rivaroxaban (XARELTO) tablet 15 mg  15 mg Oral BID WITH MEALS    insulin glargine (LANTUS) injection 30 Units  30 Units SubCUTAneous DAILY    sodium chloride (NS) flush 5-40 mL  5-40 mL IntraVENous Q8H    sodium chloride (NS) flush 5-40 mL  5-40 mL IntraVENous PRN    acetaminophen (TYLENOL) tablet 650 mg  650 mg Oral Q6H PRN    Or    acetaminophen (TYLENOL) suppository 650 mg  650 mg Rectal Q6H PRN    polyethylene glycol (MIRALAX) packet 17 g  17 g Oral DAILY PRN    ondansetron (ZOFRAN ODT) tablet 4 mg  4 mg Oral Q8H PRN    Or    ondansetron (ZOFRAN) injection 4 mg  4 mg IntraVENous Q6H PRN    albuterol-ipratropium (DUO-NEB) 2.5 MG-0.5 MG/3 ML  3 mL Nebulization Q4H RT    insulin lispro (HUMALOG) injection   SubCUTAneous AC&HS    glucose chewable tablet 16 g  4 Tablet Oral PRN    glucagon (GLUCAGEN) injection 1 mg  1 mg IntraMUSCular PRN    dextrose (D50W) injection syrg 12.5-25 g  25-50 mL IntraVENous PRN    famotidine (PEPCID) tablet 20 mg  20 mg Oral DAILY    melatonin tablet 5 mg  5 mg Oral QHS    cholecalciferol (VITAMIN D3) capsule 5,000 Units  5,000 Units Oral DAILY    dexamethasone (DECADRON) 4 mg/mL injection 10 mg  10 mg IntraVENous Q12H     Current Outpatient Medications   Medication Sig    amLODIPine-Olmesartan 10-40 mg tab Take  by mouth.  SITagliptin-metFORMIN (JANUMET) 50-1,000 mg per tablet Take 1 Tab by mouth two (2) times daily (with meals).  metoprolol (LOPRESSOR) 50 mg tablet Take 1 Tab by mouth two (2) times a day.  HYDROcodone-acetaminophen (NORCO) 5-325 mg per tablet Take 1 Tab by mouth every four (4) hours as needed for Pain.        Allergies: Grape    Family History   Problem Relation Age of Onset    Stroke Neg Hx  Hypertension Neg Hx     Heart Disease Neg Hx     Diabetes Neg Hx     Cancer Neg Hx      Social History     Socioeconomic History    Marital status:      Spouse name: Not on file    Number of children: Not on file    Years of education: Not on file    Highest education level: Not on file   Occupational History    Not on file   Tobacco Use    Smoking status: Never Smoker    Smokeless tobacco: Never Used   Substance and Sexual Activity    Alcohol use: No    Drug use: No    Sexual activity: Not on file   Other Topics Concern    Not on file   Social History Narrative    Not on file     Social Determinants of Health     Financial Resource Strain:     Difficulty of Paying Living Expenses: Not on file   Food Insecurity:     Worried About Running Out of Food in the Last Year: Not on file    Michael of Food in the Last Year: Not on file   Transportation Needs:     Lack of Transportation (Medical): Not on file    Lack of Transportation (Non-Medical):  Not on file   Physical Activity:     Days of Exercise per Week: Not on file    Minutes of Exercise per Session: Not on file   Stress:     Feeling of Stress : Not on file   Social Connections:     Frequency of Communication with Friends and Family: Not on file    Frequency of Social Gatherings with Friends and Family: Not on file    Attends Jewish Services: Not on file    Active Member of [address] [place] [address] [employer] or Organizations: Not on file    Attends Club or Organization Meetings: Not on file    Marital Status: Not on file   Intimate Partner Violence:     Fear of Current or Ex-Partner: Not on file    Emotionally Abused: Not on file    Physically Abused: Not on file    Sexually Abused: Not on file   Housing Stability:     Unable to Pay for Housing in the Last Year: Not on file    Number of [place] in the Last Year: Not on file    Unstable Housing in the Last Year: Not on file     Social History     Tobacco Use   Smoking Status Never Smoker Smokeless Tobacco Never Used        No data recorded. Visit Vitals  /81   Pulse 93   Temp 98.4 °F (36.9 °C)   Resp 22   Ht 5' 11\" (1.803 m)   Wt 108.9 kg (240 lb)   SpO2 (!) 86%   BMI 33.47 kg/m²       ROS: 12 point ROS obtained in details. Pertinent positives as mentioned in HPI,   otherwise negative    Physical Exam:    Vitals signs and nursing note reviewed. Constitutional:       Sitting on bed, nonrebreather mask on face, in no apparent distress  HENT:      Head: Normocephalic. Eyes:      Conjunctiva/sclera: Conjunctivae normal.      Neck:      Musculoskeletal: Normal range of motion and neck supple. Cardiovascular:      Rate and Rhythm: Normal rate and regular rhythm on monitor  Chest:      Bilateral chest movements equal.  Auscultation deferred due to Covid positive  Abdominal:      General: There is no distension. Palpations: Abdomen is soft. Tenderness: There is no abdominal tenderness. There is no rebound. Musculoskeletal: Normal range of motion. General: No tenderness. Skin:     General: Skin is warm and dry. Findings: No rash. Neurological:      Mental Status: He is alert and oriented to person, place, and time. Cranial Nerves: No cranial nerve deficit. Motor: No abnormal muscle tone. Coordination: Coordination normal.   Psychiatric:         Behavior: Behavior normal.         Thought Content:  Thought content normal.         Judgment: Judgment normal.   Labs: Results:   Chemistry Recent Labs     01/13/22 0350 01/12/22 0432 01/11/22 0456   * 261* 363*    141 142   K 4.5 4.2 4.4    112* 107   CO2 26 22 26   BUN 45* 46* 52*   CREA 1.86* 1.67* 2.24*   CA 9.2 8.6 8.5   AGAP 6 7 9   BUCR 24* 28* 23*   AP 58 53 43*   TP 7.8 7.0 7.6   ALB 2.6* 2.3* 2.5*   GLOB 5.2* 4.7* 5.1*   AGRAT 0.5* 0.5* 0.5*      CBC w/Diff Recent Labs     01/13/22 0350 01/12/22 0432 01/11/22 0456   WBC 12.3 9.4 9.3   RBC 4.75 4.15* 4.63   HGB 13.2 11.6* 12.9*   HCT 41.2 36.8 39.9    160 135   GRANS 88* 90* 88*   LYMPH 5* 5* 6*   EOS 0 0 0      Microbiology No results for input(s): CULT in the last 72 hours. RADIOLOGY:    All available imaging studies/reports in [facility] for this admission were reviewed      Disclaimer: Sections of this note are dictated utilizing voice recognition software, which may have resulted in some phonetic based errors in grammar and contents. Even though attempts were made to correct all the mistakes, some may have been missed, and remained in the body of the document. If questions arise, please contact our department.     Dr. Vikram Packer, Infectious Disease Specialist  [phone]  January 13, 2022  9:00 AM

## 2022-01-14 NOTE — PROGRESS NOTES
Patient seen and examined  No labs this AM --> ordered  HFNC 25 lit  Follow labs   Progress note to follow    Please call with questions    Talon Chanel MD FASN  Cell [phone]  Pager: [phone]

## 2022-01-14 NOTE — DIABETES MGMT
Diabetes/ Glycemic Control Plan of Care    Patient was admitted on 01/09/2022 with report of shortness of breath, weakness and recent exposure to COVID-19. Recommendations:     1.) increase basal lantus insulin dose from 30 units to 35 units daily starting today. Order obtained. 2.) cont to monitor and increase lantus dose to 40 units daily on 01/15/2022 if fasting BG is still above target. 01/13: Patient remain in ED and noted BG values still elevated after increasing basal lantus insulin dose yesterday from 25 units daily to 30 units daily. Assessment:   DX:   1. COVID        Fasting/ Morning blood glucose:   Lab Results   Component Value Date/Time    Glucose 211 (H) 01/13/2022 03:50 AM    Glucose (POC) 228 (H) 01/14/2022 08:11 AM     IV Fluids containing dextrose:  None    Steroids:   Rx Glucocorticoids (24h ago, onward)             Start     Dose Route Frequency Ordered Stop    01/10/22 0057  dexamethasone (DECADRON) 4 mg/mL injection 6 mg         6 mg IV EVERY 12 HOURS 01/10/22 0056 --               Rx Glucocorticoids (24h ago, onward)             Start     Dose Route Frequency Ordered Stop    01/10/22 0057  dexamethasone (DECADRON) 4 mg/mL injection 6 mg         6 mg IV EVERY 12 HOURS 01/10/22 0056 --                 Blood glucose values: Within target range (70-180mg/dL): No    Current insulin orders:   Correctional lispro insulin. Very resistant dose  Basal lantus insulin 35 units daily    Total Daily Dose previous 24 hours: 63 units  Lantus: 30 units  Lispro: 33 units    Current A1c:   Lab Results   Component Value Date/Time    Hemoglobin A1c 7.8 (H) 01/10/2022 05:10 AM      equivalent  to ave Blood Glucose of 177 mg/dl for 2-3 months prior to admission. Adequate glycemic control PTA: No    Nutrition/Diet:   Active Orders   Diet    ADULT DIET Regular; 4 carb choices (60 gm/meal); Low Fat/Low Chol/High Fiber/2 gm Na      Meal Intake:  No data found.   Supplement Intake:  No data found.    Home diabetes medications:   Key Antihyperglycemic Medications             SITagliptin-metFORMIN (JANUMET) 50-1,000 mg per tablet Take 1 Tab by mouth two (2) times daily (with meals).           Plan/Goals:   Blood glucose will be within target of 70 - 180 mg/dl within 72 hours       Education:  [] Refer to Diabetes Education Record                       [x] Education not indicated at this time     Malik Jimenez RN  Pager: [phone]

## 2022-01-14 NOTE — ED NOTES
Patient incontinent of large amount of urine. Patient cleaned, linens changed, new brief applied, gown changed, then repositioned to comfort. Patient instructed to call for assistance, if needed; call bell within reach. Primary nurse updated on the patient's status.

## 2022-01-14 NOTE — ED NOTES
Report received from AIDE Johnson  Upon enteting patients room, pt has pulled Oxygen off and cardiac leads  Patient sating 85% on RA  High flow placed back on patient  VS updated

## 2022-01-15 LAB
GLUCOSE BLD STRIP.AUTO-MCNC: 204 MG/DL (ref 70–110)
GLUCOSE BLD STRIP.AUTO-MCNC: 275 MG/DL (ref 70–110)
GLUCOSE BLD STRIP.AUTO-MCNC: 288 MG/DL (ref 70–110)
GLUCOSE BLD STRIP.AUTO-MCNC: 302 MG/DL (ref 70–110)

## 2022-01-15 PROCEDURE — 74011636637 HC RX REV CODE- 636/637: Performed by: INTERNAL MEDICINE

## 2022-01-15 PROCEDURE — 99233 SBSQ HOSP IP/OBS HIGH 50: CPT | Performed by: INTERNAL MEDICINE

## 2022-01-15 PROCEDURE — 74011250636 HC RX REV CODE- 250/636: Performed by: INTERNAL MEDICINE

## 2022-01-15 PROCEDURE — 94640 AIRWAY INHALATION TREATMENT: CPT

## 2022-01-15 PROCEDURE — 94761 N-INVAS EAR/PLS OXIMETRY MLT: CPT

## 2022-01-15 PROCEDURE — 65660000000 HC RM CCU STEPDOWN

## 2022-01-15 PROCEDURE — 74011636637 HC RX REV CODE- 636/637: Performed by: FAMILY MEDICINE

## 2022-01-15 PROCEDURE — 77010033711 HC HIGH FLOW OXYGEN

## 2022-01-15 PROCEDURE — 74011250637 HC RX REV CODE- 250/637: Performed by: INTERNAL MEDICINE

## 2022-01-15 PROCEDURE — 94762 N-INVAS EAR/PLS OXIMTRY CONT: CPT

## 2022-01-15 PROCEDURE — 74011000250 HC RX REV CODE- 250: Performed by: HOSPITALIST

## 2022-01-15 PROCEDURE — 74011250637 HC RX REV CODE- 250/637: Performed by: FAMILY MEDICINE

## 2022-01-15 PROCEDURE — 2709999900 HC NON-CHARGEABLE SUPPLY

## 2022-01-15 PROCEDURE — 82962 GLUCOSE BLOOD TEST: CPT

## 2022-01-15 RX ADMIN — RIVAROXABAN 15 MG: 15 TABLET, FILM COATED ORAL at 10:18

## 2022-01-15 RX ADMIN — IPRATROPIUM BROMIDE AND ALBUTEROL SULFATE 3 ML: .5; 3 SOLUTION RESPIRATORY (INHALATION) at 17:09

## 2022-01-15 RX ADMIN — Medication 5 MG: at 22:21

## 2022-01-15 RX ADMIN — Medication 6 UNITS: at 22:22

## 2022-01-15 RX ADMIN — Medication 35 UNITS: at 10:31

## 2022-01-15 RX ADMIN — Medication 9 UNITS: at 10:31

## 2022-01-15 RX ADMIN — SODIUM CHLORIDE, PRESERVATIVE FREE 10 ML: 5 INJECTION INTRAVENOUS at 17:10

## 2022-01-15 RX ADMIN — Medication 5000 UNITS: at 10:18

## 2022-01-15 RX ADMIN — IPRATROPIUM BROMIDE AND ALBUTEROL SULFATE 3 ML: .5; 3 SOLUTION RESPIRATORY (INHALATION) at 10:18

## 2022-01-15 RX ADMIN — Medication 9 UNITS: at 17:24

## 2022-01-15 RX ADMIN — SODIUM CHLORIDE, PRESERVATIVE FREE 10 ML: 5 INJECTION INTRAVENOUS at 22:24

## 2022-01-15 RX ADMIN — AMLODIPINE BESYLATE 10 MG: 10 TABLET ORAL at 10:18

## 2022-01-15 RX ADMIN — Medication 12 UNITS: at 13:34

## 2022-01-15 RX ADMIN — RIVAROXABAN 15 MG: 15 TABLET, FILM COATED ORAL at 17:09

## 2022-01-15 RX ADMIN — IPRATROPIUM BROMIDE AND ALBUTEROL SULFATE 3 ML: .5; 3 SOLUTION RESPIRATORY (INHALATION) at 13:35

## 2022-01-15 RX ADMIN — DEXAMETHASONE SODIUM PHOSPHATE 10 MG: 4 INJECTION, SOLUTION INTRA-ARTICULAR; INTRALESIONAL; INTRAMUSCULAR; INTRAVENOUS; SOFT TISSUE at 10:17

## 2022-01-15 RX ADMIN — FAMOTIDINE 20 MG: 20 TABLET ORAL at 10:18

## 2022-01-15 NOTE — ED NOTES
Dinner trays not delivered to ED  Will bring tray to floor when delivered to ED  Pt transported out of ED with transportation tech with all belongings in labeled pt belongings bag.

## 2022-01-15 NOTE — PROGRESS NOTES
Per Dr Ramiro Anderson, requested 15 liter Salter as he tested pt off high flow and on 15 liters for sats to be maintained at 88-90%. Salter was provided by RT sats 88-91%. Will continue to monitor at this time. No resp distress noted.

## 2022-01-15 NOTE — PROGRESS NOTES
Dr Michelle Cedeño is covering for me this weekend .    He will be rounding on patients and  can be contacted on cell [phone]  I am available on phone and can be contacted on [phone]    Still needing 25 lit HFNC  Renal panel pending , needs followed  See Dr Nancy Hurley progress note later today    Please call with questions,    Farhad Laughlin MD [facility]  Cell [phone]  Pager: [phone]

## 2022-01-15 NOTE — ED NOTES
TRANSFER - OUT REPORT:    Verbal report given to Eh(name) on Sneha Orr  being transferred to [address] [place] [address](unit) for routine progression of care       Report consisted of patients Situation, Background, Assessment and   Recommendations(SBAR). Information from the following report(s) SBAR, Kardex, ED Summary, Procedure Summary, Intake/Output, MAR, Recent Results, Alarm Parameters  and Quality Measures was reviewed with the receiving nurse. Lines:   Peripheral IV 01/13/22 Right Antecubital (Active)   Site Assessment Clean, dry, & intact 01/13/22 0428   Phlebitis Assessment 0 01/13/22 0428   Infiltration Assessment 0 01/13/22 0428   Dressing Status Clean, dry, & intact; New 01/13/22 0428   Dressing Type Transparent 01/13/22 0428   Hub Color/Line Status Green;Patent; Flushed 01/13/22 0428        Opportunity for questions and clarification was provided.       Patient transported with:   APERA BAGS

## 2022-01-15 NOTE — PROGRESS NOTES
Hospitalist Progress Note    Patient: Chaz Rosenthal Age: 46 y.o. : 1970 MR#: [id number] SSN: [id number]  Date/Time: 1/15/2022 12:40 PM    DOA: 2022  PCP: Crystal Floyd MD    Subjective:       He was seen in ER 3  He remains at 84% while on room air when weaned him off his HFNC  Weaned to salter at 15 L, his O2 remained at 93%  He has no respiratory distress or complaint  He has been on Xarelto for his DVT    He remains on soft wrist restrain due to continue pulling off his oxygen tube     Interval Hospital Course:  46 y.o male with HTN, type 2 DM, h/o CVA with right-sided hemiparesis, presented to the ER with worsened shortness of breath. He has came in contact with COVID-19 positive patient. He has no chest pain. No fever. He was not vaccinated. In the ER, he was found to have hypoxia, covid-19 positive, MONICA. ID consulted for further care. He has been on Decadron, Baricitinib, azithromycin, ceftriaxone. Duplex showed acute DVT bilaterally        ROS:  No current fever/chills, no headache, no dizziness, no facial pain, no sinus congestion,   No swallowing pain, No chest pain, no palpitation, +shortness of breath, no abd pain,  No diarrhea, no urinary complaint, no leg pain or swelling    Assessment/Plan:     1. Acute respiratory failure with hypoxia due to COVID-19 infection   2. COVID-19 pneumonia   3. MONICA on CKD3, associate with covid infection, prerenal ?  4. Elevated troponin, demand ischemia,   5. Hyperglycemia with T2DM, HgbA1c 7.8%  6. Acute DVT on legs, associate hypercoagulable state due to covid   7. H/o stroke     On decadron 10mg Q12hrs, ID follows. Droplet precaution  Continue 15 L HFNC. Transfer to telemetry today  Off  Azithromycin, ceftriaxone, low procalc  Cont Xarelto for acute DVT  Cont duoneb  + Lantus 35 units, +ISS  Melatonin  pepcid  ICS  US renal. IV fluid slow for now.  Nephrology consulted     Full code     Disposition planning: pending clinical improvement   Family updated (.called his wife [phone], but no answer.)  Additional Notes:    Time spent >35  minutes    Case discussed with:  [x]Patient  [x]Family  [x]Nursing  []Case Management  DVT Prophylaxis:  []Lovenox  []Hep SQ  []SCDs  []Coumadin   [x]On Heparin gtt    Signed By: Daniella Edmondson MD     January 15, 2022 12:40 PM              Objective:   VS:   Visit Vitals  BP (!) 115/96   Pulse (!) 107   Temp 98 °F (36.7 °C)   Resp 22   Ht 5' 11\" (1.803 m)   Wt 108.9 kg (240 lb)   SpO2 95%   BMI 33.47 kg/m²      Tmax/24hrs: Temp (24hrs), Av.6 °F (37 °C), Min:98 °F (36.7 °C), Max:99.1 °F (37.3 °C)    No intake or output data in the 24 hours ending 01/15/22 1241    Tele: sinus   General:  Cooperative, Not in acute distress, speaks in short sentence while in bed  HEENT: PERRL, EOMI, supple neck, no JVD, dry oral mucosa  Cardiovascular: S1S2 regular, no rub/gallop   Pulmonary: air entry bilaterally, no wheezing, no crackle  GI:  Soft, non tender, non distended, +bs, no guarding   Extremities:  +pedal edema, +distal pulses appreciated   Neuro: AOx3, moving all extremities, no gross deficit.      Additional:       Current Facility-Administered Medications   Medication Dose Route Frequency    insulin glargine (LANTUS) injection 35 Units  35 Units SubCUTAneous DAILY    hydrALAZINE (APRESOLINE) 20 mg/mL injection 10 mg  10 mg IntraVENous Q6H PRN    amLODIPine (NORVASC) tablet 10 mg  10 mg Oral DAILY    rivaroxaban (XARELTO) tablet 15 mg  15 mg Oral BID WITH MEALS    dexamethasone (DECADRON) 4 mg/mL injection 10 mg  10 mg IntraVENous Q24H    sodium chloride (NS) flush 5-40 mL  5-40 mL IntraVENous Q8H    sodium chloride (NS) flush 5-40 mL  5-40 mL IntraVENous PRN    acetaminophen (TYLENOL) tablet 650 mg  650 mg Oral Q6H PRN    Or    acetaminophen (TYLENOL) suppository 650 mg  650 mg Rectal Q6H PRN    polyethylene glycol (MIRALAX) packet 17 g  17 g Oral DAILY PRN    ondansetron (ZOFRAN ODT) tablet 4 mg  4 mg Oral Q8H PRN Or    ondansetron (ZOFRAN) injection 4 mg  4 mg IntraVENous Q6H PRN    albuterol-ipratropium (DUO-NEB) 2.5 MG-0.5 MG/3 ML  3 mL Nebulization Q4H RT    insulin lispro (HUMALOG) injection   SubCUTAneous AC&HS    glucose chewable tablet 16 g  4 Tablet Oral PRN    glucagon (GLUCAGEN) injection 1 mg  1 mg IntraMUSCular PRN    dextrose (D50W) injection syrg 12.5-25 g  25-50 mL IntraVENous PRN    famotidine (PEPCID) tablet 20 mg  20 mg Oral DAILY    melatonin tablet 5 mg  5 mg Oral QHS    cholecalciferol (VITAMIN D3) capsule 5,000 Units  5,000 Units Oral DAILY     Current Outpatient Medications   Medication Sig    amLODIPine-Olmesartan 10-40 mg tab Take  by mouth.  SITagliptin-metFORMIN (JANUMET) 50-1,000 mg per tablet Take 1 Tab by mouth two (2) times daily (with meals).  metoprolol (LOPRESSOR) 50 mg tablet Take 1 Tab by mouth two (2) times a day.  HYDROcodone-acetaminophen (NORCO) 5-325 mg per tablet Take 1 Tab by mouth every four (4) hours as needed for Pain.             Lab/Data Review:  Labs: Results:       Chemistry Recent Labs     01/13/22  0350   *      K 4.5      CO2 26   BUN 45*   CREA 1.86*   BUCR 24*   AGAP 6   CA 9.2     Recent Labs     01/13/22  0350   ALT 84*   TP 7.8   ALB 2.6*   GLOB 5.2*   AGRAT 0.5*      CBC w/Diff Recent Labs     01/13/22  0350   WBC 12.3   RBC 4.75   HGB 13.2   HCT 41.2   MCV 86.7   MCH 27.8   MCHC 32.0   RDW 14.1      GRANS 88*   LYMPH 5*   EOS 0      Coagulation Recent Labs     01/14/22  0429 01/13/22  1025   APTT 31.2 122.9*       Iron/Ferritin Lab Results   Component Value Date/Time    Ferritin 1,426 (H) 01/09/2022 08:34 PM       BNP    Cardiac Enzymes Lab Results   Component Value Date/Time     (H) 01/11/2022 03:39 PM        Lactic Acid    Thyroid Studies          All Micro Results     Procedure Component Value Units Date/Time    COVID-19 RAPID TEST [[id number]]  (Abnormal) Collected: 01/09/22 2042    Order Status: Completed Specimen: Nasopharyngeal Updated: 01/09/22 2259     Specimen source Nasopharyngeal        COVID-19 rapid test Detected        Comment:      The specimen is POSITIVE for SARS-CoV-2, the novel coronavirus associated with COVID-19. This test has been authorized by the FDA under an Emergency Use Authorization (EUA) for use by authorized laboratories. Fact sheet for Healthcare Providers: Feniksdate.co.nz  Fact sheet for Patients: [employer].co.nz       Methodology: Isothermal Nucleic Acid Amplification  CALLED TO AND CORRECTLY REPEATED BY:  SEDA TRINIDAD RN IN ED AT 7295 ON 01/9/22 TO [facility]                 Images:    CT (Most Recent). XRAY (Most Recent) XR Results (most recent):  Results from Hospital Encounter encounter on 01/09/22    XR CHEST PORT    Narrative  EXAM: Chest Radiograph    INDICATION:  sob/hypoxic    TECHNIQUE: AP view of the chest    COMPARISON: 4/8/2007    FINDINGS:  Patchy bilateral opacities greater on the right lung field. No effusions  identified. No pneumothorax identified. There is partial obscuration of the right heart border. The osseous structures are unremarkable. Impression  1. Patchy bilateral opacities greater in the right lung. May represent  infectious process including covid 19 pneumonia. EKG No results found for this or any previous visit.      2D ECHO

## 2022-01-16 ENCOUNTER — APPOINTMENT (OUTPATIENT)
Dept: GENERAL RADIOLOGY | Age: 52
DRG: 177 | End: 2022-01-16
Attending: STUDENT IN AN ORGANIZED HEALTH CARE EDUCATION/TRAINING PROGRAM
Payer: MEDICARE

## 2022-01-16 LAB
ALBUMIN SERPL-MCNC: 2.5 G/DL (ref 3.4–5)
ANION GAP SERPL CALC-SCNC: 8 MMOL/L (ref 3–18)
BUN SERPL-MCNC: 52 MG/DL (ref 7–18)
BUN/CREAT SERPL: 31 (ref 12–20)
CALCIUM SERPL-MCNC: 8.5 MG/DL (ref 8.5–10.1)
CHLORIDE SERPL-SCNC: 108 MMOL/L (ref 100–111)
CO2 SERPL-SCNC: 23 MMOL/L (ref 21–32)
CREAT SERPL-MCNC: 1.66 MG/DL (ref 0.6–1.3)
CRP SERPL-MCNC: 2.5 MG/DL (ref 0–0.3)
ERYTHROCYTE [DISTWIDTH] IN BLOOD BY AUTOMATED COUNT: 13.3 % (ref 11.6–14.5)
GLUCOSE BLD STRIP.AUTO-MCNC: 108 MG/DL (ref 70–110)
GLUCOSE BLD STRIP.AUTO-MCNC: 173 MG/DL (ref 70–110)
GLUCOSE BLD STRIP.AUTO-MCNC: 295 MG/DL (ref 70–110)
GLUCOSE BLD STRIP.AUTO-MCNC: 299 MG/DL (ref 70–110)
GLUCOSE SERPL-MCNC: 124 MG/DL (ref 74–99)
HCT VFR BLD AUTO: 37.7 % (ref 36–48)
HGB BLD-MCNC: 12.7 G/DL (ref 13–16)
MCH RBC QN AUTO: 28.4 PG (ref 24–34)
MCHC RBC AUTO-ENTMCNC: 33.7 G/DL (ref 31–37)
MCV RBC AUTO: 84.3 FL (ref 78–100)
NRBC # BLD: 0.05 K/UL (ref 0–0.01)
NRBC BLD-RTO: 0.5 PER 100 WBC
PHOSPHATE SERPL-MCNC: 3.4 MG/DL (ref 2.5–4.9)
PLATELET # BLD AUTO: 246 K/UL (ref 135–420)
PMV BLD AUTO: 12.9 FL (ref 9.2–11.8)
POTASSIUM SERPL-SCNC: 4.3 MMOL/L (ref 3.5–5.5)
RBC # BLD AUTO: 4.47 M/UL (ref 4.35–5.65)
SODIUM SERPL-SCNC: 139 MMOL/L (ref 136–145)
WBC # BLD AUTO: 10.6 K/UL (ref 4.6–13.2)

## 2022-01-16 PROCEDURE — 74011000250 HC RX REV CODE- 250: Performed by: HOSPITALIST

## 2022-01-16 PROCEDURE — 74011250637 HC RX REV CODE- 250/637: Performed by: INTERNAL MEDICINE

## 2022-01-16 PROCEDURE — 36415 COLL VENOUS BLD VENIPUNCTURE: CPT

## 2022-01-16 PROCEDURE — 85027 COMPLETE CBC AUTOMATED: CPT

## 2022-01-16 PROCEDURE — 2709999900 HC NON-CHARGEABLE SUPPLY

## 2022-01-16 PROCEDURE — 77030040831 HC BAG URINE DRNG MDII -A

## 2022-01-16 PROCEDURE — 86140 C-REACTIVE PROTEIN: CPT

## 2022-01-16 PROCEDURE — APPSS45 APP SPLIT SHARED TIME 31-45 MINUTES: Performed by: NURSE PRACTITIONER

## 2022-01-16 PROCEDURE — 71045 X-RAY EXAM CHEST 1 VIEW: CPT

## 2022-01-16 PROCEDURE — 74011250637 HC RX REV CODE- 250/637: Performed by: FAMILY MEDICINE

## 2022-01-16 PROCEDURE — 65660000000 HC RM CCU STEPDOWN

## 2022-01-16 PROCEDURE — 74011250637 HC RX REV CODE- 250/637: Performed by: STUDENT IN AN ORGANIZED HEALTH CARE EDUCATION/TRAINING PROGRAM

## 2022-01-16 PROCEDURE — 80069 RENAL FUNCTION PANEL: CPT

## 2022-01-16 PROCEDURE — 74011636637 HC RX REV CODE- 636/637: Performed by: INTERNAL MEDICINE

## 2022-01-16 PROCEDURE — 82962 GLUCOSE BLOOD TEST: CPT

## 2022-01-16 PROCEDURE — 94640 AIRWAY INHALATION TREATMENT: CPT

## 2022-01-16 PROCEDURE — 74011250636 HC RX REV CODE- 250/636: Performed by: INTERNAL MEDICINE

## 2022-01-16 PROCEDURE — 74011636637 HC RX REV CODE- 636/637: Performed by: FAMILY MEDICINE

## 2022-01-16 PROCEDURE — 99233 SBSQ HOSP IP/OBS HIGH 50: CPT | Performed by: INTERNAL MEDICINE

## 2022-01-16 RX ADMIN — FAMOTIDINE 20 MG: 20 TABLET ORAL at 08:30

## 2022-01-16 RX ADMIN — AMLODIPINE BESYLATE 10 MG: 10 TABLET ORAL at 08:30

## 2022-01-16 RX ADMIN — IPRATROPIUM BROMIDE AND ALBUTEROL 1 PUFF: 20; 100 SPRAY, METERED RESPIRATORY (INHALATION) at 18:14

## 2022-01-16 RX ADMIN — Medication 9 UNITS: at 16:30

## 2022-01-16 RX ADMIN — Medication 5 MG: at 21:52

## 2022-01-16 RX ADMIN — SODIUM CHLORIDE, PRESERVATIVE FREE 10 ML: 5 INJECTION INTRAVENOUS at 21:53

## 2022-01-16 RX ADMIN — DEXAMETHASONE SODIUM PHOSPHATE 10 MG: 4 INJECTION, SOLUTION INTRA-ARTICULAR; INTRALESIONAL; INTRAMUSCULAR; INTRAVENOUS; SOFT TISSUE at 08:30

## 2022-01-16 RX ADMIN — Medication 5000 UNITS: at 08:29

## 2022-01-16 RX ADMIN — Medication 9 UNITS: at 21:52

## 2022-01-16 RX ADMIN — RIVAROXABAN 15 MG: 15 TABLET, FILM COATED ORAL at 18:04

## 2022-01-16 RX ADMIN — SODIUM CHLORIDE, PRESERVATIVE FREE 10 ML: 5 INJECTION INTRAVENOUS at 07:02

## 2022-01-16 RX ADMIN — Medication 3 UNITS: at 12:41

## 2022-01-16 RX ADMIN — SODIUM CHLORIDE, PRESERVATIVE FREE 10 ML: 5 INJECTION INTRAVENOUS at 15:49

## 2022-01-16 RX ADMIN — RIVAROXABAN 15 MG: 15 TABLET, FILM COATED ORAL at 08:29

## 2022-01-16 RX ADMIN — Medication 35 UNITS: at 09:00

## 2022-01-16 RX ADMIN — IPRATROPIUM BROMIDE AND ALBUTEROL 1 PUFF: 20; 100 SPRAY, METERED RESPIRATORY (INHALATION) at 19:37

## 2022-01-16 RX ADMIN — IPRATROPIUM BROMIDE AND ALBUTEROL 1 PUFF: 20; 100 SPRAY, METERED RESPIRATORY (INHALATION) at 12:47

## 2022-01-16 NOTE — PROGRESS NOTES
[facility] Hospitalist Group  Progress Note    Patient: Angelica Head Age: 46 y.o. : 1970 MR#: [id number] SSN: [id number]  Date: 2022     Subjective:   Alert and oriented x3. NAD. On hiflow oxygen at 12L. RRT this am for hypoxia. Hypoxia noted mid 80's while on RA. NAD acute distress at that time, no SOB or cough symptoms. Assessment/Plan:   1. Acute respiratory failure with hypoxia secondary to COVID-19 PNA  2. COVID-19 PNA  3. MONICA on CKD 3 in setting of COVID  4. Elevated troponin, demand ischemia   5. Hyperglycemia, ,DMT2  6. Acute on chronic DVT BLE  7. History of stroke    Plan  1. RRT this am. Continue hiflow oxygen and wean as tolerated. 2. Continue dexamethasone, combivient. Monitor inflammatory markers. ID consult. duonebs on hold. Monitor while off antibiotics  3. Continue xarelto  4. POC glucose, SSI. lantus   5. Nephrology consult. Monitor renal function  6. Monitor metabolic panel   7. Patient is alert and oriented x3 and of sound mind to update family. When asked if I can update wife, he refused at this time.    Additional Notes:      Case discussed with:  [x]Patient  []Family  [x]Nursing  []Case Management  DVT Prophylaxis:  []Lovenox  []Hep SQ  []SCDs  []Coumadin   []On Heparin gtt    Objective:   VS:   Visit Vitals  /67   Pulse 100   Temp 98.5 °F (36.9 °C)   Resp 25   Ht 5' 11\" (1.803 m)   Wt 108.9 kg (240 lb)   SpO2 (!) 84% Comment: Notified Nurse Salome Jerry   BMI 33.47 kg/m²      Tmax/24hrs: Temp (24hrs), Av.3 °F (36.8 °C), Min:98 °F (36.7 °C), Max:98.5 °F (36.9 °C)  No intake or output data in the 24 hours ending 22 1107    General:  Alert, NAD  Cardiovascular:  RRR  Pulmonary:  LSC throughout; respiratory effort WNL  GI:  +BS in all four quadrants, soft, non-tender  Extremities:  No edema; 2+ dorsalis pedis pulses bilaterally  Neuro: alert and oriented x3      Labs:    Recent Results (from the past 24 hour(s))   GLUCOSE, POC    Collection Time: 01/15/22  1:32 PM   Result Value Ref Range    Glucose (POC) 302 (H) 70 - 110 mg/dL   GLUCOSE, POC    Collection Time: 01/15/22  5:17 PM   Result Value Ref Range    Glucose (POC) 288 (H) 70 - 110 mg/dL   GLUCOSE, POC    Collection Time: 01/15/22  9:27 PM   Result Value Ref Range    Glucose (POC) 204 (H) 70 - 110 mg/dL   RENAL FUNCTION PANEL    Collection Time: 01/16/22  3:45 AM   Result Value Ref Range    Sodium 139 136 - 145 mmol/L    Potassium 4.3 3.5 - 5.5 mmol/L    Chloride 108 100 - 111 mmol/L    CO2 23 21 - 32 mmol/L    Anion gap 8 3.0 - 18 mmol/L    Glucose 124 (H) 74 - 99 mg/dL    BUN 52 (H) 7.0 - 18 MG/DL    Creatinine 1.66 (H) 0.6 - 1.3 MG/DL    BUN/Creatinine ratio 31 (H) 12 - 20      GFR est AA 53 (L) >60 ml/min/1.73m2    GFR est non-AA 44 (L) >60 ml/min/1.73m2    Calcium 8.5 8.5 - 10.1 MG/DL    Phosphorus 3.4 2.5 - 4.9 MG/DL    Albumin 2.5 (L) 3.4 - 5.0 g/dL   C REACTIVE PROTEIN, QT    Collection Time: 01/16/22  3:45 AM   Result Value Ref Range    C-Reactive protein 2.5 (H) 0 - 0.3 mg/dL   CBC W/O DIFF    Collection Time: 01/16/22  3:45 AM   Result Value Ref Range    WBC 10.6 4.6 - 13.2 K/uL    RBC 4.47 4.35 - 5.65 M/uL    HGB 12.7 (L) 13.0 - 16.0 g/dL    HCT 37.7 36.0 - 48.0 %    MCV 84.3 78.0 - 100.0 FL    MCH 28.4 24.0 - 34.0 PG    MCHC 33.7 31.0 - 37.0 g/dL    RDW 13.3 11.6 - 14.5 %    PLATELET 519 023 - 965 K/uL    MPV 12.9 (H) 9.2 - 11.8 FL    NRBC 0.5 (H) 0  WBC    ABSOLUTE NRBC 0.05 (H) 0.00 - 0.01 K/uL   GLUCOSE, POC    Collection Time: 01/16/22  7:00 AM   Result Value Ref Range    Glucose (POC) 108 70 - 110 mg/dL       Signed By: Elana Morgan NP     January 16, 2022

## 2022-01-16 NOTE — PROGRESS NOTES
responded to Rapid Response for  Juwan Lisa, who is a 46 y.o.,male,     The  provided the following Interventions:  Provided crisis spiritual care through presence and support. Offered silent prayers on behalf for the patient. Chart reviewed. The following outcomes were achieved:      Assessment:  There are no further spiritual or Yazdanism issues which require intervention at this time. Plan:  Chaplains will continue to follow and will provide spiritual care as needed.  recommends bedside caregivers page  on duty if patient or family shows signs of acute spiritual or emotional distress.      Calli Noble [address]   [phone]

## 2022-01-16 NOTE — PROGRESS NOTES
RRT called at [phone]. Pt O2 sats at 72%. Pt placed on nonrebreather and improved to 98%. Respiratory placed pt on 12L Hiflow salter with sats in the low 90's.     1940-Bedside shift change report given to Kathie Diaz, [address] [place] [address] (oncoming nurse) by Homero Orosco RN (offgoing nurse). Report included the following information SBAR, Kardex, MAR and Cardiac Rhythm NSR.

## 2022-01-16 NOTE — ROUTINE PROCESS
1740-received patient from ED, initial assessment completed, call bell within reach ,no distress noted. Bedside and Verbal shift change report given to Lisa (oncoming nurse) by William Lobato (offgoing nurse). Report included the following information SBAR, Kardex and MAR.

## 2022-01-16 NOTE — PROGRESS NOTES
Rapid Response Note  [facility] Medicine    Patient: Michelle Richards 46 y.o. male  [id number]  1970      Admit Date: 1/9/2022   Admission Diagnosis: COVID [U07.1]  Pneumonia due to COVID-19 virus [U07.1, J12.82]    RAPID RESPONSE     Rapid response called for Hypoxia to the mid 80s on room air. Per nursing, was on supplemental oxygen prior to getting a room assigned last night, but was not requiring oxygen until now. In the room, Mr. Donny Gray appeared comfortable and did not express and discomfort to include shortness of breath or chest discomfort. He was started on nonrebreather at 12 LPM with increasing oxygenation to the mid 90s. Medications Reviewed    OBJECTIVE     Patient Vitals for the past 12 hrs:   Temp Pulse Resp BP SpO2   01/16/22 0410 98.5 °F (36.9 °C) (!) 117 20 116/77 98 %   01/16/22 0028 98.2 °F (36.8 °C) -- 20 112/80 97 %         PHYSICAL:  General:  Alert and Responsive and in no acute distress. CV:  RRR, no murmurs, rubs, or gallops. PMI not displaced. No visible pulsations or thrills. No carotid bruits. RESP:  Unlabored breathing. Right lower lung rales. ABD:  Soft, nontender, nondistended. Normoactive bowel sounds. No hepatosplenomegaly. No suprapubic tenderness. No CVA tenderness. Neuro:    Sensation grossly intact. A+Ox3. MSK: No lower extremity edema. ASSESSMENT, PLAN & DISPOSITION   Michelle Richards is a 46y.o. year old male admitted for COVID [U07.1]  Pneumonia due to COVID-19 virus [U07.1, J12.82]. Rapid response called for hypoxia. Patient condition currently: stable. He will be started on Salter for better adjustment of oxygen requirements. He is also due for bronchodilator treatment. Suspect only respiratory at this time, given improvements in SpO2 with coughing. Medications Administered: None    Labs ordered/Pending: CXR    Disposition: Stable to remain at current level of care    Attending Amairani Arellnao MD notified of rapid response.  In agreement with plan. Primary team resuming care.      Senior resident, Dr. Malina Cuello,  present during RRT and evaluation    Destinee Daniel DO, PGY-1  [facility] [facility]  9:03 AM 01/16/22

## 2022-01-16 NOTE — ROUTINE PROCESS
0220-- assumed care of patient -- he is resting watching tv-- no complaints    Bedside and Verbal shift change report given to BETTINA NOBLE (oncoming nurse) by Marley Goodrich RN (offgoing nurse). Report given with SBAR, Kardex, Intake/Output, MAR, Accordion and Recent Results.

## 2022-01-16 NOTE — PROGRESS NOTES
RENAL PROGRESS NOTE        Lacie De Guzman         Assessment/Plan:     · MONICA in a setting of covid pneumonia/sepsis. Scr is up slightly but improved overall and is not too far from baseline. · CKD 3B due to dm/htn. · HTN. Stable, no need for ideal control at this time. · Covid pneumonia. · Bl le dvt, on xarelto. · Will f/u on Monday, please call if any questions. Subjective:  Patient complaints off: Feels better, breathing is better. No CP/N/V. Appetite is better.        Patient Active Problem List   Diagnosis Code    Pneumonia due to COVID-19 virus U07.1, J12.82    Acute respiratory failure with hypoxia (HCC) J96.01    Acute deep vein thrombosis (DVT) of both lower extremities ([facility]) I82.403    Hypercoagulable state associated with COVID-19 ([facility] U07.1, D68.69    MONICA (acute kidney injury) ([facility] N17.9    Non-traumatic rhabdomyolysis M62.82       Current Facility-Administered Medications   Medication Dose Route Frequency Provider Last Rate Last Admin    insulin glargine (LANTUS) injection 35 Units  35 Units SubCUTAneous DAILY Fabienne Thomson MD   35 Units at 01/15/22 1031    hydrALAZINE (APRESOLINE) 20 mg/mL injection 10 mg  10 mg IntraVENous Q6H PRN Fabienne Thomson MD        amLODIPine (NORVASC) tablet 10 mg  10 mg Oral DAILY Fabienne Thomson MD   10 mg at 01/15/22 1018    rivaroxaban (XARELTO) tablet 15 mg  15 mg Oral BID WITH MEALS Fabienne Thomson MD   15 mg at 01/15/22 1709    dexamethasone (DECADRON) 4 mg/mL injection 10 mg  10 mg IntraVENous Q24H Preston RUFF MD   10 mg at 01/15/22 1017    sodium chloride (NS) flush 5-40 mL  5-40 mL IntraVENous Q8H Dario Molina MD   10 mL at 01/15/22 1710    sodium chloride (NS) flush 5-40 mL  5-40 mL IntraVENous PRN Dario Molina MD        acetaminophen (TYLENOL) tablet 650 mg  650 mg Oral Q6H PRN Vic Dandy, MD        Or   Rhys Herbert acetaminophen (TYLENOL) suppository 650 mg  650 mg Rectal Q6H PRN Vic Dandy, MD        polyethylene glycol (MIRALAX) packet 17 g  17 g Oral DAILY PRN Vic Dandy, MD        ondansetron (ZOFRAN ODT) tablet 4 mg  4 mg Oral Q8H PRN Vic Dandy, MD        Or    ondansetron [facility]) injection 4 mg  4 mg IntraVENous Q6H PRN Vic Dandy, MD        albuterol-ipratropium (DUO-NEB) 2.5 MG-0.5 MG/3 ML  3 mL Nebulization Q4H RT Vic Dandy, MD   3 mL at 01/15/22 1709    insulin lispro (HUMALOG) injection   SubCUTAneous AC&HS Amairani Arellano MD   9 Units at 01/15/22 1724    glucose chewable tablet 16 g  4 Tablet Oral PRN Amairani Arellano MD        glucagon (GLUCAGEN) injection 1 mg  1 mg IntraMUSCular PRN Amairani Arellano MD        dextrose (D50W) injection syrg 12.5-25 g  25-50 mL IntraVENous PRN Amairani Arellano MD        famotidine (PEPCID) tablet 20 mg  20 mg Oral DAILY Amairani Arellano MD   20 mg at 01/15/22 1018    melatonin tablet 5 mg  5 mg Oral QHS Amairani Arellano MD   5 mg at 01/14/22 2329    cholecalciferol (VITAMIN D3) capsule 5,000 Units  5,000 Units Oral DAILY Amairani Arellano MD   5,000 Units at 01/15/22 1018       Objective  Vitals:    01/15/22 1400 01/15/22 1408 01/15/22 1500 01/15/22 1600   BP: 123/83  109/77 117/64   Pulse: 84  94 90   Resp: 18  25 24   Temp:    98.1 °F (36.7 °C)   SpO2:  90%  90%   Weight:       Height:           No intake or output data in the 24 hours ending 01/15/22 2026        Admission weight: Weight: 108.9 kg (240 lb) (01/09/22 2027)  Last Weight Metrics:  Weight Loss Metrics 1/15/2022 1/18/2019 3/24/2014   Today's Wt 240 lb 240 lb 250 lb   BMI 33.47 kg/m2 33.47 kg/m2 34.88 kg/m2             Physical Assessment:     General: NAD, alert and oriented. Neck: No jvd. LUNGS: Clear to Auscultation, No rales, rhonchi or wheezes. CVS EXM: S1, S2  RRR, no murmurs/gallops/rubs. Abdomen: soft, non tender. Lower Extremities:  trace edema. Lab    CBC w/Diff Recent Labs     01/13/22  0350   WBC 12.3   RBC 4.75   HGB 13.2   HCT 41.2      GRANS 88*   LYMPH 5*   EOS 0        Chemistry Recent Labs     01/13/22  0350   *      K 4.5      CO2 26   BUN 45*   CREA 1.86*   CA 9.2   AGAP 6   BUCR 24*   AP 58   TP 7.8   ALB 2.6*   GLOB 5.2*   AGRAT 0.5*         Lab Results   Component Value Date/Time    Ferritin 1,426 (H) 01/09/2022 08:34 PM      Lab Results   Component Value Date/Time    Calcium 9.2 01/13/2022 03:50 AM        [address]ley [address] M.D.   [facility]  Phone

## 2022-01-17 LAB
ALBUMIN SERPL-MCNC: 2.3 G/DL (ref 3.4–5)
ANION GAP SERPL CALC-SCNC: 5 MMOL/L (ref 3–18)
BUN SERPL-MCNC: 49 MG/DL (ref 7–18)
BUN/CREAT SERPL: 27 (ref 12–20)
CALCIUM SERPL-MCNC: 9 MG/DL (ref 8.5–10.1)
CHLORIDE SERPL-SCNC: 104 MMOL/L (ref 100–111)
CO2 SERPL-SCNC: 26 MMOL/L (ref 21–32)
CREAT SERPL-MCNC: 1.83 MG/DL (ref 0.6–1.3)
CRP SERPL-MCNC: 4.5 MG/DL (ref 0–0.3)
ERYTHROCYTE [DISTWIDTH] IN BLOOD BY AUTOMATED COUNT: 13.5 % (ref 11.6–14.5)
GLUCOSE BLD STRIP.AUTO-MCNC: 161 MG/DL (ref 70–110)
GLUCOSE BLD STRIP.AUTO-MCNC: 199 MG/DL (ref 70–110)
GLUCOSE BLD STRIP.AUTO-MCNC: 402 MG/DL (ref 70–110)
GLUCOSE BLD STRIP.AUTO-MCNC: 526 MG/DL (ref 70–110)
GLUCOSE SERPL-MCNC: 232 MG/DL (ref 74–99)
HCT VFR BLD AUTO: 40 % (ref 36–48)
HGB BLD-MCNC: 12.7 G/DL (ref 13–16)
MCH RBC QN AUTO: 27.6 PG (ref 24–34)
MCHC RBC AUTO-ENTMCNC: 31.8 G/DL (ref 31–37)
MCV RBC AUTO: 87 FL (ref 78–100)
NRBC # BLD: 0 K/UL (ref 0–0.01)
NRBC BLD-RTO: 0 PER 100 WBC
PHOSPHATE SERPL-MCNC: 5.1 MG/DL (ref 2.5–4.9)
PLATELET # BLD AUTO: 229 K/UL (ref 135–420)
PMV BLD AUTO: 12.4 FL (ref 9.2–11.8)
POTASSIUM SERPL-SCNC: 4.9 MMOL/L (ref 3.5–5.5)
RBC # BLD AUTO: 4.6 M/UL (ref 4.35–5.65)
SODIUM SERPL-SCNC: 135 MMOL/L (ref 136–145)
WBC # BLD AUTO: 10.2 K/UL (ref 4.6–13.2)

## 2022-01-17 PROCEDURE — 36415 COLL VENOUS BLD VENIPUNCTURE: CPT

## 2022-01-17 PROCEDURE — 65660000000 HC RM CCU STEPDOWN

## 2022-01-17 PROCEDURE — 94640 AIRWAY INHALATION TREATMENT: CPT

## 2022-01-17 PROCEDURE — 74011250636 HC RX REV CODE- 250/636: Performed by: INTERNAL MEDICINE

## 2022-01-17 PROCEDURE — 99232 SBSQ HOSP IP/OBS MODERATE 35: CPT | Performed by: INTERNAL MEDICINE

## 2022-01-17 PROCEDURE — 74011636637 HC RX REV CODE- 636/637: Performed by: FAMILY MEDICINE

## 2022-01-17 PROCEDURE — 86140 C-REACTIVE PROTEIN: CPT

## 2022-01-17 PROCEDURE — 82962 GLUCOSE BLOOD TEST: CPT

## 2022-01-17 PROCEDURE — 85027 COMPLETE CBC AUTOMATED: CPT

## 2022-01-17 PROCEDURE — 80069 RENAL FUNCTION PANEL: CPT

## 2022-01-17 PROCEDURE — APPSS45 APP SPLIT SHARED TIME 31-45 MINUTES: Performed by: NURSE PRACTITIONER

## 2022-01-17 PROCEDURE — 74011000250 HC RX REV CODE- 250: Performed by: HOSPITALIST

## 2022-01-17 PROCEDURE — 2709999900 HC NON-CHARGEABLE SUPPLY

## 2022-01-17 PROCEDURE — 74011250637 HC RX REV CODE- 250/637: Performed by: FAMILY MEDICINE

## 2022-01-17 PROCEDURE — 74011250637 HC RX REV CODE- 250/637: Performed by: INTERNAL MEDICINE

## 2022-01-17 PROCEDURE — 74011250637 HC RX REV CODE- 250/637: Performed by: STUDENT IN AN ORGANIZED HEALTH CARE EDUCATION/TRAINING PROGRAM

## 2022-01-17 PROCEDURE — 74011636637 HC RX REV CODE- 636/637: Performed by: INTERNAL MEDICINE

## 2022-01-17 RX ADMIN — Medication 5 MG: at 21:49

## 2022-01-17 RX ADMIN — IPRATROPIUM BROMIDE AND ALBUTEROL 1 PUFF: 20; 100 SPRAY, METERED RESPIRATORY (INHALATION) at 23:41

## 2022-01-17 RX ADMIN — SODIUM CHLORIDE, PRESERVATIVE FREE 10 ML: 5 INJECTION INTRAVENOUS at 06:27

## 2022-01-17 RX ADMIN — IPRATROPIUM BROMIDE AND ALBUTEROL 1 PUFF: 20; 100 SPRAY, METERED RESPIRATORY (INHALATION) at 16:00

## 2022-01-17 RX ADMIN — SODIUM CHLORIDE, PRESERVATIVE FREE 10 ML: 5 INJECTION INTRAVENOUS at 21:49

## 2022-01-17 RX ADMIN — Medication 3 UNITS: at 12:35

## 2022-01-17 RX ADMIN — IPRATROPIUM BROMIDE AND ALBUTEROL 1 PUFF: 20; 100 SPRAY, METERED RESPIRATORY (INHALATION) at 20:00

## 2022-01-17 RX ADMIN — AMLODIPINE BESYLATE 10 MG: 10 TABLET ORAL at 09:22

## 2022-01-17 RX ADMIN — Medication 35 UNITS: at 09:22

## 2022-01-17 RX ADMIN — DEXAMETHASONE SODIUM PHOSPHATE 10 MG: 4 INJECTION, SOLUTION INTRA-ARTICULAR; INTRALESIONAL; INTRAMUSCULAR; INTRAVENOUS; SOFT TISSUE at 09:23

## 2022-01-17 RX ADMIN — Medication 15 UNITS: at 21:46

## 2022-01-17 RX ADMIN — RIVAROXABAN 15 MG: 15 TABLET, FILM COATED ORAL at 09:22

## 2022-01-17 RX ADMIN — IPRATROPIUM BROMIDE AND ALBUTEROL 1 PUFF: 20; 100 SPRAY, METERED RESPIRATORY (INHALATION) at 00:13

## 2022-01-17 RX ADMIN — IPRATROPIUM BROMIDE AND ALBUTEROL 1 PUFF: 20; 100 SPRAY, METERED RESPIRATORY (INHALATION) at 08:00

## 2022-01-17 RX ADMIN — Medication 5000 UNITS: at 09:22

## 2022-01-17 RX ADMIN — RIVAROXABAN 15 MG: 15 TABLET, FILM COATED ORAL at 18:08

## 2022-01-17 RX ADMIN — IPRATROPIUM BROMIDE AND ALBUTEROL 1 PUFF: 20; 100 SPRAY, METERED RESPIRATORY (INHALATION) at 12:00

## 2022-01-17 RX ADMIN — FAMOTIDINE 20 MG: 20 TABLET ORAL at 09:22

## 2022-01-17 NOTE — PROGRESS NOTES
Pt took off gown, Nasal Cannula and pulled out IV. O2 sats were in the 80's. Placed O2 back on pt with recovery to 93%.

## 2022-01-17 NOTE — PROGRESS NOTES
In Patient Progress note    Admit Date: 1/9/2022    Impression:     #1 Acute kidney injury, baseline creatinine 2019 was 1.5, no labs available since then. --> improved ,   d/t prerenal azotemia Renal functions close to baseline, CT abd with no hydro   #2. Acute hypoxic respiratory failure secondary to COVID-19 pneumonia  #3 COVID-19 pneumonia  #4 hypertension  #5 type 2 diabetes  #6 elevated troponins  #7 transaminitis  #8 hypoalbuminemia  #9 echocardiogram with normal EF but concentric hypertrophy likely from hypertension     Plan:     #1 strict ins and outs, daily weights  #2 encourage po intake   #3 wean O2 as tolerated  #4 follow ID recommendations  #5 avoid IV contrast nephrotoxins  #6 renally dose medications for EGFR of less than 30     Please call with questions,     Leonel Reddy MD FASN  Cell [phone]  Pager: [phone]      Subjective:     - No acute over night events. - respiratory - HFNC  - hemodynamics - stable, no pressrs  - UOP-better  - Nutrition -poor     Objective:     Visit Vitals  /82   Pulse 80   Temp 98 °F (36.7 °C)   Resp 18   Ht 5' 11\" (1.803 m)   Wt 108.9 kg (240 lb)   SpO2 94%   BMI 33.47 kg/m²       No intake or output data in the 24 hours ending 01/17/22 1644    Physical Exam:          HFNC   Mild distress   HEENT: mmm  Lungs: alicja coarse BS   Cardiovascular system: S1, S2, regular rate and rhythm. Abdomen: soft, non tender, non distended. Extremities: no edema   Integumentary: skin is grossly intact. Neurologic: Alert, oriented time three.       Data Review:    Recent Labs     01/17/22  0205   WBC 10.2   RBC 4.60   HCT 40.0   MCV 87.0   MCH 27.6   MCHC 31.8   RDW 13.5     Recent Labs     01/17/22  0205 01/16/22  0345   BUN 49* 52*   CREA 1.83* 1.66*   CA 9.0 8.5   ALB 2.3* 2.5*   K 4.9 4.3   * 139    108   CO2 26 23   PHOS 5.1* 3.4   * 124*       Maninder Kolb MD

## 2022-01-17 NOTE — PROGRESS NOTES
[facility] Hospitalist Group  Progress Note    Patient: Alexander Mijares Age: 46 y.o. : 1970 MR#: [id number] SSN: [id number]  Date: 2022     Subjective:   Alert and oriented x4. On assessment on RA with saturation 93%. NAD. Turned down oxygen to 6L and reminded patient to keep his NC on. Assessment/Plan:   1. Acute respiratory failure with hypoxia secondary to COVID-19 PNA  2. COVID-19 PNA  3. MONICA on CKD 3 in setting of COVID  4. Elevated troponin, demand ischemia   5. Hyperglycemia, DMT2  6. Acute on chronic DVT BLE  7. History of stroke  8. Stage 2 pressure ulcer of the gluteal cleft, POA    Plan  1. Continue to monitor oxygen demand and wean as tolerated. 2. Monitor inflammatory markers, continue dexamethasone, combivent. ID consult. Monitor while off antibiotic   3. Continue xarelto  4. POC glucose, SSI, lantus   5. Nephrology to follow up today. Monitor renal function    6. Patient is alert and oriented x4 and of sound mind to update family. when asked if he wanted me to update wife, patient stated yes. Updated wife, Demetrius Dubose [phone].  Answered questions to the best of my abilities.      Additional Notes:      Case discussed with:  [x]Patient  [x]Family  [x]Nursing  []Case Management  DVT Prophylaxis:  []Lovenox  []Hep SQ  []SCDs  []Coumadin   []On Heparin gtt    Objective:   VS:   Visit Vitals  /82   Pulse 80   Temp 98 °F (36.7 °C)   Resp 18   Ht 5' 11\" (1.803 m)   Wt 108.9 kg (240 lb)   SpO2 94%   BMI 33.47 kg/m²      Tmax/24hrs: Temp (24hrs), Av.1 °F (36.7 °C), Min:97.9 °F (36.6 °C), Max:98.3 °F (36.8 °C)  No intake or output data in the 24 hours ending 22 1438    General:  Alert, NAD  Cardiovascular:  RRR  Pulmonary:  LSC throughout; respiratory effort WNL  GI:  +BS in all four quadrants, soft, non-tender  Extremities: right side weakness No edema; 2+ dorsalis pedis pulses bilaterally  Neuro: alert and oriented x4         Labs:    Recent Results (from the past 24 hour(s))   GLUCOSE, POC    Collection Time: 01/16/22  4:11 PM   Result Value Ref Range    Glucose (POC) 295 (H) 70 - 110 mg/dL   GLUCOSE, POC    Collection Time: 01/16/22  9:40 PM   Result Value Ref Range    Glucose (POC) 299 (H) 70 - 110 mg/dL   RENAL FUNCTION PANEL    Collection Time: 01/17/22  2:05 AM   Result Value Ref Range    Sodium 135 (L) 136 - 145 mmol/L    Potassium 4.9 3.5 - 5.5 mmol/L    Chloride 104 100 - 111 mmol/L    CO2 26 21 - 32 mmol/L    Anion gap 5 3.0 - 18 mmol/L    Glucose 232 (H) 74 - 99 mg/dL    BUN 49 (H) 7.0 - 18 MG/DL    Creatinine 1.83 (H) 0.6 - 1.3 MG/DL    BUN/Creatinine ratio 27 (H) 12 - 20      GFR est AA 48 (L) >60 ml/min/1.73m2    GFR est non-AA 39 (L) >60 ml/min/1.73m2    Calcium 9.0 8.5 - 10.1 MG/DL    Phosphorus 5.1 (H) 2.5 - 4.9 MG/DL    Albumin 2.3 (L) 3.4 - 5.0 g/dL   C REACTIVE PROTEIN, QT    Collection Time: 01/17/22  2:05 AM   Result Value Ref Range    C-Reactive protein 4.5 (H) 0 - 0.3 mg/dL   CBC W/O DIFF    Collection Time: 01/17/22  2:05 AM   Result Value Ref Range    WBC 10.2 4.6 - 13.2 K/uL    RBC 4.60 4.35 - 5.65 M/uL    HGB 12.7 (L) 13.0 - 16.0 g/dL    HCT 40.0 36.0 - 48.0 %    MCV 87.0 78.0 - 100.0 FL    MCH 27.6 24.0 - 34.0 PG    MCHC 31.8 31.0 - 37.0 g/dL    RDW 13.5 11.6 - 14.5 %    PLATELET 752 291 - 200 K/uL    MPV 12.4 (H) 9.2 - 11.8 FL    NRBC 0.0 0  WBC    ABSOLUTE NRBC 0.00 0.00 - 0.01 K/uL   GLUCOSE, POC    Collection Time: 01/17/22  7:18 AM   Result Value Ref Range    Glucose (POC) 161 (H) 70 - 110 mg/dL   GLUCOSE, POC    Collection Time: 01/17/22 12:10 PM   Result Value Ref Range    Glucose (POC) 199 (H) 70 - 110 mg/dL       Signed By: Elana Morgan NP     January 17, 2022

## 2022-01-17 NOTE — ROUTINE PROCESS
Bedside and Verbal shift change report given to BETTINA RN   (oncoming nurse) by Adam Del Toro RN (offgoing nurse). Report given with SBAR, Kardex, Intake/Output, MAR, Accordion and Recent Results.

## 2022-01-17 NOTE — PROGRESS NOTES
Reason for Admission:  COVID [U07.1]  Pneumonia due to COVID-19 virus [U07.1, J12.82]                 RUR Score:    11            Plan for utilizing home health:    no                      Likelihood of Readmission:   LOW                         Transition of Care Plan:              Initial assessment completed with patient. Cognitive status of patient: oriented to time, place, person and situation. Face sheet information confirmed:  yes. The patient designates his wife to participate in his discharge plan and to receive any needed information. This patient lives in a single family home with patient, daughter and wife. Patient is able to navigate steps as needed. Prior to hospitalization, patient was considered to be independent with ADLs/IADLS : yes . Patient has a current ACP document on file: no      Healthcare Decision Maker:     Click here to complete [address] including selection of the Healthcare Decision Maker Relationship (ie \"Primary\")    The patient and wife will be available to transport patient home upon discharge. The patient already has Stef Areola, available in the home. Patient is not currently active with home health. Patient has not stayed in a skilled nursing facility or rehab. This patient is on dialysis :no    Currently, the discharge plan is Home. The patient states that he can obtain his medications from the pharmacy, and take his medications as directed. Patient's current insurance is Medicare and Medicaid       Care Management Interventions  PCP Verified by CM:  Yes  Mode of Transport at Discharge: Self  Support Systems: Spouse/Significant Other,Child(flora)  Confirm Follow Up Transport: Family  The Plan for Transition of Care is Related to the Following Treatment Goals : Home  The Patient and/or Patient Representative was Provided with a Choice of Provider and Agrees with the Discharge Plan?: Yes  Freedom of Choice List was Provided with Basic Dialogue that Supports the Patient's Individualized Plan of Care/Goals, Treatment Preferences and Shares the Quality Data Associated with the Providers?: Yes  Discharge Location  Discharge Placement: Home    Spoke with patient, states he has had the COVID vaccine.     Pedro Pablo Rae RN

## 2022-01-17 NOTE — PROGRESS NOTES
Physician Progress Note      PATIENT:               Fabrizio Mata  CSN #:                  [id number]  :                       1970  ADMIT DATE:       2022 8:21 PM  DISCH DATE:  RESPONDING  PROVIDER #:        ANEESH Sánchez MD          QUERY TEXT:    Patient admitted with COVID-19 infection with COVID-19 pneumonia . Per [facility] RN 22 , noted to also have pressure ulcer of Gluteal cleft. If possible, please document in progress notes and discharge summary the location, present on admission status and stage of the pressure ulcer: The medical record reflects the following:  Risk Factors: BMI 33.4 on restraints COVID-19 infection with COVID-19 pneumonia Acute hypoxic respiratory failure Acute, bilateral LE DVTs    Clinical Indicators: Per  care RN Gluteal cleft, stage 2 pressure injury, 6x3x0.1cm, pink base, POA. Treatment: Unit nursing staff to cleanse wound with wound spray from par room, apply Opticell AG gelling fiber dressing, rope/ribbon or 4x5 rectangle, cut dressing to fit, cover with Optifoam Gentle Silicone dressing, change every 48hrs & prn soilage. Topical prevention & treatment orders per nursing unit skin care protocol.     Stage 1:  Non-blanchable erythema of intact skin  Stage 2:  Abrasion, Blister, Partial-thickness skin loss, with exposed dermis  Stage 3:  Full-thickness skin loss with damage or necrosis of subcutaneous tissue  Stage 4:  Full-thickness skin & soft tissue loss through to underlying muscle, tendon or bone  Unstageable: Obscured full-thickness skin & tissue loss  Thank you  [email or web address]  Options provided:  -- Stage 2 Pressure Ulcer of Gluteal cleft present on admission  -- Other - I will add my own diagnosis  -- Disagree - Not applicable / Not valid  -- Disagree - Clinically unable to determine / Unknown  -- Refer to Clinical Documentation Reviewer    PROVIDER RESPONSE TEXT:    This patient has a stage 2 pressure ulcer of the Gluteal cleft which was present on admission. Query created by:  Florinda Poole on 1/13/2022 4:02 PM      Electronically signed by:  [facility] MD Kiley Bragg MD 1/17/2022 7:13 AM

## 2022-01-18 LAB
ALBUMIN SERPL-MCNC: 2.3 G/DL (ref 3.4–5)
ANION GAP SERPL CALC-SCNC: 9 MMOL/L (ref 3–18)
BUN SERPL-MCNC: 45 MG/DL (ref 7–18)
BUN/CREAT SERPL: 31 (ref 12–20)
CALCIUM SERPL-MCNC: 8.6 MG/DL (ref 8.5–10.1)
CHLORIDE SERPL-SCNC: 104 MMOL/L (ref 100–111)
CO2 SERPL-SCNC: 21 MMOL/L (ref 21–32)
CREAT SERPL-MCNC: 1.46 MG/DL (ref 0.6–1.3)
ERYTHROCYTE [DISTWIDTH] IN BLOOD BY AUTOMATED COUNT: 13.1 % (ref 11.6–14.5)
GLUCOSE BLD STRIP.AUTO-MCNC: 111 MG/DL (ref 70–110)
GLUCOSE BLD STRIP.AUTO-MCNC: 237 MG/DL (ref 70–110)
GLUCOSE BLD STRIP.AUTO-MCNC: 298 MG/DL (ref 70–110)
GLUCOSE BLD STRIP.AUTO-MCNC: 398 MG/DL (ref 70–110)
GLUCOSE SERPL-MCNC: 196 MG/DL (ref 74–99)
HCT VFR BLD AUTO: 38.1 % (ref 36–48)
HGB BLD-MCNC: 12.9 G/DL (ref 13–16)
MCH RBC QN AUTO: 29.5 PG (ref 24–34)
MCHC RBC AUTO-ENTMCNC: 33.9 G/DL (ref 31–37)
MCV RBC AUTO: 87 FL (ref 78–100)
NRBC # BLD: 0 K/UL (ref 0–0.01)
NRBC BLD-RTO: 0 PER 100 WBC
PHOSPHATE SERPL-MCNC: 4.5 MG/DL (ref 2.5–4.9)
PLATELET # BLD AUTO: 238 K/UL (ref 135–420)
PMV BLD AUTO: 12.8 FL (ref 9.2–11.8)
POTASSIUM SERPL-SCNC: 4.6 MMOL/L (ref 3.5–5.5)
RBC # BLD AUTO: 4.38 M/UL (ref 4.35–5.65)
SODIUM SERPL-SCNC: 134 MMOL/L (ref 136–145)
WBC # BLD AUTO: 9.8 K/UL (ref 4.6–13.2)

## 2022-01-18 PROCEDURE — 74011636637 HC RX REV CODE- 636/637: Performed by: INTERNAL MEDICINE

## 2022-01-18 PROCEDURE — 2709999900 HC NON-CHARGEABLE SUPPLY

## 2022-01-18 PROCEDURE — 74011250637 HC RX REV CODE- 250/637: Performed by: FAMILY MEDICINE

## 2022-01-18 PROCEDURE — 74011250636 HC RX REV CODE- 250/636: Performed by: INTERNAL MEDICINE

## 2022-01-18 PROCEDURE — 74011000250 HC RX REV CODE- 250: Performed by: HOSPITALIST

## 2022-01-18 PROCEDURE — 97535 SELF CARE MNGMENT TRAINING: CPT

## 2022-01-18 PROCEDURE — APPSS45 APP SPLIT SHARED TIME 31-45 MINUTES: Performed by: NURSE PRACTITIONER

## 2022-01-18 PROCEDURE — 97530 THERAPEUTIC ACTIVITIES: CPT

## 2022-01-18 PROCEDURE — 36415 COLL VENOUS BLD VENIPUNCTURE: CPT

## 2022-01-18 PROCEDURE — 82962 GLUCOSE BLOOD TEST: CPT

## 2022-01-18 PROCEDURE — 65660000000 HC RM CCU STEPDOWN

## 2022-01-18 PROCEDURE — 80069 RENAL FUNCTION PANEL: CPT

## 2022-01-18 PROCEDURE — 97161 PT EVAL LOW COMPLEX 20 MIN: CPT

## 2022-01-18 PROCEDURE — 77010033711 HC HIGH FLOW OXYGEN

## 2022-01-18 PROCEDURE — 74011250637 HC RX REV CODE- 250/637: Performed by: STUDENT IN AN ORGANIZED HEALTH CARE EDUCATION/TRAINING PROGRAM

## 2022-01-18 PROCEDURE — 99232 SBSQ HOSP IP/OBS MODERATE 35: CPT | Performed by: INTERNAL MEDICINE

## 2022-01-18 PROCEDURE — 85027 COMPLETE CBC AUTOMATED: CPT

## 2022-01-18 PROCEDURE — 97166 OT EVAL MOD COMPLEX 45 MIN: CPT

## 2022-01-18 PROCEDURE — 74011636637 HC RX REV CODE- 636/637: Performed by: FAMILY MEDICINE

## 2022-01-18 PROCEDURE — 74011250637 HC RX REV CODE- 250/637: Performed by: INTERNAL MEDICINE

## 2022-01-18 RX ADMIN — RIVAROXABAN 15 MG: 15 TABLET, FILM COATED ORAL at 08:04

## 2022-01-18 RX ADMIN — Medication 5000 UNITS: at 08:04

## 2022-01-18 RX ADMIN — RIVAROXABAN 15 MG: 15 TABLET, FILM COATED ORAL at 18:18

## 2022-01-18 RX ADMIN — Medication 35 UNITS: at 08:03

## 2022-01-18 RX ADMIN — SODIUM CHLORIDE, PRESERVATIVE FREE 10 ML: 5 INJECTION INTRAVENOUS at 08:08

## 2022-01-18 RX ADMIN — SODIUM CHLORIDE, PRESERVATIVE FREE 10 ML: 5 INJECTION INTRAVENOUS at 06:25

## 2022-01-18 RX ADMIN — AMLODIPINE BESYLATE 10 MG: 10 TABLET ORAL at 08:04

## 2022-01-18 RX ADMIN — SODIUM CHLORIDE, PRESERVATIVE FREE 10 ML: 5 INJECTION INTRAVENOUS at 14:00

## 2022-01-18 RX ADMIN — IPRATROPIUM BROMIDE AND ALBUTEROL 1 PUFF: 20; 100 SPRAY, METERED RESPIRATORY (INHALATION) at 20:00

## 2022-01-18 RX ADMIN — FAMOTIDINE 20 MG: 20 TABLET ORAL at 08:04

## 2022-01-18 RX ADMIN — Medication 5 MG: at 21:30

## 2022-01-18 RX ADMIN — Medication 15 UNITS: at 21:30

## 2022-01-18 RX ADMIN — IPRATROPIUM BROMIDE AND ALBUTEROL 1 PUFF: 20; 100 SPRAY, METERED RESPIRATORY (INHALATION) at 12:00

## 2022-01-18 RX ADMIN — DEXAMETHASONE SODIUM PHOSPHATE 10 MG: 4 INJECTION, SOLUTION INTRA-ARTICULAR; INTRALESIONAL; INTRAMUSCULAR; INTRAVENOUS; SOFT TISSUE at 08:04

## 2022-01-18 RX ADMIN — Medication 9 UNITS: at 18:17

## 2022-01-18 RX ADMIN — IPRATROPIUM BROMIDE AND ALBUTEROL 1 PUFF: 20; 100 SPRAY, METERED RESPIRATORY (INHALATION) at 16:00

## 2022-01-18 RX ADMIN — Medication 6 UNITS: at 12:19

## 2022-01-18 NOTE — PROGRESS NOTES
Problem: Diabetes Self-Management  Goal: *Disease process and treatment process  Description: Define diabetes and identify own type of diabetes; list 3 options for treating diabetes. Outcome: Progressing Towards Goal  Goal: *Incorporating nutritional management into lifestyle  Description: Describe effect of type, amount and timing of food on blood glucose; list 3 methods for planning meals. Outcome: Progressing Towards Goal  Goal: *Incorporating physical activity into lifestyle  Description: State effect of exercise on blood glucose levels. Outcome: Progressing Towards Goal  Goal: *Developing strategies to promote health/change behavior  Description: Define the ABC's of diabetes; identify appropriate screenings, schedule and personal plan for screenings. Outcome: Progressing Towards Goal  Goal: *Using medications safely  Description: State effect of diabetes medications on diabetes; name diabetes medication taking, action and side effects. Outcome: Progressing Towards Goal  Goal: *Monitoring blood glucose, interpreting and using results  Description: Identify recommended blood glucose targets  and personal targets. Outcome: Progressing Towards Goal  Goal: *Prevention, detection, treatment of acute complications  Description: List symptoms of hyper- and hypoglycemia; describe how to treat low blood sugar and actions for lowering  high blood glucose level. Outcome: Progressing Towards Goal  Goal: *Prevention, detection and treatment of chronic complications  Description: Define the natural course of diabetes and describe the relationship of blood glucose levels to long term complications of diabetes.   Outcome: Progressing Towards Goal  Goal: *Developing strategies to address psychosocial issues  Description: Describe feelings about living with diabetes; identify support needed and support network  Outcome: Progressing Towards Goal  Goal: *Insulin pump training  Outcome: Progressing Towards Goal  Goal: *Sick day guidelines  Outcome: Progressing Towards Goal  Goal: *Patient Specific Goal (EDIT GOAL, INSERT TEXT)  Outcome: Progressing Towards Goal     Problem: Patient Education: Go to Patient Education Activity  Goal: Patient/Family Education  Outcome: Progressing Towards Goal     Problem: Airway Clearance - Ineffective  Goal: Achieve or maintain patent airway  Outcome: Progressing Towards Goal     Problem: Gas Exchange - Impaired  Goal: Absence of hypoxia  Outcome: Progressing Towards Goal  Goal: Promote optimal lung function  Outcome: Progressing Towards Goal     Problem: Breathing Pattern - Ineffective  Goal: Ability to achieve and maintain a regular respiratory rate  Outcome: Progressing Towards Goal     Problem:  Body Temperature -  Risk of, Imbalanced  Goal: Ability to maintain a body temperature within defined limits  Outcome: Progressing Towards Goal  Goal: Will regain or maintain usual level of consciousness  Outcome: Progressing Towards Goal  Goal: Complications related to the disease process, condition or treatment will be avoided or minimized  Outcome: Progressing Towards Goal     Problem: Isolation Precautions - Risk of Spread of Infection  Goal: Prevent transmission of infectious organism to others  Outcome: Progressing Towards Goal     Problem: Nutrition Deficits  Goal: Optimize nutrtional status  Outcome: Progressing Towards Goal     Problem: Risk for Fluid Volume Deficit  Goal: Maintain normal heart rhythm  Outcome: Progressing Towards Goal  Goal: Maintain absence of muscle cramping  Outcome: Progressing Towards Goal  Goal: Maintain normal serum potassium, sodium, calcium, phosphorus, and pH  Outcome: Progressing Towards Goal     Problem: Loneliness or Risk for Loneliness  Goal: Demonstrate positive use of time alone when socialization is not possible  Outcome: Progressing Towards Goal     Problem: Fatigue  Goal: Verbalize increase energy and improved vitality  Outcome: Progressing Towards Goal Problem: Patient Education: Go to Patient Education Activity  Goal: Patient/Family Education  Outcome: Progressing Towards Goal     Problem: Pressure Injury - Risk of  Goal: *Prevention of pressure injury  Description: Document Dario Scale and appropriate interventions in the flowsheet. Outcome: Progressing Towards Goal  Note: Pressure Injury Interventions:  Sensory Interventions: Assess changes in LOC    Moisture Interventions: Absorbent underpads    Activity Interventions: Pressure redistribution bed/mattress(bed type)    Mobility Interventions: HOB 30 degrees or less    Nutrition Interventions: Document food/fluid/supplement intake    Friction and Shear Interventions: Minimize layers                Problem: Patient Education: Go to Patient Education Activity  Goal: Patient/Family Education  Outcome: Progressing Towards Goal     Problem: Falls - Risk of  Goal: *Absence of Falls  Description: Document Mariel Fall Risk and appropriate interventions in the flowsheet.   Outcome: Progressing Towards Goal  Note: Fall Risk Interventions:  Mobility Interventions: Bed/chair exit alarm,OT consult for ADLs,Patient to call before getting OOB,PT Consult for mobility concerns    Mentation Interventions: Bed/chair exit alarm    Medication Interventions: Bed/chair exit alarm    Elimination Interventions: Call light in reach,Bed/chair exit alarm              Problem: Patient Education: Go to Patient Education Activity  Goal: Patient/Family Education  Outcome: Progressing Towards Goal

## 2022-01-18 NOTE — PROGRESS NOTES
In Patient Progress note    Admit Date: 1/9/2022    Impression:   #1 Acute kidney injury, baseline creatinine 2019 was 1.5, no labs available since then. --> improved ,   d/t prerenal azotemia Renal functions close to baseline, CT abd with no hydro   #2. Acute hypoxic respiratory failure secondary to COVID-19 pneumonia  #3 COVID-19 pneumonia  #4 hypertension  #5 type 2 diabetes  #6 elevated troponins  #7 transaminitis  #8 hypoalbuminemia  #9 echocardiogram with normal EF but concentric hypertrophy likely from hypertension     Plan:  #1 strict ins and outs, daily weights  #2 encourage po intake   #3 wean O2 as tolerated  #4 follow ID recommendations  #5 avoid IV contrast nephrotoxins  #6 renally dose medications for EGFR of less than 30     Please call with questions,     Tracie Lainez MD FASN  Cell [phone]  Pager: [phone]      Subjective:     - No acute over night events. - respiratory - HFNC  - hemodynamics - stable, no pressrs  - UOP-better  - Nutrition -poor     Objective:     Visit Vitals  /75   Pulse 87   Temp 97.7 °F (36.5 °C)   Resp 19   Ht 5' 11\" (1.803 m)   Wt 98.4 kg (217 lb)   SpO2 96%   BMI 30.27 kg/m²         Intake/Output Summary (Last 24 hours) at 1/18/2022 1653  Last data filed at 1/18/2022 1227  Gross per 24 hour   Intake 480 ml   Output --   Net 480 ml       Physical Exam:          HFNC   Mild distress   HEENT: mmm  Lungs: alicja coarse BS   Cardiovascular system: S1, S2, regular rate and rhythm. Abdomen: soft, non tender, non distended. Extremities: no edema   Integumentary: skin is grossly intact. Neurologic: Alert, oriented time three.       Data Review:    Recent Labs     01/18/22  0130   WBC 9.8   RBC 4.38   HCT 38.1   MCV 87.0   MCH 29.5   MCHC 33.9   RDW 13.1     Recent Labs     01/18/22  0130 01/17/22  0205 01/16/22  0345   BUN 45* 49* 52*   CREA 1.46* 1.83* 1.66*   CA 8.6 9.0 8.5   ALB 2.3* 2.3* 2.5*   K 4.6 4.9 4.3   * 135* 139    104 108   CO2 21 26 23 PHOS 4.5 5.1* 3.4   * 232* 124*       Jie Lewis MD

## 2022-01-18 NOTE — PROGRESS NOTES
Nutrition Assessment (Disaster Mode)    Type and Reason for Visit: Initial,Wound,RD nutrition re-screen/LOS    Nutrition Recommendations/Plan:   - Add supplement: Glucerna Shake BID, Deshawn BID  - Continue all other nutrition interventions. Encourage/ monitor po intake of meals and supplements. Malnutrition Assessment:  Malnutrition Status: At risk for malnutrition (specify) (fair po intake since admission)    Nutrition Assessment:   Nutrition Assessment: Pt reported fair appetite and meal intake since admission; eating ~50% of meals. Tolerating diet. Referral received for pressure injury. Pt agreeable to nutrition and protein supplements. Is covid positive    Nutrition Related Findings: BM 1/16. No edema. Pertinent meds: vitamin D3, steroid, SSI before meals and nightly, 35 units lantus. Nutrition History and Allergies: Past medical hx:  CVA, HTN, DM type 2. Pt reported good appetite/ meal intake PTA; eats 3 meals daily. Pt is unsure of his UBW or weight trends PTA, but denied experiencing/ noticing any recent changes in weight PTA. Per chart hx, noted pt weighing 250 lb on 3/24/2014,   240 lb on 1/18/2019,    217 lb on 1/18/2022;   Net wt loss of 33 lb, 13.2% x 8 years PTA per chart hx. Food allergies:  Grapes  Cultural/Hinduism/Ethnic Food Preference(s): None     Total Energy Requirements (kcals/day): [id number] Energy Requirements Based On: Melanie Price (x1-1.3) Weight Used for Energy Requirements: Current (98.4 kg)  Total Protein Requirements (g/day): 118-138 Weight Used for Protein Requirements: Current (x1.2-1.4)  Total Fluid Requirements (ml/day): 9370-7907 Method Used for Fluid Requirements: 1 ml/kcal    Current Nutrition Therapies:  ADULT DIET Regular; 4 carb choices (60 gm/meal); Low Fat/Low Chol/High Fiber/2 gm Na     Anthropometric Measures:  Height: 5' 11\" (180.3 cm) Weight: 98.4 kg (217 lb) Body mass index is 30.27 kg/m².   Admission Body Weight: 240 lb 1.3 oz (pt stated)  Ideal Body Weight (lbs) (Calculated): 172 lbs Ideal Body Weight (Kg) (Calculated): 78 kg % IBW (Calculated): 126.1 %  Usual Body Weight: 108.9 kg (240 lb) (1/18/2019 per chart hx) % Weight Change (Calculated): -9.6            Nutrition Diagnosis:   · Inadequate oral intake related to other (specify),early satiety (decreased appetite) as evidenced by intake 51-75%,intake 26-50%    · Increased nutrient needs related to inadequate protein-energy intake (promotion of wound healing) as evidenced by wounds (pressure injury)     Nutrition Interventions:   Food and/or Nutrient Delivery: Continue current diet,Vitamin supplement,Start oral nutrition supplement  Nutrition Education/Counseling: Education not indicated,No recommendations at this time  Coordination of Nutrition Care: Continue to monitor while inpatient       Goals: PO nutrition intake will meet >75% of patient's estimated nutrition needs within the next 7 days    Nutrition Monitoring and Evaluation: Patient will be monitored per nutrition standards of care. Consult Dietitian if nutrition intervention essential to patient care is needed. Behavioral-Environmental Outcomes: None identified  Food/Nutrient Intake Outcomes: Food and nutrient intake,Supplement intake,Vitamin/mineral intake  Physical Signs/Symptoms Outcomes: Biochemical data,Meal time behavior,Nutrition focused physical findings,Skin    Discharge Planning:  Too soon to determine    Mirian Mullen RD on 1/18/2022 at 1:36 PM  Contact: [phone]    Disaster Mode Active

## 2022-01-18 NOTE — ED NOTES
Report received on patient. His soft wrist restraints are off at present due to eating meal. They are not continued at this time due to order has . Will monitor closely.

## 2022-01-18 NOTE — PROGRESS NOTES
Infectious Disease progress Note        Reason: Severe COVID-19 pneumonia    Current abx Prior abx   Ceftriaxone, azithromycin  1/10/2022-1/11/22      Lines:       Assessment :  46 y.o. male who has a history of hypertension, type 2 diabetes presented to the emergency room on 1/9/22 with shortness of breath. Clinical presentation consistent with acute hypoxic respiratory failure-present on admission due to confirmed COVID-19 pneumonia, severe    Mild elevated procalcitonin-could be due to acute kidney injury. Unable to exclude superimposed bacterial pneumonia with full certainty    Acute kidney injury-likely secondary to volume depletion. Monitor for COVID-19 associated nephropathy/ATN    Elevated [id number] due to  COVID-19 associated hypercoagulability. Evidence of DVT noted on venous duplex 1/10/22    Clinically better. Appeared comfortable on 10L high flow today. +nt confusion- ? Hypoxic encephalopathy versus steroids related- improved mentation  Increasing crp. No worsening hypoxia. Remains stable    Recommendations:    1. Continue Decadron 10 mg iv q 24 hour till 1/19/22. 2.  Hold further abx   3. Therapeutic anticoagulation per primary team  4. Monitor crp, procalcitonin, oxygenation    Will sign off. F/u prn. thanks     Above plan was discussed in details with patient,RN, dr. Olivia Quintero  Please call me if any further questions or concerns. HPI:      Feels better. Denies any chest pain, abdominal pain, diarrhea/constipation. Past Surgical History:   Procedure Laterality Date    HX HIP FRACTURE TX      left    HX ORTHOPAEDIC      hip       Current Discharge Medication List      CONTINUE these medications which have NOT CHANGED    Details   amLODIPine-Olmesartan 10-40 mg tab Take  by mouth. SITagliptin-metFORMIN (JANUMET) 50-1,000 mg per tablet Take 1 Tab by mouth two (2) times daily (with meals).       metoprolol (LOPRESSOR) 50 mg tablet Take 1 Tab by mouth two (2) times a day.  Qty: 60 Tab, Refills: 1      HYDROcodone-acetaminophen (NORCO) 5-325 mg per tablet Take 1 Tab by mouth every four (4) hours as needed for Pain.   Qty: 20 Tab, Refills: 0             Current Facility-Administered Medications   Medication Dose Route Frequency    ipratropium-albuterol (COMBIVENT RESPIMAT) 20 mcg-100 mcg inhalation spray  1 Puff Inhalation Q4H RT    insulin glargine (LANTUS) injection 35 Units  35 Units SubCUTAneous DAILY    hydrALAZINE (APRESOLINE) 20 mg/mL injection 10 mg  10 mg IntraVENous Q6H PRN    amLODIPine (NORVASC) tablet 10 mg  10 mg Oral DAILY    rivaroxaban (XARELTO) tablet 15 mg  15 mg Oral BID WITH MEALS    dexamethasone (DECADRON) 4 mg/mL injection 10 mg  10 mg IntraVENous Q24H    sodium chloride (NS) flush 5-40 mL  5-40 mL IntraVENous Q8H    sodium chloride (NS) flush 5-40 mL  5-40 mL IntraVENous PRN    acetaminophen (TYLENOL) tablet 650 mg  650 mg Oral Q6H PRN    Or    acetaminophen (TYLENOL) suppository 650 mg  650 mg Rectal Q6H PRN    polyethylene glycol (MIRALAX) packet 17 g  17 g Oral DAILY PRN    ondansetron (ZOFRAN ODT) tablet 4 mg  4 mg Oral Q8H PRN    Or    ondansetron (ZOFRAN) injection 4 mg  4 mg IntraVENous Q6H PRN    [Held by provider] albuterol-ipratropium (DUO-NEB) 2.5 MG-0.5 MG/3 ML  3 mL Nebulization Q4H RT    insulin lispro (HUMALOG) injection   SubCUTAneous AC&HS    glucose chewable tablet 16 g  4 Tablet Oral PRN    glucagon (GLUCAGEN) injection 1 mg  1 mg IntraMUSCular PRN    dextrose (D50W) injection syrg 12.5-25 g  25-50 mL IntraVENous PRN    famotidine (PEPCID) tablet 20 mg  20 mg Oral DAILY    melatonin tablet 5 mg  5 mg Oral QHS    cholecalciferol (VITAMIN D3) capsule 5,000 Units  5,000 Units Oral DAILY       Allergies: Grape    Family History   Problem Relation Age of Onset    Stroke Neg Hx     Hypertension Neg Hx     Heart Disease Neg Hx     Diabetes Neg Hx     Cancer Neg Hx      Social History     Socioeconomic History    Marital status:      Spouse name: Not on file    Number of children: Not on file    Years of education: Not on file    Highest education level: Not on file   Occupational History    Not on file   Tobacco Use    Smoking status: Never Smoker    Smokeless tobacco: Never Used   Substance and Sexual Activity    Alcohol use: No    Drug use: No    Sexual activity: Not on file   Other Topics Concern    Not on file   Social History Narrative    Not on file     Social Determinants of Health     Financial Resource Strain:     Difficulty of Paying Living Expenses: Not on file   Food Insecurity:     Worried About Running Out of Food in the Last Year: Not on file    Michael of Food in the Last Year: Not on file   Transportation Needs:     Lack of Transportation (Medical): Not on file    Lack of Transportation (Non-Medical):  Not on file   Physical Activity:     Days of Exercise per Week: Not on file    Minutes of Exercise per Session: Not on file   Stress:     Feeling of Stress : Not on file   Social Connections:     Frequency of Communication with Friends and Family: Not on file    Frequency of Social Gatherings with Friends and Family: Not on file    Attends Qu[employer] Services: Not on file    Active Member of [address] [place] [address] or Organizations: Not on file    Attends Club or Organization Meetings: Not on file    Marital Status: Not on file   Intimate Partner Violence:     Fear of Current or Ex-Partner: Not on file    Emotionally Abused: Not on file    Physically Abused: Not on file    Sexually Abused: Not on file   Housing Stability:     Unable to Pay for Housing in the Last Year: Not on file    Number of [place] in the Last Year: Not on file    Unstable Housing in the Last Year: Not on file     Social History     Tobacco Use   Smoking Status Never Smoker   Smokeless Tobacco Never Used        Temp (24hrs), Av °F (36.7 °C), Min:97.9 °F (36.6 °C), Max:98.2 °F (36.8 °C)    Visit Vitals  /86 (BP 1 Location: Left arm, BP Patient Position: At rest)   Pulse 84   Temp 98.1 °F (36.7 °C)   Resp 20   Ht 5' 11\" (1.803 m)   Wt 108.9 kg (240 lb)   SpO2 92%   BMI 33.47 kg/m²       ROS: 12 point ROS obtained in details. Pertinent positives as mentioned in HPI,   otherwise negative    Physical Exam:    Vitals signs and nursing note reviewed. Constitutional:       Sitting on bed, nonrebreather mask on face, in no apparent distress  HENT:      Head: Normocephalic. Eyes:      Conjunctiva/sclera: Conjunctivae normal.      Neck:      Musculoskeletal: Normal range of motion and neck supple. Cardiovascular:      Rate and Rhythm: Normal rate and regular rhythm on monitor  Chest:      Bilateral chest movements equal.  Auscultation deferred due to Covid positive  Abdominal:      General: There is no distension. Palpations: Abdomen is soft. Tenderness: There is no abdominal tenderness. There is no rebound. Musculoskeletal: Normal range of motion. General: No tenderness. Skin:     General: Skin is warm and dry. Findings: No rash. Neurological:      Mental Status: He is alert and oriented to person, place, and time. Cranial Nerves: No cranial nerve deficit. Motor: No abnormal muscle tone. Coordination: Coordination normal.   Psychiatric:         Behavior: Behavior normal.         Thought Content: Thought content normal.         Judgment: Judgment normal.   Labs: Results:   Chemistry Recent Labs     01/17/22  0205 01/16/22  0345   * 124*   * 139   K 4.9 4.3    108   CO2 26 23   BUN 49* 52*   CREA 1.83* 1.66*   CA 9.0 8.5   AGAP 5 8   BUCR 27* 31*   ALB 2.3* 2.5*      CBC w/Diff Recent Labs     01/17/22  0205 01/16/22  0345   WBC 10.2 10.6   RBC 4.60 4.47   HGB 12.7* 12.7*   HCT 40.0 37.7    246      Microbiology No results for input(s): CULT in the last 72 hours.        RADIOLOGY:    All available imaging studies/reports in [facility] for this admission were reviewed      Disclaimer: Sections of this note are dictated utilizing voice recognition software, which may have resulted in some phonetic based errors in grammar and contents. Even though attempts were made to correct all the mistakes, some may have been missed, and remained in the body of the document. If questions arise, please contact our department.     Dr. Lisa Friedman, Infectious Disease Specialist  [phone]  January 17, 2022  9:00 AM

## 2022-01-18 NOTE — PROGRESS NOTES
Problem: Self Care Deficits Care Plan (Adult)  Goal: *Acute Goals and Plan of Care (Insert Text)  Description: Occupational Therapy Goals  Initiated 1/18/2022 within 7 day(s). 1.  Patient will perform grooming with supervision/set-up. 2.  Patient will perform bathing with minimal assistance/contact guard assist.  3.  Patient will perform upper body dressing and lower body dressing with minimal assistance/contact guard assist.  4.  Patient will perform bedside commode transfers with minimal assistance/contact guard assist using LRAD. 5.  Patient will perform all aspects of toileting with minimal assistance/contact guard assist.  6.  Patient will participate in upper extremity therapeutic exercise/activities with supervision/set-up for 8 minutes. 7.  Patient will utilize energy conservation techniques during functional activities with min verbal cues. Prior Level of Function: pt demonstrating difficulty providing accurate PLOF e.g. use of AD for functional mobility - pt states he used a walker and also states he does not use a walker. Pt indicated that his spouse assists with shower transfer and bathing tasks while seated on shower seat and spouse also assists with dressing tasks. 1/18/2022 1458 by Aurea Vasquez  Outcome: Progressing Towards Goal  OCCUPATIONAL THERAPY EVALUATION    Patient: Kyle Espinoza (67 y.o. male)  Date: 1/18/2022  Primary Diagnosis: COVID [U07.1]  Pneumonia due to COVID-19 virus [U07.1, J12.82]        Precautions:   Fall,Contact,Other (comment) (droplet+)  PLOF: pt demonstrating difficulty providing accurate PLOF e.g. use of AD for functional mobility - pt states he used a walker and also states he does not use a walker. Pt indicated that his spouse assists with shower transfer and bathing tasks while seated on shower seat and spouse also assists with dressing tasks. ASSESSMENT :  Nursing/RN cleared for pt to participate in OT evaluation and tx session. Patient presents lying semi-reclined in bed. Bed mobility: Max A supine to sit edge of bed, noted with urinary incontinence. UB dress and bathe w/ Max A seated edge of  bed. Pt unable to perform STS safely, Min A w/ instructions for scooting while seated on edge of bed towards head of bed in prep for optimal bed positioning. Pt exhibiting emotional lability throughout tx session re: hemiplegic RUE, inability to recall family names-nursing notified. Pt demonstrates SROM RUE WFL and noted minimal movement e.g. wiggling of right hand digits. Bed mobility: Mod A sit -> supine. Patient resting comfortably lying semi reclined in bed, RUE elevated on pillow, pt able to retrieve cup of water with L hand and bring to mouth to drink through straw with Mod I. Call bell within reach & pt verbalized understanding and provided return demonstration to utilize for assist e.g. functional transfers in order to prevent falls, bed alarm intact. Notified CM that pt unable to provide accurate PLOF e.g. DME and AD utilized. Patient will benefit from skilled intervention to address the above impairments.   Patient's rehabilitation potential is considered to be Good  Factors which may influence rehabilitation potential include:   []             None noted  []             Mental ability/status  [x]             Medical condition  []             Home/family situation and support systems  []             Safety awareness  []             Pain tolerance/management  []             Other:      PLAN :  Recommendations and Planned Interventions:   [x]               Self Care Training                  [x]      Therapeutic Activities  [x]               Functional Mobility Training   []      Cognitive Retraining  [x]               Therapeutic Exercises           [x]      Endurance Activities  [x]               Balance Training                    [x]      Neuromuscular Re-Education  []               Visual/Perceptual Training     [x]      Home Safety Training  [x] Patient Education                   [x]      Family Training/Education  []               Other (comment):    Frequency/Duration: Patient will be followed by occupational therapy 3-5 times a week to address goals. Discharge Recommendations: Rehab  Further Equipment Recommendations for Discharge: to be determined as progress is made with therapy     SUBJECTIVE:   Patient stated My wife helps me.     OBJECTIVE DATA SUMMARY:     Past Medical History:   Diagnosis Date    CVA (cerebral vascular accident) ([address]     right-sided deficit    Diabetes ([address]     HTN (hypertension)      Past Surgical History:   Procedure Laterality Date    HX HIP FRACTURE TX      left    HX ORTHOPAEDIC      hip     Barriers to Learning/Limitations: yes;  sensory deficits-vision/hearing/speech and altered mental status (i.e.Sedation, Confusion)  Compensate with: visual, verbal, tactile, kinesthetic cues/model    Home Situation:   Home Situation  Home Environment: Private residence  # Steps to Enter: 4  Rails to Enter: Yes  Wheelchair Ramp: No  One/Two Story Residence: One story  Living Alone: No  Support Systems: Spouse/Significant Other  Patient Expects to be Discharged to[de-identified] [employer]  Current DME Used/Available at Home: [address] Verbal chair  Tub or Shower Type: Shower  []  Right hand dominant   []  Left hand dominant    Cognitive/Behavioral Status:  Neurologic State: Alert  Orientation Level: Oriented to person  Cognition: Follows commands  Safety/Judgement: Fall prevention    Skin: sacral pressure ulcer    Edema: none noted    Vision/Perceptual:  appears intact    Coordination: BUE  Coordination:  (RUE impaired, LUE WFL)  Fine Motor Skills-Upper: Right Impaired;Left Intact    Gross Motor Skills-Upper: Right Impaired;Left Intact    Balance:  Sitting: Impaired; With support  Sitting - Static: Fair (occasional)  Sitting - Dynamic: Fair (occasional)  Standing: Impaired; With support  Standing - Static: Poor  Standing - Dynamic : Poor    Strength: BUE  Strength:  (RUE impaired, LUE WFL)     Tone & Sensation: BUE  Tone:  (RUE Abnormal, LUE intact)  Sensation: Impaired     Range of Motion: BUE  AROM:  (RUE impaired, LUE WFL)  PROM:  (RUE WFL)     Functional Mobility and Transfers for ADLs:  Bed Mobility:     Supine to Sit: Maximum assistance  Sit to Supine: Moderate assistance  Scooting: Minimum assistance    ADL Assessment:   Feeding: Supervision;Setup    Oral Facial Hygiene/Grooming: Stand-by assistance;Setup    Bathing: Maximum assistance    Upper Body Dressing: Maximum assistance    Lower Body Dressing: Total assistance    Toileting: Maximum assistance     ADL Intervention:  Grooming  Position Performed: Seated edge of bed  Washing Face: Stand-by assistance    Upper Body [address]: Maximum assistance    Cognitive Retraining  Safety/Judgement: Fall prevention    Pain:  Pain level pre-treatment: 0/10   Pain level post-treatment: 0/10     Activity Tolerance:   fair  Please refer to the flowsheet for vital signs taken during this treatment. After treatment:   [] Patient left in no apparent distress sitting up in chair  [x] Patient left in no apparent distress in bed  [x] Call bell left within reach  [x] Nursing notified  [] Caregiver present  [x] Bed alarm activated    COMMUNICATION/EDUCATION:   [x] Role of Occupational Therapy in the acute care setting  [x] Home safety education was provided and the patient/caregiver indicated understanding. [x] Patient/family have participated as able in goal setting and plan of care. [x] Patient/family agree to work toward stated goals and plan of care. [] Patient understands intent and goals of therapy, but is neutral about his/her participation. [] Patient is unable to participate in goal setting and plan of care.     Thank you for this referral.  Patrick Halsted  Time Calculation: 27 mins    Eval Complexity: History: MEDIUM Complexity : Expanded review of history including physical, cognitive and psychosocial  history ; Examination: MEDIUM Complexity : 3-5 performance deficits relating to physical, cognitive , or psychosocial skils that result in activity limitations and / or participation restrictions; Decision Making:MEDIUM Complexity : Patient may present with comorbidities that affect occupational performnce.  Miniml to moderate modification of tasks or assistance (eg, physical or verbal ) with assesment(s) is necessary to enable patient to complete evaluation

## 2022-01-18 NOTE — PROGRESS NOTES
[facility] Hospitalist Group  Progress Note    Patient: Doreen Ross Age: 46 y.o. : 1970 MR#: [id number] SSN: [id number]  Date: 2022     Subjective:   Patient is asleep and lying on his side. NC 10L. NAD. I did not wake him     Assessment/Plan:   1. Acute respiratory failure with hypoxia secondary to COVID-19 PNA  2. COVID-19 PNA  3. MONICA on CKD 3 in setting of COVID  4. Elevated troponin, demand ischemia   5. Hyperglycemia, DMT2  6. Acute on chronic DVT BLE  7. History of stroke  8. Stage 2 pressure ulcer of the gluteal cleft, POA    Plan  1. Continue to monitor oxygen demand and wean as tolerated  2. Nephrology recommendations strict I/Os, daily weights, encourage PO intake, follow ID recommendations, wean oxygen as tolerated, avoid IV contrast and nephrotoxins, medications renal dose  3. ID recommendations dexamethasone 10 mg daily, monitor while off abx, anticoagulation, monitor inflammatory markers. ID to sign off  4. Xarelto   5. POC glucose, SSI  6.  Attempted to call wife Kandi Warren [phone], no answer and LM at 65  Wife called back and updated on plan of care and answered questions to the best of my ability.      Additional Notes:      Case discussed with:  [x]Patient  [x]Family  [x]Nursing  []Case Management  DVT Prophylaxis:  []Lovenox  []Hep SQ  []SCDs  []Coumadin   []On Heparin gtt    Objective:   VS:   Visit Vitals  /87 (BP 1 Location: Left upper arm, BP Patient Position: At rest)   Pulse 81   Temp 97.5 °F (36.4 °C)   Resp 20   Ht 5' 11\" (1.803 m)   Wt 98.4 kg (217 lb)   SpO2 93%   BMI 30.27 kg/m²      Tmax/24hrs: Temp (24hrs), Av.9 °F (36.6 °C), Min:97.5 °F (36.4 °C), Max:98.1 °F (36.7 °C)      Intake/Output Summary (Last 24 hours) at 2022 1024  Last data filed at 2022 1729  Gross per 24 hour   Intake 120 ml   Output 50 ml   Net 70 ml       General:  Alert, NAD  Cardiovascular:  RRR  Pulmonary:  LSC throughout; respiratory effort WNL  GI:  +BS in all four quadrants, soft, non-tender  Extremities:  No edema; 2+ dorsalis pedis pulses bilaterally  Neuro: alert and oriented x4      Labs:    Recent Results (from the past 24 hour(s))   GLUCOSE, POC    Collection Time: 01/17/22 12:10 PM   Result Value Ref Range    Glucose (POC) 199 (H) 70 - 110 mg/dL   GLUCOSE, POC    Collection Time: 01/17/22  9:31 PM   Result Value Ref Range    Glucose (POC) 526 (HH) 70 - 110 mg/dL   GLUCOSE, POC    Collection Time: 01/17/22  9:32 PM   Result Value Ref Range    Glucose (POC) 402 (HH) 70 - 110 mg/dL   RENAL FUNCTION PANEL    Collection Time: 01/18/22  1:30 AM   Result Value Ref Range    Sodium 134 (L) 136 - 145 mmol/L    Potassium 4.6 3.5 - 5.5 mmol/L    Chloride 104 100 - 111 mmol/L    CO2 21 21 - 32 mmol/L    Anion gap 9 3.0 - 18 mmol/L    Glucose 196 (H) 74 - 99 mg/dL    BUN 45 (H) 7.0 - 18 MG/DL    Creatinine 1.46 (H) 0.6 - 1.3 MG/DL    BUN/Creatinine ratio 31 (H) 12 - 20      GFR est AA >60 >60 ml/min/1.73m2    GFR est non-AA 51 (L) >60 ml/min/1.73m2    Calcium 8.6 8.5 - 10.1 MG/DL    Phosphorus 4.5 2.5 - 4.9 MG/DL    Albumin 2.3 (L) 3.4 - 5.0 g/dL   CBC W/O DIFF    Collection Time: 01/18/22  1:30 AM   Result Value Ref Range    WBC 9.8 4.6 - 13.2 K/uL    RBC 4.38 4.35 - 5.65 M/uL    HGB 12.9 (L) 13.0 - 16.0 g/dL    HCT 38.1 36.0 - 48.0 %    MCV 87.0 78.0 - 100.0 FL    MCH 29.5 24.0 - 34.0 PG    MCHC 33.9 31.0 - 37.0 g/dL    RDW 13.1 11.6 - 14.5 %    PLATELET 226 566 - 722 K/uL    MPV 12.8 (H) 9.2 - 11.8 FL    NRBC 0.0 0  WBC    ABSOLUTE NRBC 0.00 0.00 - 0.01 K/uL   GLUCOSE, POC    Collection Time: 01/18/22  7:03 AM   Result Value Ref Range    Glucose (POC) 111 (H) 70 - 110 mg/dL       Signed By: Elana Morgan NP     January 18, 2022

## 2022-01-18 NOTE — ROUTINE PROCESS
Bedside and Verbal shift change report given to Salazar Bernabe (oncoming nurse) by Deborah Cadena RN (offgoing nurse). Report given with SBAR, Kardex, Intake/Output, MAR, Accordion and Recent Results.

## 2022-01-18 NOTE — PROGRESS NOTES
Problem: Mobility Impaired (Adult and Pediatric)  Goal: *Acute Goals and Plan of Care (Insert Text)  Description: Physical Therapy Goals  Initiated 1/18/2022 and to be accomplished within 7 day(s)  1. Patient will move from supine to sit and sit to supine , scoot up and down, and roll side to side in bed with minimal assistance/contact guard assist.    2.  Patient will transfer from bed to chair and chair to bed with Sheyla using the least restrictive device. 3.  Patient will perform sit to stand with Sheyla  4. Patient will ambulate with Sheyla  for 20 feet with the least restrictive device. 5.  Patient will ascend/descend 4 stairs with unilateral handrail(s) with minimal assistance/contact guard assist.    PLOF: Pt reporting he ambulates without AD, assist sometimes for bed mobility. Lives with wife in 1 story house with 4 YURIY. Outcome: Progressing Towards Goal  PHYSICAL THERAPY EVALUATION    Patient: Wade Perry (45 y.o. male)  Date: 1/18/2022  Primary Diagnosis: COVID [U07.1]  Pneumonia due to COVID-19 virus [U07.1, J12.82]        Precautions: Fall (droplet plus )    ASSESSMENT :  Pt cleared to participate in PT session, pt received semi-reclined in bed and agreeable to therapy session. Based on the objective data described below, the patient presents with decreased endurance, decreased strength, decreased balance reactions, gait deviations, and decreased independence in functional mobility. Pt completing bed mobility with modA. Pt sitting on EOB with constant support, no active movement of RUE. Pt attempting to stand with modA and increased time. Pt reporting he has a platform walker that he uses sometimes to assist with RUE support. Pt unable to take steps with RLE for side stepping towards HOB. Pt returned to supine with modA. Pt positioned for comfort and educated to call for assist before getting up, pt verbalized understanding. Pt left with all needs met and call bell in reach.  RN notified of position and participation. Bed alarm activated. Pt not safe for transfer to recliner at this time, would benefit from 2 person assist.     Patient will benefit from skilled intervention to address the above impairments. Patient's rehabilitation potential is considered to be Fair  Factors which may influence rehabilitation potential include:   []         None noted  []         Mental ability/status  [x]         Medical condition  []         Home/family situation and support systems  []         Safety awareness  []         Pain tolerance/management  []         Other:      PLAN :  Recommendations and Planned Interventions:   [x]           Bed Mobility Training             []    Neuromuscular Re-Education  [x]           Transfer Training                   []    Orthotic/Prosthetic Training  [x]           Gait Training                          []    Modalities  [x]           Therapeutic Exercises           []    Edema Management/Control  [x]           Therapeutic Activities            [x]    Family Training/Education  [x]           Patient Education  []           Other (comment):    Frequency/Duration: Patient will be followed by physical therapy 1-2 times per day/4-7 days per week to address goals. Discharge Recommendations: Rehab  Further Equipment Recommendations for Discharge: rolling walker with platform, hospital bed, wheelchair      SUBJECTIVE:   Patient stated I walk by myself.     OBJECTIVE DATA SUMMARY:     Past Medical History:   Diagnosis Date    CVA (cerebral vascular accident) [facility] [address]     right-sided deficit    Diabetes ([facility] [address] [facility]     HTN (hypertension)      Past Surgical History:   Procedure Laterality Date    HX HIP FRACTURE TX      left    HX ORTHOPAEDIC      hip     Barriers to Learning/Limitations: yes;  physical  Compensate with: Verbal Cues and Tactile Cues  Home Situation:  Home Situation  Home Environment: Private residence  # Steps to Enter: 4  Rails to Enter: Yes  Wheelchair Ramp: No  One/Two Story Residence: One story  Living Alone: No  Support Systems: Spouse/Significant Other  Patient Expects to be Discharged to[de-identified] House  Current DME Used/Available at Home: Walker, rolling (with platform)  Critical Behavior:  Neurologic State: Alert  Orientation Level: Oriented to person;Oriented to place  Cognition: Follows commands  Safety/Judgement: Awareness of environment; Fall prevention  Psychosocial  Patient Behaviors: Calm; Cooperative  Needs Expressed: Palliative Care  Purposeful Interaction: Yes  Pt Identified Daily Priority: Clinical issues (comment)  Caritas Process: Establish trust;Attend basic human needs  Caring Interventions: Reassure; Therapeutic modalities  Reassure: Therapeutic listening;Caring rounds  Strength:    Strength: Grossly decreased, non-functional (RLE, LLE 4/5 )  Tone & Sensation:   Tone: Abnormal  Sensation: Impaired    Range Of Motion:  AROM: Grossly decreased, non-functional (RLE, LLE WNL )  PROM: Generally decreased, functional (RLE)  Posture:  Posture (WDL): Exceptions to WDL  Posture Assessment:  (R trunk lean )  Functional Mobility:  Bed Mobility:     Supine to Sit: Moderate assistance  Sit to Supine: Moderate assistance (for LE management )  Scooting: Moderate assistance  Transfers:  Sit to Stand: Moderate assistance  Stand to Sit: Minimum assistance  Balance:   Sitting: Impaired; With support  Sitting - Static: Fair (occasional)  Sitting - Dynamic: Fair (occasional) (-)  Standing: Impaired; With support  Standing - Static: Poor  Standing - Dynamic : Poor    Therapeutic Exercises:   Able to perform LAQ with LLE x10 reps, no active movement of RLE. Pain:  Pain level pre-treatment: 0/10   Pain level post-treatment: 0/10     Activity Tolerance:   Poor tolerance     Please refer to the flowsheet for vital signs taken during this treatment.   After treatment:   []         Patient left in no apparent distress sitting up in chair  [x]         Patient left in no apparent distress in bed  [x]         Call bell left within reach  [x]         Nursing notified  []         Caregiver present  [x]         Bed alarm activated  []         SCDs applied    COMMUNICATION/EDUCATION:   [x]         Role of Physical Therapy in the acute care setting. [x]         Fall prevention education was provided and the patient/caregiver indicated understanding. [x]         Patient/family have participated as able in goal setting and plan of care. [x]         Patient/family agree to work toward stated goals and plan of care. []         Patient understands intent and goals of therapy, but is neutral about his/her participation. []         Patient is unable to participate in goal setting/plan of care: ongoing with therapy staff.  []         Other:     Thank you for this referral.  Steve Friedman, PT   Time Calculation: 29 mins      Eval Complexity: History: MEDIUM  Complexity : 1-2 comorbidities / personal factors will impact the outcome/ POC Exam:LOW Complexity : 1-2 Standardized tests and measures addressing body structure, function, activity limitation and / or participation in recreation  Presentation: LOW Complexity : Stable, uncomplicated  Clinical Decision Making:Low Complexity low Overall Complexity:LOW

## 2022-01-18 NOTE — ROUTINE PROCESS
Bedside shift change report given to Lora Mead RN (oncoming nurse) by Papi Bowles RN (offgoing nurse).  Report included the following information SBAR, Kardex, MAR and Cardiac Rhythm NSr.

## 2022-01-19 ENCOUNTER — APPOINTMENT (OUTPATIENT)
Dept: GENERAL RADIOLOGY | Age: 52
DRG: 177 | End: 2022-01-19
Attending: INTERNAL MEDICINE
Payer: MEDICARE

## 2022-01-19 LAB
ANION GAP SERPL CALC-SCNC: 6 MMOL/L (ref 3–18)
BUN SERPL-MCNC: 56 MG/DL (ref 7–18)
BUN/CREAT SERPL: 31 (ref 12–20)
CALCIUM SERPL-MCNC: 9.2 MG/DL (ref 8.5–10.1)
CHLORIDE SERPL-SCNC: 103 MMOL/L (ref 100–111)
CO2 SERPL-SCNC: 25 MMOL/L (ref 21–32)
CREAT SERPL-MCNC: 1.8 MG/DL (ref 0.6–1.3)
ERYTHROCYTE [DISTWIDTH] IN BLOOD BY AUTOMATED COUNT: 13.3 % (ref 11.6–14.5)
GLUCOSE BLD STRIP.AUTO-MCNC: 162 MG/DL (ref 70–110)
GLUCOSE BLD STRIP.AUTO-MCNC: 218 MG/DL (ref 70–110)
GLUCOSE BLD STRIP.AUTO-MCNC: 265 MG/DL (ref 70–110)
GLUCOSE BLD STRIP.AUTO-MCNC: 356 MG/DL (ref 70–110)
GLUCOSE SERPL-MCNC: 168 MG/DL (ref 74–99)
HCT VFR BLD AUTO: 41.3 % (ref 36–48)
HGB BLD-MCNC: 13.2 G/DL (ref 13–16)
MCH RBC QN AUTO: 27.5 PG (ref 24–34)
MCHC RBC AUTO-ENTMCNC: 32 G/DL (ref 31–37)
MCV RBC AUTO: 86 FL (ref 78–100)
NRBC # BLD: 0 K/UL (ref 0–0.01)
NRBC BLD-RTO: 0 PER 100 WBC
PLATELET # BLD AUTO: 231 K/UL (ref 135–420)
PMV BLD AUTO: 12.6 FL (ref 9.2–11.8)
POTASSIUM SERPL-SCNC: 4.8 MMOL/L (ref 3.5–5.5)
RBC # BLD AUTO: 4.8 M/UL (ref 4.35–5.65)
SODIUM SERPL-SCNC: 134 MMOL/L (ref 136–145)
WBC # BLD AUTO: 14 K/UL (ref 4.6–13.2)

## 2022-01-19 PROCEDURE — 65660000000 HC RM CCU STEPDOWN

## 2022-01-19 PROCEDURE — 85027 COMPLETE CBC AUTOMATED: CPT

## 2022-01-19 PROCEDURE — 99232 SBSQ HOSP IP/OBS MODERATE 35: CPT | Performed by: INTERNAL MEDICINE

## 2022-01-19 PROCEDURE — 74011636637 HC RX REV CODE- 636/637: Performed by: FAMILY MEDICINE

## 2022-01-19 PROCEDURE — 82962 GLUCOSE BLOOD TEST: CPT

## 2022-01-19 PROCEDURE — 74011250636 HC RX REV CODE- 250/636: Performed by: INTERNAL MEDICINE

## 2022-01-19 PROCEDURE — APPSS45 APP SPLIT SHARED TIME 31-45 MINUTES: Performed by: NURSE PRACTITIONER

## 2022-01-19 PROCEDURE — 2709999900 HC NON-CHARGEABLE SUPPLY

## 2022-01-19 PROCEDURE — 74011250637 HC RX REV CODE- 250/637: Performed by: FAMILY MEDICINE

## 2022-01-19 PROCEDURE — 74011250637 HC RX REV CODE- 250/637: Performed by: STUDENT IN AN ORGANIZED HEALTH CARE EDUCATION/TRAINING PROGRAM

## 2022-01-19 PROCEDURE — 74011250637 HC RX REV CODE- 250/637: Performed by: INTERNAL MEDICINE

## 2022-01-19 PROCEDURE — 74011636637 HC RX REV CODE- 636/637: Performed by: INTERNAL MEDICINE

## 2022-01-19 PROCEDURE — 36415 COLL VENOUS BLD VENIPUNCTURE: CPT

## 2022-01-19 PROCEDURE — 77030040392 HC DRSG OPTIFOAM MDII -A

## 2022-01-19 PROCEDURE — 71045 X-RAY EXAM CHEST 1 VIEW: CPT

## 2022-01-19 PROCEDURE — 77030041076 HC DRSG AG OPTICELL MDII -A

## 2022-01-19 PROCEDURE — 80048 BASIC METABOLIC PNL TOTAL CA: CPT

## 2022-01-19 PROCEDURE — 74011000250 HC RX REV CODE- 250: Performed by: HOSPITALIST

## 2022-01-19 RX ORDER — DEXTROSE MONOHYDRATE 100 MG/ML
125-250 INJECTION, SOLUTION INTRAVENOUS AS NEEDED
Status: DISCONTINUED | OUTPATIENT
Start: 2022-01-19 | End: 2022-02-04 | Stop reason: HOSPADM

## 2022-01-19 RX ORDER — METOPROLOL TARTRATE 25 MG/1
25 TABLET, FILM COATED ORAL EVERY 12 HOURS
Status: DISCONTINUED | OUTPATIENT
Start: 2022-01-19 | End: 2022-01-22

## 2022-01-19 RX ORDER — SODIUM CHLORIDE, SODIUM LACTATE, POTASSIUM CHLORIDE, CALCIUM CHLORIDE 600; 310; 30; 20 MG/100ML; MG/100ML; MG/100ML; MG/100ML
50 INJECTION, SOLUTION INTRAVENOUS CONTINUOUS
Status: DISPENSED | OUTPATIENT
Start: 2022-01-19 | End: 2022-01-19

## 2022-01-19 RX ORDER — SODIUM CHLORIDE, SODIUM LACTATE, POTASSIUM CHLORIDE, CALCIUM CHLORIDE 600; 310; 30; 20 MG/100ML; MG/100ML; MG/100ML; MG/100ML
50 INJECTION, SOLUTION INTRAVENOUS CONTINUOUS
Status: DISCONTINUED | OUTPATIENT
Start: 2022-01-19 | End: 2022-01-19

## 2022-01-19 RX ADMIN — SODIUM CHLORIDE, PRESERVATIVE FREE 10 ML: 5 INJECTION INTRAVENOUS at 21:48

## 2022-01-19 RX ADMIN — RIVAROXABAN 15 MG: 15 TABLET, FILM COATED ORAL at 16:55

## 2022-01-19 RX ADMIN — Medication 5000 UNITS: at 08:36

## 2022-01-19 RX ADMIN — Medication 35 UNITS: at 08:36

## 2022-01-19 RX ADMIN — SODIUM CHLORIDE, SODIUM LACTATE, POTASSIUM CHLORIDE, AND CALCIUM CHLORIDE 50 ML/HR: 600; 310; 30; 20 INJECTION, SOLUTION INTRAVENOUS at 12:49

## 2022-01-19 RX ADMIN — FAMOTIDINE 20 MG: 20 TABLET ORAL at 08:36

## 2022-01-19 RX ADMIN — IPRATROPIUM BROMIDE AND ALBUTEROL 1 PUFF: 20; 100 SPRAY, METERED RESPIRATORY (INHALATION) at 16:56

## 2022-01-19 RX ADMIN — IPRATROPIUM BROMIDE AND ALBUTEROL 1 PUFF: 20; 100 SPRAY, METERED RESPIRATORY (INHALATION) at 08:36

## 2022-01-19 RX ADMIN — Medication 15 UNITS: at 16:55

## 2022-01-19 RX ADMIN — DEXAMETHASONE SODIUM PHOSPHATE 10 MG: 4 INJECTION, SOLUTION INTRA-ARTICULAR; INTRALESIONAL; INTRAMUSCULAR; INTRAVENOUS; SOFT TISSUE at 08:38

## 2022-01-19 RX ADMIN — METOPROLOL TARTRATE 25 MG: 25 TABLET, FILM COATED ORAL at 21:48

## 2022-01-19 RX ADMIN — SODIUM CHLORIDE, PRESERVATIVE FREE 10 ML: 5 INJECTION INTRAVENOUS at 08:42

## 2022-01-19 RX ADMIN — IPRATROPIUM BROMIDE AND ALBUTEROL 1 PUFF: 20; 100 SPRAY, METERED RESPIRATORY (INHALATION) at 12:43

## 2022-01-19 RX ADMIN — AMLODIPINE BESYLATE 10 MG: 10 TABLET ORAL at 08:36

## 2022-01-19 RX ADMIN — Medication 9 UNITS: at 21:54

## 2022-01-19 RX ADMIN — Medication 3 UNITS: at 07:24

## 2022-01-19 RX ADMIN — Medication 6 UNITS: at 12:42

## 2022-01-19 RX ADMIN — RIVAROXABAN 15 MG: 15 TABLET, FILM COATED ORAL at 08:36

## 2022-01-19 RX ADMIN — Medication 5 MG: at 21:48

## 2022-01-19 RX ADMIN — SODIUM CHLORIDE, PRESERVATIVE FREE 10 ML: 5 INJECTION INTRAVENOUS at 17:01

## 2022-01-19 NOTE — ROUTINE PROCESS
Bedside and Verbal shift change report given to [facility] RN (oncoming nurse) by Jacky Alcaraz RN (offgoing nurse). Report given with SBAR, Kardex, Intake/Output, MAR, Accordion and Recent Results.

## 2022-01-19 NOTE — PROGRESS NOTES
[facility] Hospitalist Group  Progress Note    Patient: Arnulfo Gordon Age: 46 y.o. : 1970 MR#: [id number] SSN: [id number]  Date: 2022     Subjective:   Alert and oriented x4. NAD, NC. No complaints this am       Assessment/Plan:   1. Acute respiratory failure with hypoxia secondary to COVID-19 PNA  2. COVID-19 PNA  3. MONICA on CKD 3 in setting of COVID  4. Elevated troponin, demand ischemia   5. Hyperglycemia, DMT2  6. Acute on chronic DVT BLE  7. History of stroke  8. Stage 2 pressure ulcer of the gluteal cleft, POA    Plan  1. Monitor oxygen demand and wean as tolerated. 15L currently  2. Nephrology following and recommendations strict I/Os, daily weights, encourage PO, follow ID recommendations, wean oxygen as tolerated, avoid IV contrast and nephrotoxins, renal dose medications  3. ID following. dexamethsone 10 mg daily treatment completed    4. pepcid and xarelto  5. POC glucose, SSI. lantus  6. PT/OT eval and treat   7.  Wound care for pressure ulcer       Additional Notes:      Case discussed with:  [x]Patient  [x]Family  [x]Nursing  []Case Management  DVT Prophylaxis:  []Lovenox  []Hep SQ  []SCDs  []Coumadin   []On Heparin gtt    Objective:   VS:   Visit Vitals  BP (!) 127/94 (BP 1 Location: Left upper arm)   Pulse (!) 104   Temp 97.7 °F (36.5 °C)   Resp 20   Ht 5' 11\" (1.803 m)   Wt 98.4 kg (217 lb)   SpO2 95%   BMI 30.27 kg/m²      Tmax/24hrs: Temp (24hrs), Av.2 °F (36.8 °C), Min:97.7 °F (36.5 °C), Max:98.6 °F (37 °C)      Intake/Output Summary (Last 24 hours) at 2022 0916  Last data filed at 2022 1227  Gross per 24 hour   Intake 360 ml   Output --   Net 360 ml       General:  Alert, NAD  Cardiovascular:  RRR  Pulmonary:  LSC throughout; respiratory effort WNL  GI:  +BS in all four quadrants, soft, non-tender  Extremities: right side weakness  No edema; 2+ dorsalis pedis pulses bilaterally  Neuro: alert and oriented x4        Labs:    Recent Results (from the past 24 hour(s))   GLUCOSE, POC    Collection Time: 01/18/22 12:10 PM   Result Value Ref Range    Glucose (POC) 237 (H) 70 - 110 mg/dL   GLUCOSE, POC    Collection Time: 01/18/22  4:40 PM   Result Value Ref Range    Glucose (POC) 298 (H) 70 - 110 mg/dL   GLUCOSE, POC    Collection Time: 01/18/22  9:09 PM   Result Value Ref Range    Glucose (POC) 398 (H) 70 - 110 mg/dL   CBC W/O DIFF    Collection Time: 01/19/22  3:07 AM   Result Value Ref Range    WBC 14.0 (H) 4.6 - 13.2 K/uL    RBC 4.80 4.35 - 5.65 M/uL    HGB 13.2 13.0 - 16.0 g/dL    HCT 41.3 36.0 - 48.0 %    MCV 86.0 78.0 - 100.0 FL    MCH 27.5 24.0 - 34.0 PG    MCHC 32.0 31.0 - 37.0 g/dL    RDW 13.3 11.6 - 14.5 %    PLATELET 526 840 - 067 K/uL    MPV 12.6 (H) 9.2 - 11.8 FL    NRBC 0.0 0  WBC    ABSOLUTE NRBC 0.00 0.00 - 0.34 K/uL   METABOLIC PANEL, BASIC    Collection Time: 01/19/22  3:07 AM   Result Value Ref Range    Sodium 134 (L) 136 - 145 mmol/L    Potassium 4.8 3.5 - 5.5 mmol/L    Chloride 103 100 - 111 mmol/L    CO2 25 21 - 32 mmol/L    Anion gap 6 3.0 - 18 mmol/L    Glucose 168 (H) 74 - 99 mg/dL    BUN 56 (H) 7.0 - 18 MG/DL    Creatinine 1.80 (H) 0.6 - 1.3 MG/DL    BUN/Creatinine ratio 31 (H) 12 - 20      GFR est AA 48 (L) >60 ml/min/1.73m2    GFR est non-AA 40 (L) >60 ml/min/1.73m2    Calcium 9.2 8.5 - 10.1 MG/DL   GLUCOSE, POC    Collection Time: 01/19/22  7:04 AM   Result Value Ref Range    Glucose (POC) 162 (H) 70 - 110 mg/dL       Signed By: Gerson Zamora NP     January 19, 2022

## 2022-01-19 NOTE — DIABETES MGMT
Diabetes/ Glycemic Control Plan of Care    Assessment:   COVID-19, steroids, T2DM  Note steroids completed today   Receiving basal and corrective insulin  FBG in target  - Elevated post-prandial  Will continue to monitor off steroids -  Consider a small dose of MTI - 3 units          DX:   1. COVID        Fasting/ Morning blood glucose:   Lab Results   Component Value Date/Time    Glucose 168 (H) 01/19/2022 03:07 AM    Glucose (POC) 162 (H) 01/19/2022 07:04 AM     IV Fluids containing dextrose: none  Steroids:   Completed today     Blood glucose values: Within target range (70-180mg/dL):  no    Current insulin orders:   lantus 35 units daily  Corrective humalog, very insulin resistant, 4 times daily    Total Daily Dose previous 24 hours = 65 units  35 units lantus  30 units humalog    Current A1c:   Lab Results   Component Value Date/Time    Hemoglobin A1c 7.8 (H) 01/10/2022 05:10 AM      equivalent  to ave Blood Glucose of  177 mg/dl for 2-3 months prior to admission  Adequate glycemic control PTA: no    Nutrition/Diet:   Active Orders   Diet    ADULT DIET Regular; 4 carb choices (60 gm/meal); Low Fat/Low Chol/High Fiber/2 gm Na; No Concentrated Sweets      Meal Intake:  Patient Vitals for the past 168 hrs:   % Diet Eaten   01/18/22 1227 76 - 100%   01/17/22 1729 1 - 25%   01/17/22 1245 51 - 75%     Supplement Intake:  No data found. Home diabetes medications:   Key Antihyperglycemic Medications             SITagliptin-metFORMIN (JANUMET) 50-1,000 mg per tablet Take 1 Tab by mouth two (2) times daily (with meals). Plan/Goals:   Blood glucose will be within target of 70 - 180 mg/dl within 72 hours  Reinforce dietary and medication compliance at home.        Education:  [] Refer to Diabetes Education Record                       [x] Education not indicated at this time     Shweta Lanacster MPH RN [address]  Pager [phone]  Office [phone]

## 2022-01-19 NOTE — ROUTINE PROCESS
Bedside shift change report given to Nitish Lauren RN (oncoming nurse) by [address] [place], RN (offgoing nurse). Report included the following information SBAR, Kardex, MAR and Cardiac Rhythm NSR.

## 2022-01-19 NOTE — PROGRESS NOTES
In Patient Progress note    Admit Date: 1/9/2022    Impression:     #1 Acute kidney injury, baseline creatinine 2019 was 1.5, no labs available since   then. --> improved , d/t prerenal azotemia Renal functions close to baseline, CT   abd with no hydro   #2. Acute hypoxic respiratory failure secondary to COVID-19 pneumonia  #3 COVID-19 pneumonia  #4 hypertension  #5 type 2 diabetes  #6 elevated troponins  #7 transaminitis  #8 hypoalbuminemia  #9 echocardiogram with normal EF but concentric hypertrophy likely from hypertension    Worsening hypoxia  CXR with worsening opacities? PNA   Creatinine up to 1.8  CT abd no hydro      Plan:  #1 strict ins and outs, daily weights  #2 encourage po intake   #3 wean O2 as tolerated  #4 follow ID recommendations  #5 avoid IV contrast nephrotoxins  #6 renally dose medications for EGFR of less than 30  #7 no need for IVF , monitor renal panel daily     Please call with questions,     Salazar Klein MD FASN  Cell [phone]  Pager: [phone]      Subjective:     - No acute over night events. - respiratory - HFNC  - hemodynamics - stable, no pressrs  - UOP-better  - Nutrition -poor     Objective:     Visit Vitals  /87   Pulse 82   Temp 97.5 °F (36.4 °C)   Resp 20   Ht 5' 11\" (1.803 m)   Wt 98.4 kg (217 lb)   SpO2 96%   BMI 30.27 kg/m²       No intake or output data in the 24 hours ending 01/19/22 1552    Physical Exam:          HFNC   Mild distress   HEENT: mmm  Lungs: alicja coarse BS   Cardiovascular system: S1, S2, regular rate and rhythm. Abdomen: soft, non tender, non distended. Extremities: no edema   Integumentary: skin is grossly intact. Neurologic: Alert, oriented time three.       Data Review:    Recent Labs     01/19/22  0307   WBC 14.0*   RBC 4.80   HCT 41.3   MCV 86.0   MCH 27.5   MCHC 32.0   RDW 13.3     Recent Labs     01/19/22  0307 01/18/22  0130 01/17/22  0205   BUN 56* 45* 49*   CREA 1.80* 1.46* 1.83*   CA 9.2 8.6 9.0   ALB  --  2.3* 2.3*   K 4.8 4.6 4.9   * 134* 135*    104 104   CO2 25 21 26   PHOS  --  4.5 5.1*   * 196* 232*       Genesis Yancey MD

## 2022-01-20 LAB
ALBUMIN SERPL-MCNC: 2.2 G/DL (ref 3.4–5)
ALBUMIN/GLOB SERPL: 0.5 {RATIO} (ref 0.8–1.7)
ALP SERPL-CCNC: 60 U/L (ref 45–117)
ALT SERPL-CCNC: 57 U/L (ref 16–61)
ANION GAP SERPL CALC-SCNC: 7 MMOL/L (ref 3–18)
AST SERPL-CCNC: 15 U/L (ref 10–38)
BASOPHILS # BLD: 0 K/UL (ref 0–0.1)
BASOPHILS NFR BLD: 0 % (ref 0–2)
BILIRUB SERPL-MCNC: 0.8 MG/DL (ref 0.2–1)
BNP SERPL-MCNC: 248 PG/ML (ref 0–900)
BUN SERPL-MCNC: 44 MG/DL (ref 7–18)
BUN/CREAT SERPL: 31 (ref 12–20)
CALCIUM SERPL-MCNC: 9.1 MG/DL (ref 8.5–10.1)
CHLORIDE SERPL-SCNC: 101 MMOL/L (ref 100–111)
CO2 SERPL-SCNC: 25 MMOL/L (ref 21–32)
CREAT SERPL-MCNC: 1.44 MG/DL (ref 0.6–1.3)
DIFFERENTIAL METHOD BLD: ABNORMAL
EOSINOPHIL # BLD: 0 K/UL (ref 0–0.4)
EOSINOPHIL NFR BLD: 0 % (ref 0–5)
ERYTHROCYTE [DISTWIDTH] IN BLOOD BY AUTOMATED COUNT: 13.8 % (ref 11.6–14.5)
GLOBULIN SER CALC-MCNC: 4.6 G/DL (ref 2–4)
GLUCOSE BLD STRIP.AUTO-MCNC: 134 MG/DL (ref 70–110)
GLUCOSE BLD STRIP.AUTO-MCNC: 190 MG/DL (ref 70–110)
GLUCOSE BLD STRIP.AUTO-MCNC: 200 MG/DL (ref 70–110)
GLUCOSE BLD STRIP.AUTO-MCNC: 274 MG/DL (ref 70–110)
GLUCOSE SERPL-MCNC: 158 MG/DL (ref 74–99)
HCT VFR BLD AUTO: 37.3 % (ref 36–48)
HGB BLD-MCNC: 12.8 G/DL (ref 13–16)
IMM GRANULOCYTES # BLD AUTO: 0.3 K/UL (ref 0–0.04)
IMM GRANULOCYTES NFR BLD AUTO: 2 % (ref 0–0.5)
LYMPHOCYTES # BLD: 0.4 K/UL (ref 0.9–3.6)
LYMPHOCYTES NFR BLD: 4 % (ref 21–52)
MCH RBC QN AUTO: 30.3 PG (ref 24–34)
MCHC RBC AUTO-ENTMCNC: 34.3 G/DL (ref 31–37)
MCV RBC AUTO: 88.2 FL (ref 78–100)
MONOCYTES # BLD: 0.3 K/UL (ref 0.05–1.2)
MONOCYTES NFR BLD: 2 % (ref 3–10)
NEUTS SEG # BLD: 11.1 K/UL (ref 1.8–8)
NEUTS SEG NFR BLD: 92 % (ref 40–73)
NRBC # BLD: 0 K/UL (ref 0–0.01)
NRBC BLD-RTO: 0 PER 100 WBC
PLATELET # BLD AUTO: 239 K/UL (ref 135–420)
PMV BLD AUTO: 12.4 FL (ref 9.2–11.8)
POTASSIUM SERPL-SCNC: 5 MMOL/L (ref 3.5–5.5)
PROCALCITONIN SERPL-MCNC: 0.45 NG/ML
PROT SERPL-MCNC: 6.8 G/DL (ref 6.4–8.2)
RBC # BLD AUTO: 4.23 M/UL (ref 4.35–5.65)
SODIUM SERPL-SCNC: 133 MMOL/L (ref 136–145)
WBC # BLD AUTO: 12.1 K/UL (ref 4.6–13.2)

## 2022-01-20 PROCEDURE — 77030040392 HC DRSG OPTIFOAM MDII -A

## 2022-01-20 PROCEDURE — 82962 GLUCOSE BLOOD TEST: CPT

## 2022-01-20 PROCEDURE — APPSS45 APP SPLIT SHARED TIME 31-45 MINUTES: Performed by: NURSE PRACTITIONER

## 2022-01-20 PROCEDURE — 36415 COLL VENOUS BLD VENIPUNCTURE: CPT

## 2022-01-20 PROCEDURE — 2709999900 HC NON-CHARGEABLE SUPPLY

## 2022-01-20 PROCEDURE — 74011636637 HC RX REV CODE- 636/637: Performed by: INTERNAL MEDICINE

## 2022-01-20 PROCEDURE — 97530 THERAPEUTIC ACTIVITIES: CPT

## 2022-01-20 PROCEDURE — 65660000000 HC RM CCU STEPDOWN

## 2022-01-20 PROCEDURE — 99232 SBSQ HOSP IP/OBS MODERATE 35: CPT | Performed by: INTERNAL MEDICINE

## 2022-01-20 PROCEDURE — 74011250636 HC RX REV CODE- 250/636: Performed by: NURSE PRACTITIONER

## 2022-01-20 PROCEDURE — 83880 ASSAY OF NATRIURETIC PEPTIDE: CPT

## 2022-01-20 PROCEDURE — 85025 COMPLETE CBC W/AUTO DIFF WBC: CPT

## 2022-01-20 PROCEDURE — 97535 SELF CARE MNGMENT TRAINING: CPT

## 2022-01-20 PROCEDURE — 74011250637 HC RX REV CODE- 250/637: Performed by: INTERNAL MEDICINE

## 2022-01-20 PROCEDURE — 74011250637 HC RX REV CODE- 250/637: Performed by: FAMILY MEDICINE

## 2022-01-20 PROCEDURE — 84145 PROCALCITONIN (PCT): CPT

## 2022-01-20 PROCEDURE — 77010033711 HC HIGH FLOW OXYGEN

## 2022-01-20 PROCEDURE — 74011000250 HC RX REV CODE- 250: Performed by: HOSPITALIST

## 2022-01-20 PROCEDURE — 74011250637 HC RX REV CODE- 250/637: Performed by: STUDENT IN AN ORGANIZED HEALTH CARE EDUCATION/TRAINING PROGRAM

## 2022-01-20 PROCEDURE — 94640 AIRWAY INHALATION TREATMENT: CPT

## 2022-01-20 PROCEDURE — 80053 COMPREHEN METABOLIC PANEL: CPT

## 2022-01-20 PROCEDURE — 74011636637 HC RX REV CODE- 636/637: Performed by: FAMILY MEDICINE

## 2022-01-20 RX ORDER — INSULIN GLARGINE 100 [IU]/ML
40 INJECTION, SOLUTION SUBCUTANEOUS DAILY
Status: DISCONTINUED | OUTPATIENT
Start: 2022-01-21 | End: 2022-01-23

## 2022-01-20 RX ORDER — FUROSEMIDE 10 MG/ML
40 INJECTION INTRAMUSCULAR; INTRAVENOUS ONCE
Status: COMPLETED | OUTPATIENT
Start: 2022-01-20 | End: 2022-01-20

## 2022-01-20 RX ADMIN — Medication 5000 UNITS: at 08:22

## 2022-01-20 RX ADMIN — SODIUM CHLORIDE, PRESERVATIVE FREE 10 ML: 5 INJECTION INTRAVENOUS at 21:27

## 2022-01-20 RX ADMIN — Medication 5 MG: at 21:24

## 2022-01-20 RX ADMIN — METOPROLOL TARTRATE 25 MG: 25 TABLET, FILM COATED ORAL at 08:22

## 2022-01-20 RX ADMIN — SODIUM CHLORIDE, PRESERVATIVE FREE 10 ML: 5 INJECTION INTRAVENOUS at 16:41

## 2022-01-20 RX ADMIN — FUROSEMIDE 40 MG: 10 INJECTION, SOLUTION INTRAVENOUS at 16:41

## 2022-01-20 RX ADMIN — IPRATROPIUM BROMIDE AND ALBUTEROL 1 PUFF: 20; 100 SPRAY, METERED RESPIRATORY (INHALATION) at 15:16

## 2022-01-20 RX ADMIN — IPRATROPIUM BROMIDE AND ALBUTEROL 1 PUFF: 20; 100 SPRAY, METERED RESPIRATORY (INHALATION) at 19:27

## 2022-01-20 RX ADMIN — METOPROLOL TARTRATE 25 MG: 25 TABLET, FILM COATED ORAL at 21:23

## 2022-01-20 RX ADMIN — Medication 9 UNITS: at 12:21

## 2022-01-20 RX ADMIN — RIVAROXABAN 15 MG: 15 TABLET, FILM COATED ORAL at 08:22

## 2022-01-20 RX ADMIN — SODIUM CHLORIDE, PRESERVATIVE FREE 10 ML: 5 INJECTION INTRAVENOUS at 06:43

## 2022-01-20 RX ADMIN — AMLODIPINE BESYLATE 10 MG: 10 TABLET ORAL at 08:22

## 2022-01-20 RX ADMIN — Medication 4 UNITS: at 16:41

## 2022-01-20 RX ADMIN — RIVAROXABAN 15 MG: 15 TABLET, FILM COATED ORAL at 16:42

## 2022-01-20 RX ADMIN — IPRATROPIUM BROMIDE AND ALBUTEROL 1 PUFF: 20; 100 SPRAY, METERED RESPIRATORY (INHALATION) at 12:00

## 2022-01-20 RX ADMIN — FAMOTIDINE 20 MG: 20 TABLET ORAL at 08:22

## 2022-01-20 RX ADMIN — Medication 3 UNITS: at 21:25

## 2022-01-20 RX ADMIN — Medication 35 UNITS: at 08:23

## 2022-01-20 RX ADMIN — IPRATROPIUM BROMIDE AND ALBUTEROL 1 PUFF: 20; 100 SPRAY, METERED RESPIRATORY (INHALATION) at 08:00

## 2022-01-20 NOTE — PROGRESS NOTES
Problem: Diabetes Self-Management  Goal: *Disease process and treatment process  Description: Define diabetes and identify own type of diabetes; list 3 options for treating diabetes. Outcome: Progressing Towards Goal  Goal: *Incorporating nutritional management into lifestyle  Description: Describe effect of type, amount and timing of food on blood glucose; list 3 methods for planning meals. Outcome: Progressing Towards Goal     Problem: Airway Clearance - Ineffective  Goal: Achieve or maintain patent airway  Outcome: Progressing Towards Goal     Problem: Gas Exchange - Impaired  Goal: Absence of hypoxia  Outcome: Progressing Towards Goal     Problem: Breathing Pattern - Ineffective  Goal: Ability to achieve and maintain a regular respiratory rate  Outcome: Progressing Towards Goal     Problem: Risk for Fluid Volume Deficit  Goal: Maintain normal heart rhythm  Outcome: Progressing Towards Goal     Problem: Falls - Risk of  Goal: *Absence of Falls  Description: Document Mariel Fall Risk and appropriate interventions in the flowsheet.   Outcome: Progressing Towards Goal  Note: Fall Risk Interventions:  Mobility Interventions: Assess mobility with egress test,OT consult for ADLs,Patient to call before getting OOB,PT Consult for mobility concerns,PT Consult for assist device competence,Strengthening exercises (ROM-active/passive)    Mentation Interventions: Adequate sleep, hydration, pain control    Medication Interventions: Bed/chair exit alarm,Patient to call before getting OOB    Elimination Interventions: Bed/chair exit alarm,Call light in reach

## 2022-01-20 NOTE — ROUTINE PROCESS
Bedside and Verbal shift change report given to Leticia (oncoming nurse) by Patti Siemens (offgoing nurse). Report included the following information SBAR and Kardex.    t

## 2022-01-20 NOTE — PROGRESS NOTES
Problem: Self Care Deficits Care Plan (Adult)  Goal: *Acute Goals and Plan of Care (Insert Text)  Description: Occupational Therapy Goals  Initiated 1/18/2022 within 7 day(s). 1.  Patient will perform grooming with supervision/set-up. 2.  Patient will perform bathing with minimal assistance/contact guard assist.  3.  Patient will perform upper body dressing and lower body dressing with minimal assistance/contact guard assist.  4.  Patient will perform bedside commode transfers with minimal assistance/contact guard assist using LRAD. 5.  Patient will perform all aspects of toileting with minimal assistance/contact guard assist.  6.  Patient will participate in upper extremity therapeutic exercise/activities with supervision/set-up for 8 minutes. 7.  Patient will utilize energy conservation techniques during functional activities with min verbal cues. Prior Level of Function: pt demonstrating difficulty providing accurate PLOF e.g. use of AD for functional mobility - pt states he used a walker and also states he does not use a walker. Pt indicated that his spouse assists with shower transfer and bathing tasks while seated on shower seat and spouse also assists with dressing tasks. Outcome: Progressing Towards Goal   OCCUPATIONAL THERAPY TREATMENT    Patient: Lynn Vargas (2 y.o. male)  Date: 1/20/2022  Diagnosis: COVID [U07.1]  Pneumonia due to COVID-19 virus [U07.1, J12.82] <principal problem not specified>       Precautions: Fall,Contact,Other (comment) (droplet+)    Chart, occupational therapy assessment, plan of care, and goals were reviewed. ASSESSMENT:  Pt confused, oriented to self and President only. On 15L O2. Pt pulling at male purewick upon entry, dislodging and urinating on himself. Pt not following commands to perform cheryl-care. Pt requires max verbal/tactile w/bed mobility to maneuver to EOB for UB dressing task. Pt requires hand over hand assist donning/doing [facility] gown.  Pt w/posterior lean requiring max verbal/tactile cues to maintain midline. Pt returned to supine for safety. Appreciate RT assist to reposition. Pt liable at end of session; unable to communicate needs. Alerted NSG, Phadran. Progression toward goals:  []          Improving appropriately and progressing toward goals  [x]          Improving slowly and progressing toward goals  []          Not making progress toward goals and plan of care will be adjusted     PLAN:  Patient continues to benefit from skilled intervention to address the above impairments. Continue treatment per established plan of care. Discharge Recommendations:  Rehab  Further Equipment Recommendations for Discharge:  possibly wheelchair      SUBJECTIVE:   Patient stated Mary Beth Coronel.  response to spouse name    OBJECTIVE DATA SUMMARY:   Cognitive/Behavioral Status:  Neurologic State: Alert,Confused  Orientation Level: Oriented to person  Cognition: Decreased attention/concentration,Decreased command following  Safety/Judgement: Fall prevention    Functional Mobility and Transfers for ADLs:   Bed Mobility:  Supine to Sit: Maximum assistance  Sit to Supine: Maximum assistance  Scooting: Maximum assistance;Assist x2;Bed Modified     Balance:  Sitting: Impaired  Sitting - Static: Fair (occasional)  Sitting - Dynamic: Poor (constant support)    ADL Intervention:  Grooming  Position Performed: Seated edge of bed  Washing Hands: Maximum assistance (w/hand ove rhand assist)    Upper Body [address]: Maximum assistance (w/hand over hand assist)    Toileting  Toileting Assistance: Maximum assistance  Bladder Hygiene: Maximum assistance  Clothing Management: Maximum assistance    Pain:  Pain level pre-treatment: 0/10   Pain level post-treatment: 0/10    Activity Tolerance:    Poor    Please refer to the flowsheet for vital signs taken during this treatment.   After treatment:   []  Patient left in no apparent distress sitting up in chair  [x]  Patient left in no apparent distress in bed  [x]  Call bell left within reach  [x]  Nursing notified  []  Caregiver present  []  Bed alarm activated    COMMUNICATION/EDUCATION:   [] Role of Occupational Therapy in the acute care setting  [] Home safety education was provided and the patient/caregiver indicated understanding. [] Patient/family have participated as able in working towards goals and plan of care. [] Patient/family agree to work toward stated goals and plan of care. [] Patient understands intent and goals of therapy, but is neutral about his/her participation. [x] Patient is unable to participate in goal setting and plan of care 2/2 confusion.     Thank you for this referral.  LIZ Noel  Time Calculation: 23 mins

## 2022-01-20 NOTE — PROGRESS NOTES
[facility] Hospitalist Group  Progress Note    Patient: Lacie De Guzman Age: 46 y.o. : 1970 MR#: [id number] SSN: [id number]  Date: 2022     Subjective:     Denies shortness of breath or pain/discomfort    Assessment/Plan:   1. Acute respiratory failure with hypoxia secondary to COVID-19 PNA  2. COVID-19 PNA  3. MONICA on CKD 3 in setting of COVID  4. Elevated troponin, demand ischemia   5. Hyperglycemia, DMT2  6. Acute on chronic DVT BLE  7. History of stroke  8. Stage 2 pressure ulcer of the gluteal cleft, POA    Plan  1. Monitor oxygen demand and wean as tolerated. Continues on 15 L  2. Nephrology following and recommendations strict I/Os, daily weights, encourage PO, follow ID recommendations, wean oxygen as tolerated, avoid IV contrast and nephrotoxins, renal dose medications  3. Pepcid and xarelto  4. Decadron completed per ID  5. POC glucose, SSI. Increase Lantus as blood sugar elevated despite being off Decadron  6. PT/OT eval and treat -recommending rehab upon discharge  7.  Wound care for pressure ulcer -barrier cream and silicone dressing    Plan of care was discussed with wife at phone number [phone] with patient permission at approximately 1:40 PM    Additional Notes:      Case discussed with:  [x]Patient  [x]Family  [x]Nursing  []Case Management  DVT Prophylaxis:  [x] Xarelto []Hep SQ  []SCDs  []Coumadin   []On Heparin gtt    Objective:   VS:   Visit Vitals  /82 (BP 1 Location: Left lower arm, BP Patient Position: At rest)   Pulse 63   Temp 98.5 °F (36.9 °C)   Resp 19   Ht 5' 11\" (1.803 m)   Wt 98.4 kg (217 lb)   SpO2 92%   BMI 30.27 kg/m²      Tmax/24hrs: Temp (24hrs), Av.2 °F (36.8 °C), Min:97.4 °F (36.3 °C), Max:98.7 °F (37.1 °C)      Intake/Output Summary (Last 24 hours) at 2022 1033  Last data filed at 2022 0425  Gross per 24 hour   Intake 259.17 ml   Output 700 ml   Net -440.83 ml       General:  Alert, NAD; initially had oxygen out of nose -replaced and directed patient to leave in his nose. Cardiovascular:  RRR  Pulmonary:  LSC throughout; respiratory effort WNL  GI:  +BS in all four quadrants, soft, non-tender  Extremities: right side weakness  No edema; 2+ dorsalis pedis pulses bilaterally  Neuro: alert and oriented x3    Labs:    Recent Results (from the past 24 hour(s))   GLUCOSE, POC    Collection Time: 01/19/22 12:22 PM   Result Value Ref Range    Glucose (POC) 218 (H) 70 - 110 mg/dL   GLUCOSE, POC    Collection Time: 01/19/22  4:49 PM   Result Value Ref Range    Glucose (POC) 356 (H) 70 - 110 mg/dL   GLUCOSE, POC    Collection Time: 01/19/22  9:07 PM   Result Value Ref Range    Glucose (POC) 265 (H) 70 - 110 mg/dL   CBC WITH AUTOMATED DIFF    Collection Time: 01/20/22  1:56 AM   Result Value Ref Range    WBC 12.1 4.6 - 13.2 K/uL    RBC 4.23 (L) 4.35 - 5.65 M/uL    HGB 12.8 (L) 13.0 - 16.0 g/dL    HCT 37.3 36.0 - 48.0 %    MCV 88.2 78.0 - 100.0 FL    MCH 30.3 24.0 - 34.0 PG    MCHC 34.3 31.0 - 37.0 g/dL    RDW 13.8 11.6 - 14.5 %    PLATELET 370 547 - 604 K/uL    MPV 12.4 (H) 9.2 - 11.8 FL    NRBC 0.0 0  WBC    ABSOLUTE NRBC 0.00 0.00 - 0.01 K/uL    NEUTROPHILS 92 (H) 40 - 73 %    LYMPHOCYTES 4 (L) 21 - 52 %    MONOCYTES 2 (L) 3 - 10 %    EOSINOPHILS 0 0 - 5 %    BASOPHILS 0 0 - 2 %    IMMATURE GRANULOCYTES 2 (H) 0.0 - 0.5 %    ABS. NEUTROPHILS 11.1 (H) 1.8 - 8.0 K/UL    ABS. LYMPHOCYTES 0.4 (L) 0.9 - 3.6 K/UL    ABS. MONOCYTES 0.3 0.05 - 1.2 K/UL    ABS. EOSINOPHILS 0.0 0.0 - 0.4 K/UL    ABS. BASOPHILS 0.0 0.0 - 0.1 K/UL    ABS. IMM.  GRANS. 0.3 (H) 0.00 - 0.04 K/UL    DF AUTOMATED     METABOLIC PANEL, COMPREHENSIVE    Collection Time: 01/20/22  1:56 AM   Result Value Ref Range    Sodium 133 (L) 136 - 145 mmol/L    Potassium 5.0 3.5 - 5.5 mmol/L    Chloride 101 100 - 111 mmol/L    CO2 25 21 - 32 mmol/L    Anion gap 7 3.0 - 18 mmol/L    Glucose 158 (H) 74 - 99 mg/dL    BUN 44 (H) 7.0 - 18 MG/DL    Creatinine 1.44 (H) 0.6 - 1.3 MG/DL BUN/Creatinine ratio 31 (H) 12 - 20      GFR est AA >60 >60 ml/min/1.73m2    GFR est non-AA 52 (L) >60 ml/min/1.73m2    Calcium 9.1 8.5 - 10.1 MG/DL    Bilirubin, total 0.8 0.2 - 1.0 MG/DL    ALT (SGPT) 57 16 - 61 U/L    AST (SGOT) 15 10 - 38 U/L    Alk.  phosphatase 60 45 - 117 U/L    Protein, total 6.8 6.4 - 8.2 g/dL    Albumin 2.2 (L) 3.4 - 5.0 g/dL    Globulin 4.6 (H) 2.0 - 4.0 g/dL    A-G Ratio 0.5 (L) 0.8 - 1.7     PROCALCITONIN    Collection Time: 01/20/22  1:56 AM   Result Value Ref Range    Procalcitonin 0.45 ng/mL   NT-PRO BNP    Collection Time: 01/20/22  1:56 AM   Result Value Ref Range    NT pro- 0 - 900 PG/ML   GLUCOSE, POC    Collection Time: 01/20/22  6:47 AM   Result Value Ref Range    Glucose (POC) 134 (H) 70 - 110 mg/dL       Signed By: Ely Moreno NP     January 20, 2022

## 2022-01-20 NOTE — PROGRESS NOTES
Chart reviewed. Pt remains on hi flow oxygen. Case Management will continue to monitor and assist with transitional needs when appropriate.           Barbi rFye, LEYLAN RN  Care Management  Pager: [phone]

## 2022-01-20 NOTE — PROGRESS NOTES
Bedside and Verbal shift change report given to Otto Hager (oncoming nurse) by Linsey Haji RN   (offgoing nurse). Report given with WON, Shiela, MAR and Recent Results.

## 2022-01-21 ENCOUNTER — APPOINTMENT (OUTPATIENT)
Dept: GENERAL RADIOLOGY | Age: 52
DRG: 177 | End: 2022-01-21
Attending: NURSE PRACTITIONER
Payer: MEDICARE

## 2022-01-21 LAB
ANION GAP SERPL CALC-SCNC: 8 MMOL/L (ref 3–18)
BUN SERPL-MCNC: 44 MG/DL (ref 7–18)
BUN/CREAT SERPL: 28 (ref 12–20)
CALCIUM SERPL-MCNC: 9 MG/DL (ref 8.5–10.1)
CHLORIDE SERPL-SCNC: 99 MMOL/L (ref 100–111)
CO2 SERPL-SCNC: 26 MMOL/L (ref 21–32)
CREAT SERPL-MCNC: 1.6 MG/DL (ref 0.6–1.3)
GLUCOSE BLD STRIP.AUTO-MCNC: 196 MG/DL (ref 70–110)
GLUCOSE BLD STRIP.AUTO-MCNC: 226 MG/DL (ref 70–110)
GLUCOSE BLD STRIP.AUTO-MCNC: 228 MG/DL (ref 70–110)
GLUCOSE BLD STRIP.AUTO-MCNC: 90 MG/DL (ref 70–110)
GLUCOSE SERPL-MCNC: 98 MG/DL (ref 74–99)
MAGNESIUM SERPL-MCNC: 1.3 MG/DL (ref 1.6–2.6)
POTASSIUM SERPL-SCNC: 4 MMOL/L (ref 3.5–5.5)
SODIUM SERPL-SCNC: 133 MMOL/L (ref 136–145)

## 2022-01-21 PROCEDURE — 2709999900 HC NON-CHARGEABLE SUPPLY

## 2022-01-21 PROCEDURE — 74011636637 HC RX REV CODE- 636/637: Performed by: NURSE PRACTITIONER

## 2022-01-21 PROCEDURE — 77010033711 HC HIGH FLOW OXYGEN

## 2022-01-21 PROCEDURE — 99232 SBSQ HOSP IP/OBS MODERATE 35: CPT | Performed by: INTERNAL MEDICINE

## 2022-01-21 PROCEDURE — 80048 BASIC METABOLIC PNL TOTAL CA: CPT

## 2022-01-21 PROCEDURE — 65660000000 HC RM CCU STEPDOWN

## 2022-01-21 PROCEDURE — 74011250637 HC RX REV CODE- 250/637: Performed by: INTERNAL MEDICINE

## 2022-01-21 PROCEDURE — 82962 GLUCOSE BLOOD TEST: CPT

## 2022-01-21 PROCEDURE — 94761 N-INVAS EAR/PLS OXIMETRY MLT: CPT

## 2022-01-21 PROCEDURE — 97530 THERAPEUTIC ACTIVITIES: CPT

## 2022-01-21 PROCEDURE — 74011250636 HC RX REV CODE- 250/636: Performed by: NURSE PRACTITIONER

## 2022-01-21 PROCEDURE — 74011250637 HC RX REV CODE- 250/637: Performed by: STUDENT IN AN ORGANIZED HEALTH CARE EDUCATION/TRAINING PROGRAM

## 2022-01-21 PROCEDURE — 74011000250 HC RX REV CODE- 250: Performed by: HOSPITALIST

## 2022-01-21 PROCEDURE — 94640 AIRWAY INHALATION TREATMENT: CPT

## 2022-01-21 PROCEDURE — 74011636637 HC RX REV CODE- 636/637: Performed by: INTERNAL MEDICINE

## 2022-01-21 PROCEDURE — 71045 X-RAY EXAM CHEST 1 VIEW: CPT

## 2022-01-21 PROCEDURE — APPSS30 APP SPLIT SHARED TIME 16-30 MINUTES: Performed by: NURSE PRACTITIONER

## 2022-01-21 PROCEDURE — 97110 THERAPEUTIC EXERCISES: CPT

## 2022-01-21 PROCEDURE — 74011250637 HC RX REV CODE- 250/637: Performed by: FAMILY MEDICINE

## 2022-01-21 PROCEDURE — 83735 ASSAY OF MAGNESIUM: CPT

## 2022-01-21 PROCEDURE — 36415 COLL VENOUS BLD VENIPUNCTURE: CPT

## 2022-01-21 RX ORDER — MAGNESIUM SULFATE HEPTAHYDRATE 40 MG/ML
2 INJECTION, SOLUTION INTRAVENOUS ONCE
Status: COMPLETED | OUTPATIENT
Start: 2022-01-21 | End: 2022-01-21

## 2022-01-21 RX ORDER — LANOLIN ALCOHOL/MO/W.PET/CERES
400 CREAM (GRAM) TOPICAL 2 TIMES DAILY
Status: DISCONTINUED | OUTPATIENT
Start: 2022-01-22 | End: 2022-01-23

## 2022-01-21 RX ADMIN — RIVAROXABAN 15 MG: 15 TABLET, FILM COATED ORAL at 09:25

## 2022-01-21 RX ADMIN — IPRATROPIUM BROMIDE AND ALBUTEROL 1 PUFF: 20; 100 SPRAY, METERED RESPIRATORY (INHALATION) at 07:35

## 2022-01-21 RX ADMIN — SODIUM CHLORIDE, PRESERVATIVE FREE 10 ML: 5 INJECTION INTRAVENOUS at 13:43

## 2022-01-21 RX ADMIN — Medication 6 UNITS: at 21:35

## 2022-01-21 RX ADMIN — Medication 3 UNITS: at 17:59

## 2022-01-21 RX ADMIN — IPRATROPIUM BROMIDE AND ALBUTEROL 1 PUFF: 20; 100 SPRAY, METERED RESPIRATORY (INHALATION) at 11:49

## 2022-01-21 RX ADMIN — Medication 6 UNITS: at 13:41

## 2022-01-21 RX ADMIN — IPRATROPIUM BROMIDE AND ALBUTEROL 1 PUFF: 20; 100 SPRAY, METERED RESPIRATORY (INHALATION) at 20:00

## 2022-01-21 RX ADMIN — AMLODIPINE BESYLATE 10 MG: 10 TABLET ORAL at 09:25

## 2022-01-21 RX ADMIN — Medication 25 UNITS: at 11:53

## 2022-01-21 RX ADMIN — METOPROLOL TARTRATE 25 MG: 25 TABLET, FILM COATED ORAL at 09:25

## 2022-01-21 RX ADMIN — RIVAROXABAN 15 MG: 15 TABLET, FILM COATED ORAL at 17:59

## 2022-01-21 RX ADMIN — SODIUM CHLORIDE, PRESERVATIVE FREE 10 ML: 5 INJECTION INTRAVENOUS at 06:14

## 2022-01-21 RX ADMIN — Medication 5000 UNITS: at 09:25

## 2022-01-21 RX ADMIN — SODIUM CHLORIDE, PRESERVATIVE FREE 10 ML: 5 INJECTION INTRAVENOUS at 21:36

## 2022-01-21 RX ADMIN — METOPROLOL TARTRATE 25 MG: 25 TABLET, FILM COATED ORAL at 21:33

## 2022-01-21 RX ADMIN — MAGNESIUM SULFATE HEPTAHYDRATE 2 G: 2 INJECTION, SOLUTION INTRAVENOUS at 10:34

## 2022-01-21 RX ADMIN — Medication 5 MG: at 21:33

## 2022-01-21 RX ADMIN — IPRATROPIUM BROMIDE AND ALBUTEROL 1 PUFF: 20; 100 SPRAY, METERED RESPIRATORY (INHALATION) at 15:51

## 2022-01-21 RX ADMIN — FAMOTIDINE 20 MG: 20 TABLET ORAL at 09:25

## 2022-01-21 NOTE — PROGRESS NOTES
Bedside and Verbal shift change report given to Lisa MARTINEZ (oncoming nurse) by Cuate Elena RN   (offgoing nurse). Report given with SBAR, Kardex, MAR and Recent Results.

## 2022-01-21 NOTE — PROGRESS NOTES
ARU/IPR REFERRAL CONTACT NOTE  [id number] [place] [address] for Physical Rehabilitation    RE:  Rose Schultz     Thank you for the opportunity to review this patient's case for admission to [id number] [place] [address] for Physical Rehabilitation. Based on our pre-admission screening:     Gallicus.Marianne ] Our Team/Medical Director is following this case. Comments:  Pt currently on hi flow at 15L. Confused. Will follow for potential IPR needs        Thank you for this referral.   Please do not hesitate to call if you need further information or have additional questions. Regards,    MISBAH Vail PTA for Taylor Hdez, AIDE  Admissions [facility] for Physical Rehabilitation  [phone]

## 2022-01-21 NOTE — PROGRESS NOTES
In Patient Progress note    Admit Date: 1/9/2022    Impression:     #1 Acute kidney injury, baseline creatinine 2019 was 1.5, no labs available since   then. --> improved , d/t prerenal azotemia Renal functions close to baseline, CT   abd with no hydro   #2. Acute hypoxic respiratory failure secondary to COVID-19 pneumonia  #3 COVID-19 pneumonia  #4 hypertension  #5 type 2 diabetes  #6 elevated troponins  #7 transaminitis  #8 hypoalbuminemia  #9 echocardiogram with normal EF but concentric hypertrophy likely from hypertension    Creatinine stable  Poor intake   Relative hypotension  Still needing 15 lit O2      Plan:  #1 strict ins and outs, daily weights  #2 encourage po intake   #3 wean O2 as tolerated  #4 hold amlodipine , hold parameters on metoprolol  #5 avoid IV contrast nephrotoxins  #6 renally dose medications for EGFR of less than 30  #7 no need for IVF , monitor renal panel daily  #8 follow ID recommendations    Since renal functions at baseline following peripherally over the weekend  Please call with questions,     Salazar Klein MD FASN  Cell [phone]  Pager: [phone]      Subjective:     - respiratory - HFNC  - hemodynamics - stable, no pressrs  - UOP-better  - Nutrition -poor     Objective:     Visit Vitals  /72   Pulse 87   Temp 98.4 °F (36.9 °C)   Resp 19   Ht 5' 11\" (1.803 m)   Wt 96.6 kg (213 lb)   SpO2 91%   BMI 29.71 kg/m²       No intake or output data in the 24 hours ending 01/21/22 1211    Physical Exam:          HFNC   No distress   HEENT: mmm  Lungs: alicja coarse BS   Cardiovascular system: S1, S2, regular rate and rhythm. Abdomen: soft, non tender, non distended. Extremities: no edema   Integumentary: skin is grossly intact. Neurologic: Alert, oriented time three.       Data Review:    Recent Labs     01/20/22  0156   WBC 12.1   RBC 4.23*   HCT 37.3   MCV 88.2   MCH 30.3   MCHC 34.3   RDW 13.8     Recent Labs     01/21/22  0304 01/20/22  0156 01/19/22  0307   BUN 44* 44* 56*   CREA 1.60* 1.44* 1.80*   CA 9.0 9.1 9.2   ALB  --  2.2*  --    K 4.0 5.0 4.8   * 133* 134*   CL 99* 101 103   CO2 26 25 25   GLU 98 158* 168*       Patrice Packer MD

## 2022-01-21 NOTE — PROGRESS NOTES
Problem: Mobility Impaired (Adult and Pediatric)  Goal: *Acute Goals and Plan of Care (Insert Text)  Description: Physical Therapy Goals  Initiated 1/18/2022 and to be accomplished within 7 day(s)  1. Patient will move from supine to sit and sit to supine , scoot up and down, and roll side to side in bed with minimal assistance/contact guard assist.    2.  Patient will transfer from bed to chair and chair to bed with Sheyla using the least restrictive device. 3.  Patient will perform sit to stand with Sheyla  4. Patient will ambulate with Sheyla  for 20 feet with the least restrictive device. 5.  Patient will ascend/descend 4 stairs with unilateral handrail(s) with minimal assistance/contact guard assist.    PLOF: Pt reporting he ambulates without AD, assist sometimes for bed mobility. Lives with wife in 1 story house with 4 YURIY. Outcome: Progressing Towards Goal   PHYSICAL THERAPY TREATMENT    Patient: Jayson Hoover (20 y.o. male)  Date: 1/21/2022  Diagnosis: COVID [U07.1]  Pneumonia due to COVID-19 virus [U07.1, J12.82] <principal problem not specified>       Precautions: Fall,Contact,Other (comment) (droplet+)  PLOF: see above    ASSESSMENT:  Pt in bed and agreeable to participate in PT treatment session. Pt was on 15L of O2 and O2 sat was 92% at rest.  Pt performed supine LE exercises with assistance to break tone and ensure proper movement patterns. Pt transferred supine to sit with min A and demonstrated fair static sitting balance with UE support. Pt stood with min/mod A twice to scoot along the edge of the bed. Pt then reported feeling fatigued and requested to return to supine. Pt transferred sit to supine with mod A to prevent injury as he tends to fling himself back on the bed. Patient's O2 was assessed to ensure appropriate level prior to leaving the room and was found to be 65% however patient was not visible SOB or in distress.   Patient's O2 sat gradually increased to 84% over approximately 6-7 minutes and then required another 8-10 minutes to increase to 95%. Pt was left in bed with needs in reach and nursing was notified of drop in O2 sats with mobility. Progression toward goals:   []      Improving appropriately and progressing toward goals  [x]      Improving slowly and progressing toward goals  []      Not making progress toward goals and plan of care will be adjusted     PLAN:  Patient continues to benefit from skilled intervention to address the above impairments. Continue treatment per established plan of care. Discharge Recommendations:  Rehab  Further Equipment Recommendations for Discharge:  N/A     SUBJECTIVE:   Patient stated I'm frustrated.  when asked if he was hurting or frustrated with his current situation when tearful several times in session. OBJECTIVE DATA SUMMARY:   Critical Behavior:  Neurologic State: Alert,Confused  Orientation Level: Oriented to person  Cognition: Decreased command following,Decreased attention/concentration,Impaired decision making  Safety/Judgement: Fall prevention  Functional Mobility Training:  Bed Mobility:  Supine to Sit: Minimum assistance  Sit to Supine: Moderate assistance (to control movement)      Transfers:  Sit to Stand: Minimum assistance; Moderate assistance  Stand to Sit: Minimum assistance  Balance:  Sitting - Static: Fair (occasional)  Sitting - Dynamic: Fair (occasional)  Standing: With support  Standing - Static:  (fair-/poor+)  Standing - Dynamic :  (poor+)           Therapeutic Exercises:         EXERCISE   Sets   Reps   Active Active Assist   Passive Self ROM   Comments   Ankle Pumps 1 10  [x] [] [] []    Quad Sets/Glut Sets    [] [] [] [] Hold for 5 secs   Hamstring Sets   [] [] [] []    Short Arc Quads   [] [] [] []    Heel Slides 2 10 [] [x] [] []    Straight Leg Raises   [] [] [] []    Hip abd/Add 2 10 [] [x] [] []    Long Arc Quads   [] [] [] []    Seated Marching   [] [] [] []    Standing Marching   [] [] [] []       [] [] [] []        Pain:  Pain level pre-treatment: 0/10  Pain level post-treatment: 0/10   Pain Intervention(s): n/a    Activity Tolerance:   poor  Please refer to the flowsheet for vital signs taken during this treatment. After treatment:   [] Patient left in no apparent distress sitting up in chair  [x] Patient left in no apparent distress in bed  [x] Call bell left within reach  [x] Nursing notified  [] Caregiver present  [] Bed alarm activated  [] SCDs applied      COMMUNICATION/EDUCATION:   [x]         Role of Physical Therapy in the acute care setting. [x]         Fall prevention education was provided and the patient/caregiver indicated understanding. [x]         Patient/family have participated as able in working toward goals and plan of care. [x]         Patient/family agree to work toward stated goals and plan of care. []         Patient understands intent and goals of therapy, but is neutral about his/her participation.   []         Patient is unable to participate in stated goals/plan of care: ongoing with therapy staff.  []         Other:        Desean Palacio, PT   Time Calculation: 39 mins

## 2022-01-21 NOTE — PROGRESS NOTES
[facility]    Patient: Jatinder Zapata Age: 46 y.o. : 1970 MR#: [id number] SSN: [id number]  Date: 2022     Subjective:     Denies shortness of breath or pain/discomfort    Assessment/Plan:   1. Acute respiratory failure with hypoxia secondary to COVID-19 PNA  2. COVID-19 PNA  3. MONICA on CKD 3 in setting of COVID  4. Elevated troponin, demand ischemia   5. Hyperglycemia, DMT2  6. Acute on chronic DVT BLE  7. History of stroke  8. Stage 2 pressure ulcer of the gluteal cleft, POA    Plan  1. Monitor oxygen demand and wean as tolerated. Continues on 15 L  2. Nephrology following and recommendations strict I/Os, daily weights, encourage PO, follow ID recommendations, wean oxygen as tolerated, avoid IV contrast and nephrotoxins, renal dose medications; encourage oral intake  3. Pepcid and xarelto  4. Decadron completed per ID  5. POC glucose, SSI. Continue current dose of Lantus, sugars improved  6. PT/OT eval and treat -recommending rehab upon discharge  7. Wound care for pressure ulcer -barrier cream and silicone dressing    Message was left with wife at phone number [phone] with patient permission at approximately 4:14PM    Additional Notes:      Case discussed with:  [x]Patient  [x]Family (voicemail left) [x]Nursing  []Case Management  DVT Prophylaxis:  [x] Xarelto []Hep SQ  []SCDs  []Coumadin   []On Heparin gtt    Objective:   VS:   Visit Vitals  /82 (BP 1 Location: Left arm, BP Patient Position: At rest)   Pulse 81   Temp 98 °F (36.7 °C)   Resp 19   Ht 5' 11\" (1.803 m)   Wt 96.6 kg (213 lb)   SpO2 90%   BMI 29.71 kg/m²      Tmax/24hrs: Temp (24hrs), Av.1 °F (36.7 °C), Min:97.3 °F (36.3 °C), Max:98.6 °F (37 °C)    No intake or output data in the 24 hours ending 22 0806    General:  Alert, NAD; initially had oxygen out of nose -replaced and directed patient to leave in his nose.   Cardiovascular:  RRR  Pulmonary:  LSC throughout; respiratory effort WNL  GI:  +BS in all four quadrants, soft, non-tender  Extremities: right side weakness  No edema; 2+ dorsalis pedis pulses bilaterally  Neuro: alert and oriented x3    Labs:    Recent Results (from the past 24 hour(s))   GLUCOSE, POC    Collection Time: 01/20/22 11:26 AM   Result Value Ref Range    Glucose (POC) 274 (H) 70 - 110 mg/dL   GLUCOSE, POC    Collection Time: 01/20/22  4:08 PM   Result Value Ref Range    Glucose (POC) 200 (H) 70 - 110 mg/dL   GLUCOSE, POC    Collection Time: 01/20/22  9:11 PM   Result Value Ref Range    Glucose (POC) 190 (H) 70 - 255 mg/dL   METABOLIC PANEL, BASIC    Collection Time: 01/21/22  3:04 AM   Result Value Ref Range    Sodium 133 (L) 136 - 145 mmol/L    Potassium 4.0 3.5 - 5.5 mmol/L    Chloride 99 (L) 100 - 111 mmol/L    CO2 26 21 - 32 mmol/L    Anion gap 8 3.0 - 18 mmol/L    Glucose 98 74 - 99 mg/dL    BUN 44 (H) 7.0 - 18 MG/DL    Creatinine 1.60 (H) 0.6 - 1.3 MG/DL    BUN/Creatinine ratio 28 (H) 12 - 20      GFR est AA 56 (L) >60 ml/min/1.73m2    GFR est non-AA 46 (L) >60 ml/min/1.73m2    Calcium 9.0 8.5 - 10.1 MG/DL   MAGNESIUM    Collection Time: 01/21/22  3:04 AM   Result Value Ref Range    Magnesium 1.3 (L) 1.6 - 2.6 mg/dL   GLUCOSE, POC    Collection Time: 01/21/22  6:52 AM   Result Value Ref Range    Glucose (POC) 90 70 - 110 mg/dL       Signed By: Suhail Adair NP     January 21, 2022

## 2022-01-22 LAB
ANION GAP SERPL CALC-SCNC: 5 MMOL/L (ref 3–18)
BASOPHILS # BLD: 0 K/UL (ref 0–0.1)
BASOPHILS NFR BLD: 0 % (ref 0–2)
BUN SERPL-MCNC: 43 MG/DL (ref 7–18)
BUN/CREAT SERPL: 28 (ref 12–20)
CALCIUM SERPL-MCNC: 9 MG/DL (ref 8.5–10.1)
CHLORIDE SERPL-SCNC: 104 MMOL/L (ref 100–111)
CO2 SERPL-SCNC: 23 MMOL/L (ref 21–32)
CREAT SERPL-MCNC: 1.52 MG/DL (ref 0.6–1.3)
DIFFERENTIAL METHOD BLD: ABNORMAL
EOSINOPHIL # BLD: 0.1 K/UL (ref 0–0.4)
EOSINOPHIL NFR BLD: 1 % (ref 0–5)
ERYTHROCYTE [DISTWIDTH] IN BLOOD BY AUTOMATED COUNT: 13.3 % (ref 11.6–14.5)
GLUCOSE BLD STRIP.AUTO-MCNC: 120 MG/DL (ref 70–110)
GLUCOSE BLD STRIP.AUTO-MCNC: 123 MG/DL (ref 70–110)
GLUCOSE BLD STRIP.AUTO-MCNC: 132 MG/DL (ref 70–110)
GLUCOSE BLD STRIP.AUTO-MCNC: 202 MG/DL (ref 70–110)
GLUCOSE SERPL-MCNC: 95 MG/DL (ref 74–99)
HCT VFR BLD AUTO: 39.9 % (ref 36–48)
HGB BLD-MCNC: 13.2 G/DL (ref 13–16)
IMM GRANULOCYTES # BLD AUTO: 0.2 K/UL (ref 0–0.04)
IMM GRANULOCYTES NFR BLD AUTO: 2 % (ref 0–0.5)
LYMPHOCYTES # BLD: 0.7 K/UL (ref 0.9–3.6)
LYMPHOCYTES NFR BLD: 6 % (ref 21–52)
MAGNESIUM SERPL-MCNC: 1.5 MG/DL (ref 1.6–2.6)
MCH RBC QN AUTO: 27.8 PG (ref 24–34)
MCHC RBC AUTO-ENTMCNC: 33.1 G/DL (ref 31–37)
MCV RBC AUTO: 84 FL (ref 78–100)
MONOCYTES # BLD: 0.6 K/UL (ref 0.05–1.2)
MONOCYTES NFR BLD: 5 % (ref 3–10)
NEUTS SEG # BLD: 9.7 K/UL (ref 1.8–8)
NEUTS SEG NFR BLD: 87 % (ref 40–73)
NRBC # BLD: 0 K/UL (ref 0–0.01)
NRBC BLD-RTO: 0 PER 100 WBC
PLATELET # BLD AUTO: 231 K/UL (ref 135–420)
PMV BLD AUTO: 12 FL (ref 9.2–11.8)
POTASSIUM SERPL-SCNC: 4.7 MMOL/L (ref 3.5–5.5)
RBC # BLD AUTO: 4.75 M/UL (ref 4.35–5.65)
SODIUM SERPL-SCNC: 132 MMOL/L (ref 136–145)
WBC # BLD AUTO: 11.2 K/UL (ref 4.6–13.2)

## 2022-01-22 PROCEDURE — 65660000000 HC RM CCU STEPDOWN

## 2022-01-22 PROCEDURE — 82962 GLUCOSE BLOOD TEST: CPT

## 2022-01-22 PROCEDURE — 77010033678 HC OXYGEN DAILY

## 2022-01-22 PROCEDURE — 99232 SBSQ HOSP IP/OBS MODERATE 35: CPT | Performed by: INTERNAL MEDICINE

## 2022-01-22 PROCEDURE — 74011000250 HC RX REV CODE- 250: Performed by: HOSPITALIST

## 2022-01-22 PROCEDURE — 74011250637 HC RX REV CODE- 250/637: Performed by: INTERNAL MEDICINE

## 2022-01-22 PROCEDURE — 85025 COMPLETE CBC W/AUTO DIFF WBC: CPT

## 2022-01-22 PROCEDURE — 94640 AIRWAY INHALATION TREATMENT: CPT

## 2022-01-22 PROCEDURE — 83735 ASSAY OF MAGNESIUM: CPT

## 2022-01-22 PROCEDURE — 74011250636 HC RX REV CODE- 250/636: Performed by: INTERNAL MEDICINE

## 2022-01-22 PROCEDURE — 36415 COLL VENOUS BLD VENIPUNCTURE: CPT

## 2022-01-22 PROCEDURE — 94760 N-INVAS EAR/PLS OXIMETRY 1: CPT

## 2022-01-22 PROCEDURE — 74011250637 HC RX REV CODE- 250/637: Performed by: NURSE PRACTITIONER

## 2022-01-22 PROCEDURE — 80048 BASIC METABOLIC PNL TOTAL CA: CPT

## 2022-01-22 PROCEDURE — 74011250637 HC RX REV CODE- 250/637: Performed by: STUDENT IN AN ORGANIZED HEALTH CARE EDUCATION/TRAINING PROGRAM

## 2022-01-22 PROCEDURE — 74011250637 HC RX REV CODE- 250/637: Performed by: FAMILY MEDICINE

## 2022-01-22 PROCEDURE — 74011636637 HC RX REV CODE- 636/637: Performed by: INTERNAL MEDICINE

## 2022-01-22 PROCEDURE — 74011636637 HC RX REV CODE- 636/637: Performed by: NURSE PRACTITIONER

## 2022-01-22 RX ORDER — MAGNESIUM SULFATE 1 G/100ML
1 INJECTION INTRAVENOUS ONCE
Status: COMPLETED | OUTPATIENT
Start: 2022-01-22 | End: 2022-01-22

## 2022-01-22 RX ORDER — METOPROLOL TARTRATE 25 MG/1
12.5 TABLET, FILM COATED ORAL EVERY 12 HOURS
Status: DISCONTINUED | OUTPATIENT
Start: 2022-01-22 | End: 2022-02-04 | Stop reason: HOSPADM

## 2022-01-22 RX ADMIN — IPRATROPIUM BROMIDE AND ALBUTEROL 1 PUFF: 20; 100 SPRAY, METERED RESPIRATORY (INHALATION) at 00:00

## 2022-01-22 RX ADMIN — Medication 5 MG: at 21:42

## 2022-01-22 RX ADMIN — Medication 400 MG: at 17:23

## 2022-01-22 RX ADMIN — FAMOTIDINE 20 MG: 20 TABLET ORAL at 09:09

## 2022-01-22 RX ADMIN — SODIUM CHLORIDE, PRESERVATIVE FREE 10 ML: 5 INJECTION INTRAVENOUS at 05:20

## 2022-01-22 RX ADMIN — SODIUM CHLORIDE, PRESERVATIVE FREE 10 ML: 5 INJECTION INTRAVENOUS at 13:03

## 2022-01-22 RX ADMIN — SODIUM CHLORIDE, PRESERVATIVE FREE 10 ML: 5 INJECTION INTRAVENOUS at 21:44

## 2022-01-22 RX ADMIN — IPRATROPIUM BROMIDE AND ALBUTEROL 1 PUFF: 20; 100 SPRAY, METERED RESPIRATORY (INHALATION) at 04:00

## 2022-01-22 RX ADMIN — Medication 5000 UNITS: at 09:09

## 2022-01-22 RX ADMIN — DEXAMETHASONE 6 MG: 2 TABLET ORAL at 17:23

## 2022-01-22 RX ADMIN — IPRATROPIUM BROMIDE AND ALBUTEROL 1 PUFF: 20; 100 SPRAY, METERED RESPIRATORY (INHALATION) at 20:58

## 2022-01-22 RX ADMIN — RIVAROXABAN 15 MG: 15 TABLET, FILM COATED ORAL at 17:23

## 2022-01-22 RX ADMIN — METOPROLOL TARTRATE 12.5 MG: 25 TABLET, FILM COATED ORAL at 21:42

## 2022-01-22 RX ADMIN — Medication 6 UNITS: at 21:41

## 2022-01-22 RX ADMIN — MAGNESIUM SULFATE HEPTAHYDRATE 1 G: 1 INJECTION, SOLUTION INTRAVENOUS at 17:23

## 2022-01-22 RX ADMIN — Medication 40 UNITS: at 09:09

## 2022-01-22 RX ADMIN — RIVAROXABAN 15 MG: 15 TABLET, FILM COATED ORAL at 09:09

## 2022-01-22 RX ADMIN — Medication 400 MG: at 09:15

## 2022-01-22 NOTE — PROGRESS NOTES
The  provided the following Interventions:   helped the patient Ivana Thomas, who is a 46 y.o.,male, connect with the family with the Zoom Video Connection.  also engaged supportively with Mr. Nadja Flores and agreed to provide another Zoom meeting on Sunday 1/23. The following outcomes were achieved:  Mr. Nadja Flores was able to see his family over the Video zoom connection. The Nadja Flores family expressed appreciation for the opportunity to connect. Assessment:  Though Mr. Nadja Flores did not share openly concerning his emotions, he was tearful  During and after the Zoom Visit. He  appears to beexperincing some  at this time. Plan:   will provide another Zoom meeting tomorrow set tentatively for [phone]. Chaplains will remain available and recommends bedside caregivers page  on duty if needed.     [address]  [phone]

## 2022-01-22 NOTE — PROGRESS NOTES
Hospitalist Progress Note    Subjective:   Daily Progress Note: 1/22/2022     Patient was not keeping his oxygen on. When I saw him he was saturating 93 percentage on room air followed by 86 percentage on room air. 6 L oxygen was placed. Advised nurses to keep an eye on him. Patient says he is feeling fine. Denies any chest pain or abdominal pain. No nausea and vomiting.     Current Facility-Administered Medications   Medication Dose Route Frequency    magnesium oxide (MAG-OX) tablet 400 mg  400 mg Oral BID    insulin glargine (LANTUS) injection 40 Units  40 Units SubCUTAneous DAILY    dextrose 10% infusion 125-250 mL  125-250 mL IntraVENous PRN    [Held by provider] metoprolol tartrate (LOPRESSOR) tablet 25 mg  25 mg Oral Q12H    ipratropium-albuterol (COMBIVENT RESPIMAT) 20 mcg-100 mcg inhalation spray  1 Puff Inhalation Q4H RT    hydrALAZINE (APRESOLINE) 20 mg/mL injection 10 mg  10 mg IntraVENous Q6H PRN    [Held by provider] amLODIPine (NORVASC) tablet 10 mg  10 mg Oral DAILY    rivaroxaban (XARELTO) tablet 15 mg  15 mg Oral BID WITH MEALS    sodium chloride (NS) flush 5-40 mL  5-40 mL IntraVENous Q8H    sodium chloride (NS) flush 5-40 mL  5-40 mL IntraVENous PRN    acetaminophen (TYLENOL) tablet 650 mg  650 mg Oral Q6H PRN    Or    acetaminophen (TYLENOL) suppository 650 mg  650 mg Rectal Q6H PRN    polyethylene glycol (MIRALAX) packet 17 g  17 g Oral DAILY PRN    ondansetron (ZOFRAN ODT) tablet 4 mg  4 mg Oral Q8H PRN    Or    ondansetron (ZOFRAN) injection 4 mg  4 mg IntraVENous Q6H PRN    insulin lispro (HUMALOG) injection   SubCUTAneous AC&HS    glucose chewable tablet 16 g  4 Tablet Oral PRN    glucagon (GLUCAGEN) injection 1 mg  1 mg IntraMUSCular PRN    famotidine (PEPCID) tablet 20 mg  20 mg Oral DAILY    melatonin tablet 5 mg  5 mg Oral QHS    cholecalciferol (VITAMIN D3) capsule 5,000 Units  5,000 Units Oral DAILY        Review of Systems  A comprehensive review of systems was negative except for that written in the HPI. Objective:     Visit Vitals  /79   Pulse 90   Temp 98.4 °F (36.9 °C)   Resp 18   Ht 5' 11\" (1.803 m)   Wt 96.6 kg (213 lb)   SpO2 98%   BMI 29.71 kg/m²    O2 Flow Rate (L/min): 15 l/min O2 Device: Hi flow nasal cannula    Temp (24hrs), Av.7 °F (37.1 °C), Min:98.3 °F (36.8 °C), Max:99.6 °F (37.6 °C)      No intake/output data recorded. No intake/output data recorded. General appearance -awake, follows commands appropriately, not in acute distress, oxygen is in situ currently. Chest -clear air entry noted in bases, no wheezes  Heart - S1 and S2 normal  Abdomen - soft, nontender, nondistended, Bowel sounds present  Neurological -awake, moves all extremities very well, no tremors noted.   Musculoskeletal - no joint tenderness or swelling of knees bilaterally  Extremities - no pedal edema noted      Data Review    Recent Results (from the past 24 hour(s))   GLUCOSE, POC    Collection Time: 22  5:02 PM   Result Value Ref Range    Glucose (POC) 196 (H) 70 - 110 mg/dL   GLUCOSE, POC    Collection Time: 22  9:05 PM   Result Value Ref Range    Glucose (POC) 226 (H) 70 - 342 mg/dL   METABOLIC PANEL, BASIC    Collection Time: 22  1:33 AM   Result Value Ref Range    Sodium 132 (L) 136 - 145 mmol/L    Potassium 4.7 3.5 - 5.5 mmol/L    Chloride 104 100 - 111 mmol/L    CO2 23 21 - 32 mmol/L    Anion gap 5 3.0 - 18 mmol/L    Glucose 95 74 - 99 mg/dL    BUN 43 (H) 7.0 - 18 MG/DL    Creatinine 1.52 (H) 0.6 - 1.3 MG/DL    BUN/Creatinine ratio 28 (H) 12 - 20      GFR est AA 59 (L) >60 ml/min/1.73m2    GFR est non-AA 49 (L) >60 ml/min/1.73m2    Calcium 9.0 8.5 - 10.1 MG/DL   CBC WITH AUTOMATED DIFF    Collection Time: 22  1:33 AM   Result Value Ref Range    WBC 11.2 4.6 - 13.2 K/uL    RBC 4.75 4.35 - 5.65 M/uL    HGB 13.2 13.0 - 16.0 g/dL    HCT 39.9 36.0 - 48.0 %    MCV 84.0 78.0 - 100.0 FL    MCH 27.8 24.0 - 34.0 PG    MCHC 33.1 31.0 - 37.0 g/dL RDW 13.3 11.6 - 14.5 %    PLATELET 755 348 - 732 K/uL    MPV 12.0 (H) 9.2 - 11.8 FL    NRBC 0.0 0  WBC    ABSOLUTE NRBC 0.00 0.00 - 0.01 K/uL    NEUTROPHILS 87 (H) 40 - 73 %    LYMPHOCYTES 6 (L) 21 - 52 %    MONOCYTES 5 3 - 10 %    EOSINOPHILS 1 0 - 5 %    BASOPHILS 0 0 - 2 %    IMMATURE GRANULOCYTES 2 (H) 0.0 - 0.5 %    ABS. NEUTROPHILS 9.7 (H) 1.8 - 8.0 K/UL    ABS. LYMPHOCYTES 0.7 (L) 0.9 - 3.6 K/UL    ABS. MONOCYTES 0.6 0.05 - 1.2 K/UL    ABS. EOSINOPHILS 0.1 0.0 - 0.4 K/UL    ABS. BASOPHILS 0.0 0.0 - 0.1 K/UL    ABS. IMM. GRANS. 0.2 (H) 0.00 - 0.04 K/UL    DF AUTOMATED     MAGNESIUM    Collection Time: 01/22/22  1:33 AM   Result Value Ref Range    Magnesium 1.5 (L) 1.6 - 2.6 mg/dL   GLUCOSE, POC    Collection Time: 01/22/22  7:07 AM   Result Value Ref Range    Glucose (POC) 120 (H) 70 - 110 mg/dL   GLUCOSE, POC    Collection Time: 01/22/22 12:46 PM   Result Value Ref Range    Glucose (POC) 123 (H) 70 - 110 mg/dL         Assessment/Plan:     Active Problems:    Pneumonia due to COVID-19 virus (1/10/2022)      Acute respiratory failure with hypoxia (HCC) (1/11/2022)      Acute deep vein thrombosis (DVT) of both lower extremities ([facility] [address] (1/11/2022)      Hypercoagulable state associated with COVID-19 ([facility] [address] (1/11/2022)      MONICA (acute kidney injury) ([facility] [address] (1/11/2022)      Non-traumatic rhabdomyolysis (1/12/2022)      ASSESSMENT:    1.  Acute respiratory failure with hypoxia due to COVID-19 infection   2.  COVID-19 pneumonia   3.  MONICA on CKD3, associate with covid infection, resolving  4.  Elevated troponin, demand ischemia,   5.  Hyperglycemia with T2DM, HgbA1c 7.8%  6.  Acute DVT on legs, associate hypercoagulable state due to covid   7.  H/o stroke with right-sided hemiparesis   8. Stage 2 pressure ulcer of the gluteal cleft, POA  9. Hypomagnesemia    PLAN:    Continue magnesium oxide and will also give 1 dose of IV magnesium for #9  Continue oxygen and wean this patient off oxygen.   Continue Combivent  Continue Lantus and insulin sliding scale  Continue Xarelto for DVT  Patient has completed IV Decadron course until January 19, 2022. Due to patient still requiring significant amount of oxygen, will put him on p.o. steroid for 5 days and monitor. PT and OT to follow this patient  Restart low-dose metoprolol and monitor this patient. Disclaimer: Sections of this note are dictated using utilizing voice recognition software, which may have resulted in some phonetic based errors in grammar and contents. Even though attempts were made to correct all the mistakes, some may have been missed, and remained in the body of the document. If questions arise, please contact our department. Care Plan discussed with: Patient/Family and Nurse    Total time spent with patient: 30 minutes.

## 2022-01-22 NOTE — ROUTINE PROCESS
0745-/Bedside shift change report given to Aretha (oncoming nurse) by Irving Davidson (offgoing nurse). Report included the following information SBAR, MAR and Recent Results. 1241-patient continues to remove O2, but O2 sat is approximately 92% on room air. Titrated O2 down to 4L. Will continue to monitor per physician's orders. 1245-patient sats dropped to 86% on 4L. Bumped patient up to 5L. Will titrate more gradually to determine is patient is able to tolerate it. 1300-patient dropped to 69% on 5L after performing incontinence care. Patient is bumped back up to 6L with an O2 sat of 80% at this time. Will continue to monitor. Patient is asymptomatic. 1535-O2 87% on 6L. 1540-will increase O2 to 8L per physician's orders. Patient's goal O2 saturation is approximately 92%. **Spoke to the patient's wife about arranging a video call. Patient has been referred to spiritual care to make arrangements. 1558-patient will have Zoom video call with family members at [phone].    1945-/Bedside and Written shift change report given to Lisa (oncoming nurse) by Yu Rao (offgoing nurse). Report included the following information SBAR, MAR and Recent Results.

## 2022-01-22 NOTE — PROGRESS NOTES
Following peripherally today   Renal functions stable   Agree with replacing mag for hypomag  Hold BP meds d/t soft blood pressures  Wean O2 as tolerated  Mild hyponatremia ,monitor     Please call with questions,    Patrice Lee MD [facility]  Cell [phone]  Pager: [phone]

## 2022-01-23 LAB
ANION GAP SERPL CALC-SCNC: 7 MMOL/L (ref 3–18)
BUN SERPL-MCNC: 49 MG/DL (ref 7–18)
BUN/CREAT SERPL: 29 (ref 12–20)
CALCIUM SERPL-MCNC: 9.1 MG/DL (ref 8.5–10.1)
CHLORIDE SERPL-SCNC: 103 MMOL/L (ref 100–111)
CO2 SERPL-SCNC: 22 MMOL/L (ref 21–32)
CREAT SERPL-MCNC: 1.7 MG/DL (ref 0.6–1.3)
GLUCOSE BLD STRIP.AUTO-MCNC: 184 MG/DL (ref 70–110)
GLUCOSE BLD STRIP.AUTO-MCNC: 241 MG/DL (ref 70–110)
GLUCOSE BLD STRIP.AUTO-MCNC: 248 MG/DL (ref 70–110)
GLUCOSE BLD STRIP.AUTO-MCNC: 305 MG/DL (ref 70–110)
GLUCOSE SERPL-MCNC: 196 MG/DL (ref 74–99)
MAGNESIUM SERPL-MCNC: 2.7 MG/DL (ref 1.6–2.6)
POTASSIUM SERPL-SCNC: 5.5 MMOL/L (ref 3.5–5.5)
SODIUM SERPL-SCNC: 132 MMOL/L (ref 136–145)

## 2022-01-23 PROCEDURE — 83735 ASSAY OF MAGNESIUM: CPT

## 2022-01-23 PROCEDURE — 74011250637 HC RX REV CODE- 250/637: Performed by: NURSE PRACTITIONER

## 2022-01-23 PROCEDURE — 94640 AIRWAY INHALATION TREATMENT: CPT

## 2022-01-23 PROCEDURE — 74011250637 HC RX REV CODE- 250/637: Performed by: INTERNAL MEDICINE

## 2022-01-23 PROCEDURE — 99232 SBSQ HOSP IP/OBS MODERATE 35: CPT | Performed by: INTERNAL MEDICINE

## 2022-01-23 PROCEDURE — 74011250637 HC RX REV CODE- 250/637: Performed by: FAMILY MEDICINE

## 2022-01-23 PROCEDURE — 65660000000 HC RM CCU STEPDOWN

## 2022-01-23 PROCEDURE — 74011250636 HC RX REV CODE- 250/636: Performed by: INTERNAL MEDICINE

## 2022-01-23 PROCEDURE — 82962 GLUCOSE BLOOD TEST: CPT

## 2022-01-23 PROCEDURE — 74011000250 HC RX REV CODE- 250: Performed by: HOSPITALIST

## 2022-01-23 PROCEDURE — 74011636637 HC RX REV CODE- 636/637: Performed by: INTERNAL MEDICINE

## 2022-01-23 PROCEDURE — 77010033711 HC HIGH FLOW OXYGEN

## 2022-01-23 PROCEDURE — 74011250637 HC RX REV CODE- 250/637: Performed by: STUDENT IN AN ORGANIZED HEALTH CARE EDUCATION/TRAINING PROGRAM

## 2022-01-23 PROCEDURE — 74011636637 HC RX REV CODE- 636/637: Performed by: NURSE PRACTITIONER

## 2022-01-23 PROCEDURE — 94761 N-INVAS EAR/PLS OXIMETRY MLT: CPT

## 2022-01-23 PROCEDURE — 36415 COLL VENOUS BLD VENIPUNCTURE: CPT

## 2022-01-23 PROCEDURE — 80048 BASIC METABOLIC PNL TOTAL CA: CPT

## 2022-01-23 RX ORDER — INSULIN GLARGINE 100 [IU]/ML
5 INJECTION, SOLUTION SUBCUTANEOUS
Status: COMPLETED | OUTPATIENT
Start: 2022-01-23 | End: 2022-01-23

## 2022-01-23 RX ORDER — MIRTAZAPINE 15 MG/1
7.5 TABLET, FILM COATED ORAL
Status: DISCONTINUED | OUTPATIENT
Start: 2022-01-23 | End: 2022-02-04 | Stop reason: HOSPADM

## 2022-01-23 RX ORDER — INSULIN GLARGINE 100 [IU]/ML
45 INJECTION, SOLUTION SUBCUTANEOUS DAILY
Status: DISCONTINUED | OUTPATIENT
Start: 2022-01-24 | End: 2022-01-28

## 2022-01-23 RX ADMIN — Medication 5 UNITS: at 12:00

## 2022-01-23 RX ADMIN — Medication 40 UNITS: at 09:11

## 2022-01-23 RX ADMIN — SODIUM CHLORIDE, PRESERVATIVE FREE 10 ML: 5 INJECTION INTRAVENOUS at 13:24

## 2022-01-23 RX ADMIN — Medication 6 UNITS: at 16:56

## 2022-01-23 RX ADMIN — METOPROLOL TARTRATE 12.5 MG: 25 TABLET, FILM COATED ORAL at 21:26

## 2022-01-23 RX ADMIN — IPRATROPIUM BROMIDE AND ALBUTEROL 1 PUFF: 20; 100 SPRAY, METERED RESPIRATORY (INHALATION) at 11:52

## 2022-01-23 RX ADMIN — FAMOTIDINE 20 MG: 20 TABLET ORAL at 09:10

## 2022-01-23 RX ADMIN — DEXAMETHASONE 6 MG: 2 TABLET ORAL at 09:10

## 2022-01-23 RX ADMIN — SODIUM CHLORIDE, PRESERVATIVE FREE 10 ML: 5 INJECTION INTRAVENOUS at 05:34

## 2022-01-23 RX ADMIN — Medication 5 MG: at 21:26

## 2022-01-23 RX ADMIN — MIRTAZAPINE 7.5 MG: 15 TABLET, FILM COATED ORAL at 21:32

## 2022-01-23 RX ADMIN — Medication 3 UNITS: at 09:11

## 2022-01-23 RX ADMIN — Medication 6 UNITS: at 21:25

## 2022-01-23 RX ADMIN — Medication 12 UNITS: at 12:23

## 2022-01-23 RX ADMIN — IPRATROPIUM BROMIDE AND ALBUTEROL 1 PUFF: 20; 100 SPRAY, METERED RESPIRATORY (INHALATION) at 20:19

## 2022-01-23 RX ADMIN — SODIUM CHLORIDE, PRESERVATIVE FREE 10 ML: 5 INJECTION INTRAVENOUS at 21:28

## 2022-01-23 RX ADMIN — Medication 5000 UNITS: at 09:10

## 2022-01-23 RX ADMIN — Medication 400 MG: at 09:09

## 2022-01-23 RX ADMIN — RIVAROXABAN 15 MG: 15 TABLET, FILM COATED ORAL at 09:09

## 2022-01-23 RX ADMIN — RIVAROXABAN 15 MG: 15 TABLET, FILM COATED ORAL at 16:52

## 2022-01-23 RX ADMIN — IPRATROPIUM BROMIDE AND ALBUTEROL 1 PUFF: 20; 100 SPRAY, METERED RESPIRATORY (INHALATION) at 07:27

## 2022-01-23 RX ADMIN — IPRATROPIUM BROMIDE AND ALBUTEROL 1 PUFF: 20; 100 SPRAY, METERED RESPIRATORY (INHALATION) at 03:08

## 2022-01-23 RX ADMIN — METOPROLOL TARTRATE 12.5 MG: 25 TABLET, FILM COATED ORAL at 09:10

## 2022-01-23 NOTE — PROGRESS NOTES
Speech Pathology:     Orders received. Pt evaluated by SLP during this admission with recs of regular solid diet and thin liquids. Will discontinue orders. Please re-consult if needed.      Thank you for this referral.    Nicole Donnelly M.S. CCC-SLP/L  Speech-Language Pathologist

## 2022-01-23 NOTE — PROGRESS NOTES
The  provided the following Interventions:   helped the patient Michael Butt, who is a 46 y.o.,male, connect with the family with the Zoom Video Connection. The following outcomes were achieved:  Mr. Glass Pod coping was enhanced by his opportunity to connect with his family. Assessment:  There are no further spiritual or Anabaptist issues which require Spiritual Care Services interventions at this time. Plan:  Chaplains will continue to follow and will provide pastoral care on an as needed/requested basis.  recommends bedside caregivers page  on duty if patient shows signs of acute spiritual or emotional distress.      [address] [place]  [phone]

## 2022-01-23 NOTE — ROUTINE PROCESS
0739-/Bedside and Verbal shift change report given to Aretha (oncoming nurse) by Seven Garcia (offgoing nurse). Report included the following information SBAR, MAR and Recent Results. 1004-currently at 88% oxygen saturation on 7L of O2.    1915-/Bedside and Verbal shift change report given to Lisa (oncoming nurse) by Charline Mir (offgoing nurse). Report included the following information SBAR, MAR and Recent Results.

## 2022-01-23 NOTE — PROGRESS NOTES
Creat slightly up   Poor intake   K borderline high , switch to low k diet  Monitor K   Following closely  Renal panel in AM    Please call with questions    Jerry Harmon MD FASN  Cell [phone]  Pager: [phone]

## 2022-01-23 NOTE — PROGRESS NOTES
Chart reviewed, patient is on 9L oxygen. ARU continues to follow at this time.     Briseyda Faria RN

## 2022-01-23 NOTE — PROGRESS NOTES
NEW PT orders received and chart reviewed. Pt currently on caseload. Will acknowledge new orders and continue to follow.      Thank you for this referral   Kimberley Moise PT DPT

## 2022-01-23 NOTE — PROGRESS NOTES
Hospitalist Progress Note    Subjective:   Daily Progress Note: 1/23/2022     Patient feels fine. Denies any pain. Saturating 91% is on 9 L. No nausea or vomiting. No obvious cough. Follows commands appropriately. No new issues per nursing.     Current Facility-Administered Medications   Medication Dose Route Frequency    mirtazapine (REMERON) tablet 7.5 mg  7.5 mg Oral QHS    [START ON 1/24/2022] insulin glargine (LANTUS) injection 45 Units  45 Units SubCUTAneous DAILY    insulin glargine (LANTUS) injection 5 Units  5 Units SubCUTAneous NOW    metoprolol tartrate (LOPRESSOR) tablet 12.5 mg  12.5 mg Oral Q12H    dexAMETHasone (DECADRON) tablet 6 mg  6 mg Oral DAILY    dextrose 10% infusion 125-250 mL  125-250 mL IntraVENous PRN    ipratropium-albuterol (COMBIVENT RESPIMAT) 20 mcg-100 mcg inhalation spray  1 Puff Inhalation Q4H RT    hydrALAZINE (APRESOLINE) 20 mg/mL injection 10 mg  10 mg IntraVENous Q6H PRN    [Held by provider] amLODIPine (NORVASC) tablet 10 mg  10 mg Oral DAILY    rivaroxaban (XARELTO) tablet 15 mg  15 mg Oral BID WITH MEALS    sodium chloride (NS) flush 5-40 mL  5-40 mL IntraVENous Q8H    sodium chloride (NS) flush 5-40 mL  5-40 mL IntraVENous PRN    acetaminophen (TYLENOL) tablet 650 mg  650 mg Oral Q6H PRN    Or    acetaminophen (TYLENOL) suppository 650 mg  650 mg Rectal Q6H PRN    polyethylene glycol (MIRALAX) packet 17 g  17 g Oral DAILY PRN    ondansetron (ZOFRAN ODT) tablet 4 mg  4 mg Oral Q8H PRN    Or    ondansetron (ZOFRAN) injection 4 mg  4 mg IntraVENous Q6H PRN    insulin lispro (HUMALOG) injection   SubCUTAneous AC&HS    glucose chewable tablet 16 g  4 Tablet Oral PRN    glucagon (GLUCAGEN) injection 1 mg  1 mg IntraMUSCular PRN    famotidine (PEPCID) tablet 20 mg  20 mg Oral DAILY    melatonin tablet 5 mg  5 mg Oral QHS    cholecalciferol (VITAMIN D3) capsule 5,000 Units  5,000 Units Oral DAILY        Review of Systems  A comprehensive review of systems was negative except for that written in the HPI. Objective:     Visit Vitals  /84   Pulse 80   Temp 98 °F (36.7 °C)   Resp 18   Ht 5' 11\" (1.803 m)   Wt 93.4 kg (206 lb)   SpO2 91%   BMI 28.73 kg/m²    O2 Flow Rate (L/min): 9 l/min O2 Device: Hi flow nasal cannula    Temp (24hrs), Av.4 °F (36.9 °C), Min:98 °F (36.7 °C), Max:99.1 °F (37.3 °C)      No intake/output data recorded. No intake/output data recorded. General appearance -awake, follows commands appropriately, not in acute distress, oxygen is in situ currently. Chest -clear air entry noted in bases, no wheezes  Heart - S1 and S2 normal  Abdomen - soft, nontender, nondistended, Bowel sounds present  Neurological -awake, follows commands, motor strength 1 out of 5 on right upper and lower extremity, no tremors noted.   Musculoskeletal - no joint tenderness or swelling of knees bilaterally  Extremities - no pedal edema noted      Data Review    Recent Results (from the past 24 hour(s))   GLUCOSE, POC    Collection Time: 22 12:46 PM   Result Value Ref Range    Glucose (POC) 123 (H) 70 - 110 mg/dL   GLUCOSE, POC    Collection Time: 22  5:43 PM   Result Value Ref Range    Glucose (POC) 132 (H) 70 - 110 mg/dL   GLUCOSE, POC    Collection Time: 22  9:25 PM   Result Value Ref Range    Glucose (POC) 202 (H) 70 - 110 mg/dL   MAGNESIUM    Collection Time: 22  1:09 AM   Result Value Ref Range    Magnesium 2.7 (H) 1.6 - 2.6 mg/dL   METABOLIC PANEL, BASIC    Collection Time: 22  3:47 AM   Result Value Ref Range    Sodium 132 (L) 136 - 145 mmol/L    Potassium 5.5 3.5 - 5.5 mmol/L    Chloride 103 100 - 111 mmol/L    CO2 22 21 - 32 mmol/L    Anion gap 7 3.0 - 18 mmol/L    Glucose 196 (H) 74 - 99 mg/dL    BUN 49 (H) 7.0 - 18 MG/DL    Creatinine 1.70 (H) 0.6 - 1.3 MG/DL    BUN/Creatinine ratio 29 (H) 12 - 20      GFR est AA 52 (L) >60 ml/min/1.73m2    GFR est non-AA 43 (L) >60 ml/min/1.73m2    Calcium 9.1 8.5 - 10.1 MG/DL GLUCOSE, POC    Collection Time: 01/23/22  6:53 AM   Result Value Ref Range    Glucose (POC) 184 (H) 70 - 110 mg/dL         Assessment/Plan:     Active Problems:    Pneumonia due to COVID-19 virus (1/10/2022)      Acute respiratory failure with hypoxia (HCC) (1/11/2022)      Acute deep vein thrombosis (DVT) of both lower extremities ([address] (1/11/2022)      Hypercoagulable state associated with COVID-19 ([facility] [address] (1/11/2022)      MONICA (acute kidney injury) ([facility] [address] (1/11/2022)      Non-traumatic rhabdomyolysis (1/12/2022)      ASSESSMENT:    1.  Acute respiratory failure with hypoxia due to COVID-19 infection   2.  COVID-19 pneumonia   3.  MONICA on CKD3, associate with covid infection, resolving  4.  Elevated troponin, demand ischemia,   5.  Hyperglycemia with T2DM, HgbA1c 7.8%  6.  Acute DVT on legs, associate hypercoagulable state due to covid   7.  H/o stroke with right-sided hemiparesis   8. Stage 2 pressure ulcer of the gluteal cleft, POA  9. Hypomagnesemia, resolved    PLAN:    Speech therapist to evaluate this patient  Magnesium is now normal so we will discontinue supplement  Continue oxygen and wean this patient off oxygen. Continue Combivent  Continue Lantus and insulin sliding scale -Will increase the dose today  Continue Xarelto for DVT  We will add Remeron to treat low appetite as well as depression  Patient has completed IV Decadron course until January 19, 2022. Due to patient still requiring significant amount of oxygen, will put him on p.o. steroid for 5 days and monitor. PT and OT to follow this patient  Resume low-dose metoprolol and monitor this patient. Spoke to patient's wife over the phone and updated her about patient's current clinical condition.     Anticipated date of discharge: ARU around January 26, 2022 if his oxygen requirement remained stable as well as downtrending    Disclaimer: Sections of this note are dictated using utilizing voice recognition software, which may have resulted in some phonetic based errors in grammar and contents. Even though attempts were made to correct all the mistakes, some may have been missed, and remained in the body of the document. If questions arise, please contact our department. Care Plan discussed with: Patient/Family and Nurse    Total time spent with patient: 30 minutes.

## 2022-01-23 NOTE — PROGRESS NOTES
01/23/22 0727   Oxygen Therapy   O2 Sat (%) 92 %   O2 Device Hi flow nasal cannula  (salter)   O2 Flow Rate (L/min) 7 l/min  (pt was on 7 LPM upon entering the room)

## 2022-01-24 LAB
ALBUMIN SERPL-MCNC: 2.6 G/DL (ref 3.4–5)
ANION GAP SERPL CALC-SCNC: 5 MMOL/L (ref 3–18)
BUN SERPL-MCNC: 52 MG/DL (ref 7–18)
BUN/CREAT SERPL: 28 (ref 12–20)
CALCIUM SERPL-MCNC: 9.6 MG/DL (ref 8.5–10.1)
CHLORIDE SERPL-SCNC: 103 MMOL/L (ref 100–111)
CO2 SERPL-SCNC: 30 MMOL/L (ref 21–32)
CREAT SERPL-MCNC: 1.88 MG/DL (ref 0.6–1.3)
GLUCOSE BLD STRIP.AUTO-MCNC: 202 MG/DL (ref 70–110)
GLUCOSE BLD STRIP.AUTO-MCNC: 217 MG/DL (ref 70–110)
GLUCOSE BLD STRIP.AUTO-MCNC: 264 MG/DL (ref 70–110)
GLUCOSE BLD STRIP.AUTO-MCNC: 94 MG/DL (ref 70–110)
GLUCOSE BLD STRIP.AUTO-MCNC: 98 MG/DL (ref 70–110)
GLUCOSE SERPL-MCNC: 105 MG/DL (ref 74–99)
PHOSPHATE SERPL-MCNC: 3.6 MG/DL (ref 2.5–4.9)
POTASSIUM SERPL-SCNC: 4.6 MMOL/L (ref 3.5–5.5)
SODIUM SERPL-SCNC: 138 MMOL/L (ref 136–145)

## 2022-01-24 PROCEDURE — 74011250637 HC RX REV CODE- 250/637: Performed by: STUDENT IN AN ORGANIZED HEALTH CARE EDUCATION/TRAINING PROGRAM

## 2022-01-24 PROCEDURE — 97530 THERAPEUTIC ACTIVITIES: CPT

## 2022-01-24 PROCEDURE — 36415 COLL VENOUS BLD VENIPUNCTURE: CPT

## 2022-01-24 PROCEDURE — 94640 AIRWAY INHALATION TREATMENT: CPT

## 2022-01-24 PROCEDURE — 94760 N-INVAS EAR/PLS OXIMETRY 1: CPT

## 2022-01-24 PROCEDURE — 74011250637 HC RX REV CODE- 250/637: Performed by: INTERNAL MEDICINE

## 2022-01-24 PROCEDURE — 65660000000 HC RM CCU STEPDOWN

## 2022-01-24 PROCEDURE — 74011250637 HC RX REV CODE- 250/637: Performed by: FAMILY MEDICINE

## 2022-01-24 PROCEDURE — 74011636637 HC RX REV CODE- 636/637: Performed by: INTERNAL MEDICINE

## 2022-01-24 PROCEDURE — 2709999900 HC NON-CHARGEABLE SUPPLY

## 2022-01-24 PROCEDURE — 97535 SELF CARE MNGMENT TRAINING: CPT

## 2022-01-24 PROCEDURE — 99232 SBSQ HOSP IP/OBS MODERATE 35: CPT | Performed by: INTERNAL MEDICINE

## 2022-01-24 PROCEDURE — 82962 GLUCOSE BLOOD TEST: CPT

## 2022-01-24 PROCEDURE — 80069 RENAL FUNCTION PANEL: CPT

## 2022-01-24 PROCEDURE — 74011000250 HC RX REV CODE- 250: Performed by: HOSPITALIST

## 2022-01-24 PROCEDURE — 74011250636 HC RX REV CODE- 250/636: Performed by: INTERNAL MEDICINE

## 2022-01-24 RX ORDER — SODIUM CHLORIDE 450 MG/100ML
75 INJECTION, SOLUTION INTRAVENOUS CONTINUOUS
Status: DISCONTINUED | OUTPATIENT
Start: 2022-01-24 | End: 2022-01-25

## 2022-01-24 RX ADMIN — IPRATROPIUM BROMIDE AND ALBUTEROL 1 PUFF: 20; 100 SPRAY, METERED RESPIRATORY (INHALATION) at 12:00

## 2022-01-24 RX ADMIN — METOPROLOL TARTRATE 12.5 MG: 25 TABLET, FILM COATED ORAL at 09:43

## 2022-01-24 RX ADMIN — Medication 45 UNITS: at 09:00

## 2022-01-24 RX ADMIN — IPRATROPIUM BROMIDE AND ALBUTEROL 1 PUFF: 20; 100 SPRAY, METERED RESPIRATORY (INHALATION) at 16:00

## 2022-01-24 RX ADMIN — RIVAROXABAN 15 MG: 15 TABLET, FILM COATED ORAL at 09:43

## 2022-01-24 RX ADMIN — Medication 5000 UNITS: at 09:43

## 2022-01-24 RX ADMIN — Medication 9 UNITS: at 21:42

## 2022-01-24 RX ADMIN — IPRATROPIUM BROMIDE AND ALBUTEROL 1 PUFF: 20; 100 SPRAY, METERED RESPIRATORY (INHALATION) at 08:00

## 2022-01-24 RX ADMIN — FAMOTIDINE 20 MG: 20 TABLET ORAL at 09:46

## 2022-01-24 RX ADMIN — Medication 5 MG: at 21:41

## 2022-01-24 RX ADMIN — DEXAMETHASONE 6 MG: 2 TABLET ORAL at 09:43

## 2022-01-24 RX ADMIN — Medication 6 UNITS: at 16:52

## 2022-01-24 RX ADMIN — Medication 6 UNITS: at 11:30

## 2022-01-24 RX ADMIN — METOPROLOL TARTRATE 12.5 MG: 25 TABLET, FILM COATED ORAL at 21:41

## 2022-01-24 RX ADMIN — IPRATROPIUM BROMIDE AND ALBUTEROL 1 PUFF: 20; 100 SPRAY, METERED RESPIRATORY (INHALATION) at 19:52

## 2022-01-24 RX ADMIN — RIVAROXABAN 15 MG: 15 TABLET, FILM COATED ORAL at 16:52

## 2022-01-24 RX ADMIN — MIRTAZAPINE 7.5 MG: 15 TABLET, FILM COATED ORAL at 21:42

## 2022-01-24 RX ADMIN — SODIUM CHLORIDE, PRESERVATIVE FREE 10 ML: 5 INJECTION INTRAVENOUS at 06:39

## 2022-01-24 NOTE — PROGRESS NOTES
Problem: Self Care Deficits Care Plan (Adult)  Goal: *Acute Goals and Plan of Care (Insert Text)  Description: Occupational Therapy Goals  Initiated 1/18/2022 within 7 day(s). 1.  Patient will perform grooming with supervision/set-up. 2.  Patient will perform bathing with minimal assistance/contact guard assist.  3.  Patient will perform upper body dressing and lower body dressing with minimal assistance/contact guard assist.  4.  Patient will perform bedside commode transfers with minimal assistance/contact guard assist using LRAD. 5.  Patient will perform all aspects of toileting with minimal assistance/contact guard assist.  6.  Patient will participate in upper extremity therapeutic exercise/activities with supervision/set-up for 8 minutes. 7.  Patient will utilize energy conservation techniques during functional activities with min verbal cues. Prior Level of Function: pt demonstrating difficulty providing accurate PLOF e.g. use of AD for functional mobility - pt states he used a walker and also states he does not use a walker. Pt indicated that his spouse assists with shower transfer and bathing tasks while seated on shower seat and spouse also assists with dressing tasks. Outcome: Progressing Towards Goal   OCCUPATIONAL THERAPY TREATMENT    Patient: Michael Butt (87 y.o. male)  Date: 1/24/2022  Diagnosis: COVID [U07.1]  Pneumonia due to COVID-19 virus [U07.1, J12.82] <principal problem not specified>       Precautions: Fall,Contact,Other (comment) (droplet+)    Chart, occupational therapy assessment, plan of care, and goals were reviewed. ASSESSMENT:  Pt appears brighter today. Oriented to self and place. On 7L O2. Pt agreeable to EOB activity. Pt demonstrates improved command following this date. Pt requires assist w/trunk 2/2 R-side plegia to maneuver to EOB. Pt tolerates ~ 10 minutes sitting EOB performing ADL grooming tasks w/close guarding.  Pt requires max vc's for sequencing oral care task. Pt returned to supine for chux pad change. Pt w/non productive cough throughout session. No c/o pain w/movement. Progression toward goals:  []          Improving appropriately and progressing toward goals  [x]          Improving slowly and progressing toward goals  []          Not making progress toward goals and plan of care will be adjusted     PLAN:  Patient continues to benefit from skilled intervention to address the above impairments. Continue treatment per established plan of care. Discharge Recommendations:  Rehab  Further Equipment Recommendations for Discharge:  possibly wheelchair for community  re-entry     SUBJECTIVE:   Patient stated I don't know the name of it.  orientation for place    OBJECTIVE DATA SUMMARY:   Cognitive/Behavioral Status:  Neurologic State: Alert  Orientation Level: Oriented to person,Oriented to place (place \"hospital\")  Cognition: Follows commands  Safety/Judgement: Fall prevention    Functional Mobility and Transfers for ADLs:   Bed Mobility:  Rolling:Min Assist  Supine to Sit: Moderate assistance; Additional time;Bed Modified  Sit to Supine: Moderate assistance (assist w/BLE)  Scooting: Stand-by assistance (supine to [facility])     Balance:  Sitting: Impaired; Without support    ADL Intervention:  Grooming  Position Performed: Seated edge of bed  Washing Face: Supervision  Washing Hands: Supervision  Brushing Teeth: Supervision    Upper Body [address]: Minimum  assistance (at bed level)    Neuro Re-Education:  Sitting EOB ~ 10 minutes      Pain:  Pain level pre-treatment: 0/10   Pain level post-treatment: 0/10    Activity Tolerance:    Good    Please refer to the flowsheet for vital signs taken during this treatment.   After treatment:   []  Patient left in no apparent distress sitting up in chair  [x]  Patient left in no apparent distress in bed  [x]  Call bell left within reach  []  Nursing notified  []  Caregiver present  [x]  Bed alarm activated    COMMUNICATION/EDUCATION:   [] Role of Occupational Therapy in the acute care setting  [] Home safety education was provided and the patient/caregiver indicated understanding. [] Patient/family have participated as able in working towards goals and plan of care. [x] Patient/family agree to work toward stated goals and plan of care. [] Patient understands intent and goals of therapy, but is neutral about his/her participation. [] Patient is unable to participate in goal setting and plan of care.       Thank you for this referral.  LIZ Arias  Time Calculation: 23 mins

## 2022-01-24 NOTE — DIABETES MGMT
Diabetes/ Glycemic Control Plan of Care  Recommendation(s):  1. Continue 45 units Lantus/day and corrective lispro, very insulin resistant dosing ACHS. 2. Will  follow up 1/25/22 to reassess appetite status. When pt taking > 50% meals, consider small dose of meal time insulin starting with 3 units lispro TID AC to prevent meal time blood glucose excursions. Assessment:   Pt with known history of T2DM (poorly controlled with A1C - 7.8%; home regimen of combination pill - DPP-4i  and metformin). Receiving basal and corrective insulin; Noted basal insulin dose has been gradually  increased to current dose of 45 units daily. Steroid associated hyperglycemia. FBG this morning -  98 mg/dL. Pt having meal time blood glucose excursions. Pt required 28 units corrective lispro yesterday. Variable po intake per I/O's (less than 50% meals per I/O's;  100% supplement 1/24/22). Attempted to call pt to verify appetite status but no answer. Spoke with CNA who confirmed pt taking < 50% meals and inconsistently taking po supplements and usually only with encouragement and prompting. DX:   1. COVID     2. Acute respiratory failure with hypoxia (HCC)     3. MONICA (acute kidney injury) ([facility] [address]     4. Hypercoagulable state associated with COVID-19 ([facility] [address]     5. Pneumonia due to COVID-19 virus     6.  Acute deep vein thrombosis (DVT) of distal vein of both lower extremities (HCC)        Fasting/ Morning blood glucose:   Lab Results   Component Value Date/Time    Glucose 105 (H) 01/24/2022 10:32 AM    Glucose (POC) 217 (H) 01/24/2022 11:58 AM     IV Fluids containing dextrose: none  Steroids:  Decadron 6 mg/day    Blood glucose values:   1/24:  Lab - 105;  POC - 98, 217  1/23:  Lab - 196;  POC - 184, 305, 241, 248    Within target range (70-180mg/dL):  no    Current insulin orders:   lantus 45 units daily  Corrective humalog, very insulin resistant, 4 times daily    Total Daily Dose previous 24 hours = 72 units (45 Lantus, 27 corrective lispro)    Current A1c:   Lab Results   Component Value Date/Time    Hemoglobin A1c 7.8 (H) 01/10/2022 05:10 AM      equivalent  to ave Blood Glucose of  177 mg/dl for 2-3 months prior to admission  Adequate glycemic control PTA: no    Nutrition/Diet:   Active Orders   Diet    ADULT DIET Regular; 4 carb choices (60 gm/meal); Low Fat/Low Chol/High Fiber/2 gm Na; Low Potassium (Less than 3000 mg/day); No Concentrated Sweets      Meal Intake:  Patient Vitals for the past 168 hrs:   % Diet Eaten   01/24/22 1315 1 - 25%   01/24/22 0825 26 - 50%   01/18/22 1227 76 - 100%   01/17/22 1729 1 - 25%     Supplement Intake:  Patient Vitals for the past 168 hrs:   Supplement intake %   01/24/22 1315 76 - 100%       Home diabetes medications:   Key Antihyperglycemic Medications             SITagliptin-metFORMIN (JANUMET) 50-1,000 mg per tablet Take 1 Tab by mouth two (2) times daily (with meals). Plan/Goals:   Blood glucose will be within target of 70 - 180 mg/dl within 72 hours  Reinforce dietary and medication compliance at home.        Education:  [] Refer to Diabetes Education Record                       [x] Education not indicated at this time   Ángel Henley  MS, RD, [facility]  Diabetes and Glycemic Control   [employer]
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
## 2022-01-24 NOTE — PROGRESS NOTES
In Patient Progress note    Admit Date: 1/9/2022    Impression:     #1 Acute kidney injury, baseline creatinine 2019 was 1.5, no labs available since   then. --> improved , d/t prerenal azotemia Renal functions close to baseline, CT   abd with no hydro   #2. Acute hypoxic respiratory failure secondary to COVID-19 pneumonia  #3 COVID-19 pneumonia  #4 hypertension  #5 type 2 diabetes  #6 elevated troponins  #7 transaminitis  #8 hypoalbuminemia  #9 echocardiogram with normal EF but concentric hypertrophy likely from hypertension    Creatinine slightly up  Poor intake   needing 7 lit O2      Plan:  #1 strict ins and outs, daily weights  #2 encourage po intake   #3 wean O2 as tolerated  #4 1/2 NS @ 75 cc/hrs X 12 hrs   #5 avoid IV contrast nephrotoxins  #6 renally dose medications for EGFR of less than 30  #7 follow ID recommendations    Please call with questions,     Marii Espinoza MD FASN  Cell [phone]  Pager: [phone]      Subjective:     - respiratory - HFNC  - hemodynamics - stable, no pressrs  - UOP-better  - Nutrition -poor     Objective:     Visit Vitals  /82   Pulse 80   Temp 98 °F (36.7 °C)   Resp 18   Ht 5' 11\" (1.803 m)   Wt 93.9 kg (207 lb)   SpO2 94%   BMI 28.87 kg/m²         Intake/Output Summary (Last 24 hours) at 1/24/2022 1806  Last data filed at 1/24/2022 1315  Gross per 24 hour   Intake 537 ml   Output --   Net 537 ml       Physical Exam:     HFNC   No distress   HEENT: mmm  Lungs: alicja coarse BS   Cardiovascular system: S1, S2, regular rate and rhythm. Abdomen: soft, non tender, non distended. Extremities: no edema   Integumentary: skin is grossly intact. Neurologic: Alert, oriented time three.     Data Review:    Recent Labs     01/22/22  0133   WBC 11.2   RBC 4.75   HCT 39.9   MCV 84.0   MCH 27.8   MCHC 33.1   RDW 13.3     Recent Labs     01/24/22  1032 01/23/22  0347 01/22/22  0133   BUN 52* 49* 43*   CREA 1.88* 1.70* 1.52*   CA 9.6 9.1 9.0   ALB 2.6*  --   --    K 4.6 5.5 4.7  132* 132*    103 104   CO2 30 22 23   PHOS 3.6  --   --    * 196* 95       Juan Carlos Ortiz MD

## 2022-01-25 LAB
ALBUMIN SERPL-MCNC: 2.3 G/DL (ref 3.4–5)
ANION GAP SERPL CALC-SCNC: 7 MMOL/L (ref 3–18)
BUN SERPL-MCNC: 49 MG/DL (ref 7–18)
BUN/CREAT SERPL: 30 (ref 12–20)
CALCIUM SERPL-MCNC: 9.1 MG/DL (ref 8.5–10.1)
CHLORIDE SERPL-SCNC: 103 MMOL/L (ref 100–111)
CO2 SERPL-SCNC: 23 MMOL/L (ref 21–32)
CREAT SERPL-MCNC: 1.64 MG/DL (ref 0.6–1.3)
GLUCOSE BLD STRIP.AUTO-MCNC: 182 MG/DL (ref 70–110)
GLUCOSE BLD STRIP.AUTO-MCNC: 226 MG/DL (ref 70–110)
GLUCOSE BLD STRIP.AUTO-MCNC: 287 MG/DL (ref 70–110)
GLUCOSE BLD STRIP.AUTO-MCNC: 300 MG/DL (ref 70–110)
GLUCOSE SERPL-MCNC: 269 MG/DL (ref 74–99)
PHOSPHATE SERPL-MCNC: 4.3 MG/DL (ref 2.5–4.9)
POTASSIUM SERPL-SCNC: 5.3 MMOL/L (ref 3.5–5.5)
SODIUM SERPL-SCNC: 133 MMOL/L (ref 136–145)

## 2022-01-25 PROCEDURE — 74011250637 HC RX REV CODE- 250/637: Performed by: STUDENT IN AN ORGANIZED HEALTH CARE EDUCATION/TRAINING PROGRAM

## 2022-01-25 PROCEDURE — 74011250637 HC RX REV CODE- 250/637: Performed by: INTERNAL MEDICINE

## 2022-01-25 PROCEDURE — 94760 N-INVAS EAR/PLS OXIMETRY 1: CPT

## 2022-01-25 PROCEDURE — 99232 SBSQ HOSP IP/OBS MODERATE 35: CPT | Performed by: EMERGENCY MEDICINE

## 2022-01-25 PROCEDURE — 94640 AIRWAY INHALATION TREATMENT: CPT

## 2022-01-25 PROCEDURE — 36415 COLL VENOUS BLD VENIPUNCTURE: CPT

## 2022-01-25 PROCEDURE — 2709999900 HC NON-CHARGEABLE SUPPLY

## 2022-01-25 PROCEDURE — 74011000250 HC RX REV CODE- 250: Performed by: INTERNAL MEDICINE

## 2022-01-25 PROCEDURE — 74011250637 HC RX REV CODE- 250/637: Performed by: FAMILY MEDICINE

## 2022-01-25 PROCEDURE — 97164 PT RE-EVAL EST PLAN CARE: CPT

## 2022-01-25 PROCEDURE — 74011636637 HC RX REV CODE- 636/637: Performed by: INTERNAL MEDICINE

## 2022-01-25 PROCEDURE — 97530 THERAPEUTIC ACTIVITIES: CPT

## 2022-01-25 PROCEDURE — 97168 OT RE-EVAL EST PLAN CARE: CPT

## 2022-01-25 PROCEDURE — 77030040392 HC DRSG OPTIFOAM MDII -A

## 2022-01-25 PROCEDURE — 74011250637 HC RX REV CODE- 250/637: Performed by: EMERGENCY MEDICINE

## 2022-01-25 PROCEDURE — 97535 SELF CARE MNGMENT TRAINING: CPT

## 2022-01-25 PROCEDURE — 74011250636 HC RX REV CODE- 250/636: Performed by: INTERNAL MEDICINE

## 2022-01-25 PROCEDURE — 80069 RENAL FUNCTION PANEL: CPT

## 2022-01-25 PROCEDURE — 74011000250 HC RX REV CODE- 250: Performed by: HOSPITALIST

## 2022-01-25 PROCEDURE — 82962 GLUCOSE BLOOD TEST: CPT

## 2022-01-25 PROCEDURE — 77010033711 HC HIGH FLOW OXYGEN

## 2022-01-25 PROCEDURE — 65660000000 HC RM CCU STEPDOWN

## 2022-01-25 RX ORDER — INSULIN LISPRO 100 [IU]/ML
3 INJECTION, SOLUTION INTRAVENOUS; SUBCUTANEOUS
Status: DISCONTINUED | OUTPATIENT
Start: 2022-01-26 | End: 2022-01-28

## 2022-01-25 RX ORDER — HEPARIN SODIUM 1000 [USP'U]/ML
INJECTION, SOLUTION INTRAVENOUS; SUBCUTANEOUS
Status: DISCONTINUED
Start: 2022-01-25 | End: 2022-01-25 | Stop reason: CLARIF

## 2022-01-25 RX ADMIN — IPRATROPIUM BROMIDE AND ALBUTEROL 1 PUFF: 20; 100 SPRAY, METERED RESPIRATORY (INHALATION) at 15:06

## 2022-01-25 RX ADMIN — Medication 5000 UNITS: at 09:34

## 2022-01-25 RX ADMIN — MIRTAZAPINE 7.5 MG: 15 TABLET, FILM COATED ORAL at 21:43

## 2022-01-25 RX ADMIN — SODIUM CHLORIDE, PRESERVATIVE FREE 10 ML: 5 INJECTION INTRAVENOUS at 21:39

## 2022-01-25 RX ADMIN — Medication 9 UNITS: at 22:06

## 2022-01-25 RX ADMIN — IPRATROPIUM BROMIDE AND ALBUTEROL 1 PUFF: 20; 100 SPRAY, METERED RESPIRATORY (INHALATION) at 00:03

## 2022-01-25 RX ADMIN — FAMOTIDINE 20 MG: 20 TABLET ORAL at 09:34

## 2022-01-25 RX ADMIN — SODIUM CHLORIDE, PRESERVATIVE FREE 10 ML: 5 INJECTION INTRAVENOUS at 15:09

## 2022-01-25 RX ADMIN — SODIUM CHLORIDE, PRESERVATIVE FREE 10 ML: 5 INJECTION INTRAVENOUS at 05:29

## 2022-01-25 RX ADMIN — METOPROLOL TARTRATE 12.5 MG: 25 TABLET, FILM COATED ORAL at 21:42

## 2022-01-25 RX ADMIN — IPRATROPIUM BROMIDE AND ALBUTEROL 1 PUFF: 20; 100 SPRAY, METERED RESPIRATORY (INHALATION) at 08:33

## 2022-01-25 RX ADMIN — Medication 5 MG: at 21:43

## 2022-01-25 RX ADMIN — Medication 6 UNITS: at 16:06

## 2022-01-25 RX ADMIN — DEXAMETHASONE 6 MG: 2 TABLET ORAL at 09:34

## 2022-01-25 RX ADMIN — Medication 45 UNITS: at 09:35

## 2022-01-25 RX ADMIN — Medication 3 UNITS: at 07:52

## 2022-01-25 RX ADMIN — RIVAROXABAN 15 MG: 15 TABLET, FILM COATED ORAL at 16:07

## 2022-01-25 RX ADMIN — SODIUM CHLORIDE 75 ML/HR: 450 INJECTION, SOLUTION INTRAVENOUS at 05:37

## 2022-01-25 RX ADMIN — Medication 12 UNITS: at 12:07

## 2022-01-25 RX ADMIN — IPRATROPIUM BROMIDE AND ALBUTEROL 1 PUFF: 20; 100 SPRAY, METERED RESPIRATORY (INHALATION) at 20:13

## 2022-01-25 RX ADMIN — IPRATROPIUM BROMIDE AND ALBUTEROL 1 PUFF: 20; 100 SPRAY, METERED RESPIRATORY (INHALATION) at 12:23

## 2022-01-25 RX ADMIN — METOPROLOL TARTRATE 12.5 MG: 25 TABLET, FILM COATED ORAL at 09:35

## 2022-01-25 RX ADMIN — RIVAROXABAN 15 MG: 15 TABLET, FILM COATED ORAL at 09:34

## 2022-01-25 NOTE — DIABETES MGMT
Diabetes/ Glycemic Control Plan of Care  Recommendation(s):  1. Suggest adding low dose of scheduled meal time insulin starting with 3 units lispro TID AC to minimize prandial BG excursions (1/24 prandial BG - 217, 202, 264 mg/dL and 1/25: pre-lunch BG - 300 mg/dL) and need for 21 units corrective lispro yesterday). 2. Continue 45 units Lantus/day and corrective lispro, very insulin resistant dosing ACHS. 3. Reassess when steroids are completed. Assessment:   Pt with known history of T2DM (poorly controlled with A1C - 7.8%; home regimen of combination pill - DPP-4i  and metformin). Steroid associated hyperglycemia. Orders for last dose of Decadron 1/26/22. FBG this morning -  182 mg/dL (FBG yesterday was 98 mg/dL). Pt having meal time blood glucose excursions. D/w RN and CNA who relate pt is usually taking > 50% meals and /or po supplements. He sometimes does not eat well if he does not like what he has received and in that case another meal is obtained to his liking. May benefit from receiving scheduled meal time lispro. DX:   1. COVID     2. Acute respiratory failure with hypoxia (HCC)     3. MONICA (acute kidney injury) ([facility] [address]     4. Hypercoagulable state associated with COVID-19 ([facility] [address]     5. Pneumonia due to COVID-19 virus     6.  Acute deep vein thrombosis (DVT) of distal vein of both lower extremities (HCC)        Fasting/ Morning blood glucose:   Lab Results   Component Value Date/Time    Glucose 269 (H) 01/25/2022 01:51 AM    Glucose (POC) 300 (H) 01/25/2022 11:54 AM     IV Fluids containing dextrose: none  Steroids:  Decadron 6 mg/day (last dose 1/26/22 at 0900)    Blood glucose values:   1/25:  POC - 182, 300  1/24:  Lab - 105;  POC - 98, 217, 202, 264  1/23:  Lab - 196;  POC - 184, 305, 241, 248    Within target range (70-180mg/dL):  no    Current insulin orders:   lantus 45 units daily  Corrective humalog, very insulin resistant, 4 times daily    Total Daily Dose previous 24 hours =  66 units (45 Lantus, 21 corrective lispro)      Current A1c:   Lab Results   Component Value Date/Time    Hemoglobin A1c 7.8 (H) 01/10/2022 05:10 AM      equivalent  to ave Blood Glucose of  177 mg/dl for 2-3 months prior to admission  Adequate glycemic control PTA: no    Nutrition/Diet:   Active Orders   Diet    ADULT DIET Regular; 4 carb choices (60 gm/meal); Low Fat/Low Chol/High Fiber/2 gm Na; Low Potassium (Less than 3000 mg/day); No Concentrated Sweets      Meal Intake:  Patient Vitals for the past 168 hrs:   % Diet Eaten   01/25/22 1225 76 - 100%   01/25/22 0805 26 - 50%   01/24/22 1659 0%   01/24/22 1315 1 - 25%   01/24/22 0825 26 - 50%     Supplement Intake:  Patient Vitals for the past 168 hrs:   Supplement intake %   01/25/22 0805 76 - 100%   01/24/22 1659 0%   01/24/22 1315 76 - 100%       Home diabetes medications:   Key Antihyperglycemic Medications             SITagliptin-metFORMIN (JANUMET) 50-1,000 mg per tablet Take 1 Tab by mouth two (2) times daily (with meals). Plan/Goals:   Blood glucose will be within target of 70 - 180 mg/dl within 72 hours  Reinforce dietary and medication compliance at home. Education:  [] Refer to Diabetes Education Record                       [x] Education not indicated at this time   1/24/22:Variable po intake per I/O's (less than 50% meals per I/O's;  100% supplement 1/24/22). Attempted to call pt to verify appetite status but no answer. Spoke with CNA who confirmed pt taking < 50% meals and inconsistently taking po supplements and usually only with encouragement and prompting.       Heather Osuna,  MS, RD, [facility]  Diabetes and Glycemic Control   [employer]

## 2022-01-25 NOTE — PROGRESS NOTES
Nutrition Assessment (Disaster Mode)    Type and Reason for Visit: Reassess,Wound,RD nutrition re-screen/LOS    Nutrition Recommendations/Plan:   - Modify supplement: increase Glucerna Shake to TID  - Continue all other nutrition interventions. Encourage/ monitor po intake of meals and supplements. Malnutrition Assessment:  Malnutrition Status: At risk for malnutrition (specify) (fair po intake since admission)    Nutrition Assessment:   Nutrition Assessment: Pt has fair po intake some meals, poor others per nursing; sometimes pt asks for alternative if dislikes the meal he receives and will end up eating that alternative meal. Overall fair meal intake. Likes/ consuming glucerna shakes; discussed increasing to TID. Noted SLP signed off on 1/23. Pt tolerating po diet. Awaiting placement. Nutrition Related Findings: BM 1/22. No edema. Pertinent meds: vitamin D3, steroid, pepcid, lanuts, SSI, remeron. Total Energy Requirements (kcals/day): [id number] Energy Requirements Based On: [facility] (x1-1.3) Weight Used for Energy Requirements: Current (93.9 kg)  Total Protein Requirements (g/day): 117-131 Weight Used for Protein Requirements: Current (x1.25-1. 4)  Total Fluid Requirements (ml/day): 3704-1266 Method Used for Fluid Requirements: 1 ml/kcal    Current Nutrition Therapies:  ADULT ORAL NUTRITION SUPPLEMENT Breakfast, Dinner; Diabetic Supplement  ADULT ORAL NUTRITION SUPPLEMENT Lunch, Dinner; Wound Healing Supplement  ADULT DIET Regular; 4 carb choices (60 gm/meal); Low Fat/Low Chol/High Fiber/2 gm Na; Low Potassium (Less than 3000 mg/day); No Concentrated Sweets     Anthropometric Measures:  Height: 5' 11\" (180.3 cm) Weight: 93.9 kg (207 lb) Body mass index is 28.87 kg/m².   Admission Body Weight: 240 lb 1.3 oz (pt stated)  Ideal Body Weight (lbs) (Calculated): 172 lbs Ideal Body Weight (Kg) (Calculated): 78 kg % IBW (Calculated): 126.1 %  Usual Body Weight: 108.9 kg (240 lb) (1/18/2019 per chart hx) % Weight Change (Calculated): -9.6            Nutrition Diagnosis:   · Inadequate oral intake related to other (specify),early satiety (decreased appetite) as evidenced by intake 51-75%,intake 26-50%    · Increased nutrient needs related to inadequate protein-energy intake (promotion of wound healing) as evidenced by wounds (pressure injury)       Nutrition Interventions:   Food and/or Nutrient Delivery: Continue current diet,Vitamin supplement,Modify oral nutrition supplement  Nutrition Education/Counseling: No recommendations at this time,Education not indicated  Coordination of Nutrition Care: Continue to monitor while inpatient    Previous Goal Met: Progressing toward goal(s)  Goals: PO nutrition intake will meet >75% of patient's estimated nutrition needs within the next follow up date    Nutrition Monitoring and Evaluation: Patient will be monitored per nutrition standards of care. Consult Dietitian if nutrition intervention essential to patient care is needed. Behavioral-Environmental Outcomes: None identified  Food/Nutrient Intake Outcomes: Food and nutrient intake,Supplement intake,Vitamin/mineral intake  Physical Signs/Symptoms Outcomes: Biochemical data,Meal time behavior,Nutrition focused physical findings,Skin    Discharge Planning:  Too soon to determine    Juan Alberto Tamayo RD on 1/25/2022 at 5:48 PM  Contact: [phone]    Disaster Mode Active

## 2022-01-25 NOTE — PROGRESS NOTES
Problem: Self Care Deficits Care Plan (Adult)  Goal: *Acute Goals and Plan of Care (Insert Text)  Description: Occupational Therapy Goals  Re-evaluation 1/25/2022, pt is making slow but steady progress towards goals, pt requires re-direction for good participation d/t emotional lability displayed throughout tx session    1. Patient will perform grooming with supervision/set-up. 2.  Patient will perform bathing with minimal assistance/contact guard assist.  3.  Patient will perform upper body dressing and lower body dressing with minimal assistance/contact guard assist.  4.  Patient will perform bedside commode transfers with minimal assistance/contact guard assist using LRAD. 5.  Patient will perform all aspects of toileting with minimal assistance/contact guard assist.  6.  Patient will participate in upper extremity therapeutic exercise/activities with supervision/set-up for 8 minutes. 7.  Patient will utilize energy conservation techniques during functional activities with min verbal cues. Prior Level of Function: pt demonstrating difficulty providing accurate PLOF e.g. use of AD for functional mobility - pt states he used a walker and also states he does not use a walker. Pt indicated that his spouse assists with shower transfer and bathing tasks while seated on shower seat and spouse also assists with dressing tasks. Outcome: Progressing Towards Goal   OCCUPATIONAL THERAPY RE-EVALUATION    Patient: Wade Perry (6 y.o. male)  Date: 1/25/2022  Primary Diagnosis: COVID [U07.1]  Pneumonia due to COVID-19 virus [U07.1, J12.82]        Precautions:   Contact,Skin,Other (comment) (droplet+)  PLOF: pt demonstrating difficulty providing accurate PLOF e.g. use of AD for functional mobility - pt states he used a walker and also states he does not use a walker.  Pt indicated that his spouse assists with shower transfer and bathing tasks while seated on shower seat and spouse also assists with dressing tasks.     ASSESSMENT :  Nursing/RN cleared for pt to participate in OT re-evaluation and tx session. Patient was seen with PT to maximize patient safety, participation, and functional mobility in preparation for self-care tasks. Pt demonstrates emotional lability throughout tx session, attempt to re-direct re: good progress achieved with therapy and out of bed activity-nursing notified. Pt presents soiled in bed d/t UI. Bed mobility: supine to sit w/ Max A, scoot to edge of bed with CGA. UB bathe w/ Mod A for thoroughness, education for marianela-technique provided. UB dress: Max A doff and don hospital gowns, education provided for hemitechnique. STS with CGA, Mod A x 2 Stand step for edge of bed to recliner chair with hand held assist, max vc's for feet placement. Pt sitting up in recliner, chair alarm intact. Instruction for SROM for hemiplegic LUE with good return demonstration, instruction to perform as tolerated for increasing LUE motor return, strength and AROM for self care tasks and functional transfers. Pt sitting up in recliner at end of tx session, BLEs elevated, call bell within reach & pt verbalized understanding to utilize for assist e.g. functional transfers in order to prevent falls. Patient will benefit from skilled intervention to address the above impairments.   Patient's rehabilitation potential is considered to be Good  Factors which may influence rehabilitation potential include:   []             None noted  [x]             Mental ability/status  [x]             Medical condition  []             Home/family situation and support systems  []             Safety awareness  []             Pain tolerance/management  []             Other:      PLAN :  Recommendations and Planned Interventions:   [x]               Self Care Training                  [x]      Therapeutic Activities  [x]               Functional Mobility Training   []      Cognitive Retraining  [x]               Therapeutic Exercises [x]      Endurance Activities  [x]               Balance Training                    [x]      Neuromuscular Re-Education  []               Visual/Perceptual Training     [x]      Home Safety Training  [x]               Patient Education                   [x]      Family Training/Education  []               Other (comment):    Frequency/Duration: Patient will be followed by occupational therapy 3-5 times a week to address goals. Discharge Recommendations: Rehab  Further Equipment Recommendations for Discharge: bedside commode and wheelchair      SUBJECTIVE:   Patient stated My butt hurts.     OBJECTIVE DATA SUMMARY:   Hospital course since last seen and reason for reevaluation: pt is making slow but steady progress towards goals, pt requires re-direction for good participation d/t emotional lability displayed throughout tx session    Past Medical History:   Diagnosis Date    CVA (cerebral vascular accident) ([facility] [address]     right-sided deficit    Diabetes ([facility] [address]     HTN (hypertension)      Past Surgical History:   Procedure Laterality Date    HX HIP FRACTURE TX      left    HX ORTHOPAEDIC      hip     Barriers to Learning/Limitations: yes;  sensory deficits-vision/hearing/speech  Compensate with: visual, verbal, tactile, kinesthetic cues/model    Home Situation:   Home Situation  Home Environment: Private residence  # Steps to Enter: 4  Rails to Enter: Yes  Wheelchair Ramp: No  One/Two Story Residence: One story  Living Alone: No  Support Systems: Spouse/Significant Other  Patient Expects to be Discharged to[de-identified] Rehab Unit Subacute  Current DME Used/Available at Home: [employer] chair  Tub or Shower Type: Shower  []  Right hand dominant   []  Left hand dominant    Cognitive/Behavioral Status:  Neurologic State: Alert  Orientation Level: Oriented to place;Oriented to person  Cognition: Follows commands  Safety/Judgement: Fall prevention    Skin: appears intact  Edema: none noted    Vision/Perceptual:  appears intact       Coordination: BUE  Coordination: Within functional limits (RUE, LUE impaired )  Fine Motor Skills-Upper: Right Intact; Left Impaired    Gross Motor Skills-Upper: Right Intact; Left Impaired    Balance:  Sitting: Impaired; With support  Sitting - Static: Good (unsupported)  Sitting - Dynamic: Fair (occasional)  Standing: Impaired; With support  Standing - Static: Fair  Standing - Dynamic : Fair    Strength: BUE  Strength: Within functional limits (RUE, LUE impaired )     Tone & Sensation: BUE  Tone:  (RUE Normal, LUE abnormal-hypotonic)  Sensation: Intact       Range of Motion: BUE  AROM: Within functional limits (RUE, LUE impaired )  PROM: Within functional limits (LUE)     Functional Mobility and Transfers for ADLs:  Bed Mobility:     Supine to Sit: Maximum assistance     Scooting: Contact guard assistance  Transfers:  Sit to Stand: Contact guard assistance; Additional time  Stand to Sit: Contact guard assistance  Stand Pivot Transfers: Assist x2; Moderate assistance     ADL Assessment:   Oral Facial Hygiene/Grooming: Stand-by assistance;Setup    Bathing: Moderate assistance    Upper Body Dressing: Maximum assistance    ADL Intervention:  Grooming  Position Performed: Seated edge of bed  Washing Face: Stand-by assistance;Set-up    Upper Body Bathing  Bathing Assistance: Moderate assistance  Position Performed: Seated edge of bed    Upper Body [address]: Maximum assistance    Cognitive Retraining  Safety/Judgement: Fall prevention    Pain:  Pain level pre-treatment: pt reports pain in buttocks-nursing notified   Pain level post-treatment: pt reports pain in buttocks-nursing notified   Pain Intervention(s): Medication (see MAR); Rest,  Repositioning   Response to intervention: Nurse notified, See doc flow    Activity Tolerance:   poor  Please refer to the flowsheet for vital signs taken during this treatment.   After treatment:   [x] Patient left in no apparent distress sitting up in chair  [] Patient left in no apparent distress in bed  [x] Call bell left within reach  [x] Nursing notified  [] Caregiver present  [x] chair alarm activated    COMMUNICATION/EDUCATION:   [x] Role of Occupational Therapy in the acute care setting  [x] Home safety education was provided and the patient/caregiver indicated understanding. [x] Patient/family have participated as able in goal setting and plan of care. [x] Patient/family agree to work toward stated goals and plan of care. [] Patient understands intent and goals of therapy, but is neutral about his/her participation. [] Patient is unable to participate in goal setting and plan of care.     Thank you for this referral.  Cong Tucker  Time Calculation: 21 mins

## 2022-01-25 NOTE — PROGRESS NOTES
Problem: Mobility Impaired (Adult and Pediatric)  Goal: *Acute Goals and Plan of Care (Insert Text)  Description: Physical Therapy Goals  Initiated 1/18/2022, re-evaluated 1/25/22 and goals adjusted as needed and to be accomplished within 7 day(s)  1. Patient will move from supine to sit and sit to supine , scoot up and down, and roll side to side in bed with supervision. 2.  Patient will transfer from bed to chair and chair to bed with CGA using the least restrictive device. 3.  Patient will perform sit to stand with CGA  4. Patient will ambulate with Sheyla for 20 feet with the least restrictive device. 5.  Patient will ascend/descend 4 stairs with unilateral handrail(s) with minimal assistance/contact guard assist.    PLOF: Pt reporting he ambulates without AD, assist sometimes for bed mobility. Lives with wife in 1 story house with 4 YURIY. Outcome: Progressing Towards Goal    PHYSICAL THERAPY RE-EVALUATION    Patient: Michelle Richards (49 y.o. male)  Date: 1/25/2022  Primary Diagnosis: COVID [U07.1]  Pneumonia due to COVID-19 virus [U07.1, J12.82]       Precautions:  Fall,Contact,Other (comment) (droplet+)    ASSESSMENT :  Pt cleared to participate in PT session, pt received semi-reclined in bed and agreeable to therapy session. Completing with OT to maximize safety and mobility. Based on the objective data described below, the patient presents with decreased endurance, decreased strength, decreased balance reactions, gait deviations, and decreased independence in functional mobility. Pt continues to have limited RUE movement and slightly decreased RLE movement. Pt found to be soiled of urine in bed. MaxA for supine to sit, pt tearful throughout session. Pt sitting on EOB with good sitting balance. Pt standing with modA, handhold for walking SPT to recliner. Decreased R foot step length. Pt left in recliner with chair alarm donned.  Pt positioned for comfort and educated to call for assist before getting up, pt verbalized understanding. Pt left with all needs met and call bell in reach. RN notified of position and participation. Patient will benefit from skilled intervention to address the above impairments. Patient's rehabilitation potential is considered to be Fair  Factors which may influence rehabilitation potential include:   [x]         None noted  []         Mental ability/status  []         Medical condition  []         Home/family situation and support systems  []         Safety awareness  []         Pain tolerance/management  []         Other:      PLAN :  Recommendations and Planned Interventions:   [x]           Bed Mobility Training             [x]    Neuromuscular Re-Education  [x]           Transfer Training                   []    Orthotic/Prosthetic Training  [x]           Gait Training                          []    Modalities  [x]           Therapeutic Exercises           []    Edema Management/Control  [x]           Therapeutic Activities            [x]    Family Training/Education  [x]           Patient Education  []           Other (comment):    Frequency/Duration: Patient will be followed by physical therapy 1-2 times per day/2-5 days per week to address goals. Discharge Recommendations: Rehab  Further Equipment Recommendations for Discharge: RW with platform     SUBJECTIVE:   Patient stated My butt hurts.     OBJECTIVE DATA SUMMARY:   Hospital course since last seen and reason for re-evaluation: Pt due for re-evaluation. Pt improving in bed mobility and able to transition OOB with Sheyla and L handhold. Pt not safe for ambulation, demonstrating limited R step length and deweighting. Pt would continue to benefit from skilled PT to continue to progress towards goals.      Past Medical History:   Diagnosis Date    CVA (cerebral vascular accident) ([facility] [address]     right-sided deficit    Diabetes ([facility] [address]     HTN (hypertension)      Past Surgical History:   Procedure Laterality Date    HX HIP FRACTURE TX      left    HX ORTHOPAEDIC      hip     Barriers to Learning/Limitations: yes;  physical and altered mental status (i.e.Sedation, Confusion)  Compensate with: Visual Cues, Verbal Cues, and Tactile Cues  Home Situation:   Home Situation  Home Environment: Private residence  # Steps to Enter: 4  Rails to Enter: Yes  Wheelchair Ramp: No  One/Two Story Residence: One story  Living Alone: No  Support Systems: Spouse/Significant Other  Patient Expects to be Discharged to[de-identified] Rehab Unit Subacute  Current DME Used/Available at Home: [employer] chair  Tub or Shower Type: Shower  Critical Behavior:  Neurologic State: Alert  Orientation Level: Oriented to person;Oriented to place  Cognition: Follows commands  Safety/Judgement: Awareness of environment; Fall prevention  Psychosocial  Patient Behaviors: Cooperative  Needs Expressed: Emotional;Educational  Purposeful Interaction: Yes  Pt Identified Daily Priority: Clinical issues (comment)  [employer] Process: Establish trust;Create healing environment; Attend basic human needs  Caring Interventions: Reassure; Therapeutic modalities  Reassure: Therapeutic listening; Informing;Caring rounds  Therapeutic Modalities: Intentional therapeutic touch  Other Caring Modalities: hourly rounds    Strength:    Strength: Generally decreased, functional    Tone & Sensation:   Tone: Normal    Sensation: Intact    Range Of Motion:  AROM: Generally decreased, functional (RLE, LLE WNL )    Functional Mobility:  Bed Mobility:     Supine to Sit: Maximum assistance     Scooting: Contact guard assistance  Transfers:  Sit to Stand: Contact guard assistance; Additional time  Stand to Sit: Contact guard assistance  Stand Pivot Transfers: Assist x2; Moderate assistance       Balance:   Sitting: Impaired; With support  Sitting - Static: Good (unsupported)  Sitting - Dynamic: Fair (occasional)  Standing: Impaired; With support  Standing - Static: Fair  Standing - Dynamic : Fair    Pain:  Pain level pre-treatment: 0/10   Pain level post-treatment: 0/10       Activity Tolerance:   Improving tolerance OOB     Please refer to the flowsheet for vital signs taken during this treatment. After treatment:   []         Patient left in no apparent distress sitting up in chair  [x]         Patient left in no apparent distress in bed  [x]         Call bell left within reach  [x]         Nursing notified  []         Caregiver present  [x]         chair alarm activated  []         SCDs applied    COMMUNICATION/EDUCATION:   [x]         Role of Physical Therapy in the acute care setting. [x]         Fall prevention education was provided and the patient/caregiver indicated understanding. [x]         Patient/family have participated as able in goal setting and plan of care. [x]         Patient/family agree to work toward stated goals and plan of care. []         Patient understands intent and goals of therapy, but is neutral about his/her participation. []         Patient is unable to participate in goal setting/plan of care: ongoing with therapy staff.  []         Other:     Thank you for this referral.  Kalyn Murphy, PT   Time Calculation: 24 mins

## 2022-01-25 NOTE — PROGRESS NOTES
ARU/IPR REFERRAL CONTACT NOTE  [id number] [facility]    RE:  Clare Batista     Thank you for the opportunity to review this patient's case for admission to [address] [facility]. Based on our pre-admission screening:     [ Alejandra Vargas Team/Medical Director is following this case. Comments:  Referral received. Pt currently on 8L hi flow O2 with poor activity tolerance with therapies. Will follow along for potential future IPR needs. Please be advised admission to ARU will be based on meeting admission requirements. Thank you for this referral.   Please do not hesitate to call if you need further information or have additional questions. Regards,    MISBAH Vail PTA for Taylor Machuca, AIDE  Admissions [facility]  [phone]

## 2022-01-25 NOTE — ROUTINE PROCESS
Bedside shift change report given to [address] [place] [address] (oncoming nurse) by John Casper RN (offgoing nurse). Report included the following information SBAR, Kardex, MAR and Cardiac Rhythm NSR.

## 2022-01-25 NOTE — PROGRESS NOTES
Hospitalist Progress Note    Patient: Ambika Banuelos Age: 46 y.o. : 1970 MR#: [id number] SSN: [id number]  Date/Time: 2022     DOA: 2022  PCP: Norma Good MD    Subjective:     Patient is sitting in bed in no apparent distress, has dyspnea on exertion. On 7 L high flow nasal cannula      Assessment/Plan:     1. Acute respiratory failure with hypoxia due to COVID-19 infection         2. COVID-19 pneumonia       -treated with decadrone [id number]   3. MONICA on CKD3, associate with covid infection, resolving  4. Elevated troponin, demand ischemia,   5. Hyperglycemia with T2DM, HgbA1c 7.8%  6. Acute DVT on legs, associate hypercoagulable state due to covid         -was on heparin gtt, now tolerated [address]   7. H/o stroke with right-sided hemiparesis   8. . Stage 2 pressure ulcer of the gluteal cleft, POA   9.    Hypomagnesemia, replaced     Wean oxygen, bronchial hygiene  Encouraged incentive spirometry  PT OT  Lantus, sliding scale insulin, add prandial insulin  On Xarelto  Cont combivent   Melatonin  pepcid  Avoid nephrotoxic meds, nephrology is following  PT/OT, are recommending rehab    Full code   Disposition-skilled nursing facility/rehab  I tried to call patient's wife at phone #[phone] and left a message  Additional Notes:        Case discussed with:  [x]Patient  [x]Family  [x]Nursing  [x]Case Management  DVT Prophylaxis:  []Lovenox  [x]Xarelto  []SCDs  []Coumadin   []On Heparin gtt    Signed By: Olga Turner MD     2022               Objective:   VS:   Visit Vitals  /74   Pulse 77   Temp 97.9 °F (36.6 °C)   Resp 20   Ht 5' 11\" (1.803 m)   Wt 93.9 kg (207 lb)   SpO2 94%   BMI 28.87 kg/m²      Tmax/24hrs: Temp (24hrs), Av.1 °F (36.7 °C), Min:97.6 °F (36.4 °C), Max:98.8 °F (37.1 °C)      Intake/Output Summary (Last 24 hours) at 2022 1824  Last data filed at 2022 1225  Gross per 24 hour   Intake 521 ml   Output --   Net 521 ml       General:  Awake, alert  Cardiovascular:  S1S2+, RRR  Pulmonary: Coarse breath sounds bilaterally  GI:  Soft, BS+, NT, ND  Extremities:  No edema      Current Facility-Administered Medications   Medication Dose Route Frequency    ipratropium-albuterol (COMBIVENT RESPIMAT) 20 mcg-100 mcg inhalation spray  1 Puff Inhalation QID RT    [START ON 1/26/2022] insulin lispro (HUMALOG) injection 3 Units  3 Units SubCUTAneous TIDAC    mirtazapine (REMERON) tablet 7.5 mg  7.5 mg Oral QHS    insulin glargine (LANTUS) injection 45 Units  45 Units SubCUTAneous DAILY    metoprolol tartrate (LOPRESSOR) tablet 12.5 mg  12.5 mg Oral Q12H    dexAMETHasone (DECADRON) tablet 6 mg  6 mg Oral DAILY    dextrose 10% infusion 125-250 mL  125-250 mL IntraVENous PRN    hydrALAZINE (APRESOLINE) 20 mg/mL injection 10 mg  10 mg IntraVENous Q6H PRN    [Held by provider] amLODIPine (NORVASC) tablet 10 mg  10 mg Oral DAILY    rivaroxaban (XARELTO) tablet 15 mg  15 mg Oral BID WITH MEALS    sodium chloride (NS) flush 5-40 mL  5-40 mL IntraVENous Q8H    sodium chloride (NS) flush 5-40 mL  5-40 mL IntraVENous PRN    acetaminophen (TYLENOL) tablet 650 mg  650 mg Oral Q6H PRN    Or    acetaminophen (TYLENOL) suppository 650 mg  650 mg Rectal Q6H PRN    polyethylene glycol (MIRALAX) packet 17 g  17 g Oral DAILY PRN    ondansetron (ZOFRAN ODT) tablet 4 mg  4 mg Oral Q8H PRN    Or    ondansetron (ZOFRAN) injection 4 mg  4 mg IntraVENous Q6H PRN    insulin lispro (HUMALOG) injection   SubCUTAneous AC&HS    glucose chewable tablet 16 g  4 Tablet Oral PRN    glucagon (GLUCAGEN) injection 1 mg  1 mg IntraMUSCular PRN    famotidine (PEPCID) tablet 20 mg  20 mg Oral DAILY    melatonin tablet 5 mg  5 mg Oral QHS    cholecalciferol (VITAMIN D3) capsule 5,000 Units  5,000 Units Oral DAILY            Lab/Data Review:  Labs: Results:       Chemistry Recent Labs     01/25/22  0151 01/24/22  1032 01/23/22  0347   * 105* 196*   * 138 132*   K 5.3 4.6 5.5    103 103   CO2 23 30 22   BUN 49* 52* 49*   CREA 1.64* 1.88* 1.70*   BUCR 30* 28* 29*   AGAP 7 5 7   CA 9.1 9.6 9.1   PHOS 4.3 3.6  --      Recent Labs     01/25/22  0151 01/24/22  1032   ALB 2.3* 2.6*      CBC w/Diff No results for input(s): WBC, RBC, HGB, HCT, MCV, MCH, MCHC, RDW, PLT, BANDS, GRANS, LYMPH, EOS, HGBEXT, HCTEXT, PLTEXT, HGBEXT, HCTEXT, PLTEXT in the last 72 hours. Coagulation No results for input(s): PTP, INR, APTT, INREXT, INREXT in the last 72 hours. Iron/Ferritin Lab Results   Component Value Date/Time    Ferritin 1,426 (H) 01/09/2022 08:34 PM       BNP    Cardiac Enzymes Lab Results   Component Value Date/Time     (H) 01/11/2022 03:39 PM        Lactic Acid    Thyroid Studies          All Micro Results     Procedure Component Value Units Date/Time    COVID-19 RAPID TEST [[id number]]  (Abnormal) Collected: 01/09/22 2042    Order Status: Completed Specimen: Nasopharyngeal Updated: 01/09/22 2259     Specimen source Nasopharyngeal        COVID-19 rapid test Detected        Comment:      The specimen is POSITIVE for SARS-CoV-2, the novel coronavirus associated with COVID-19. This test has been authorized by the FDA under an Emergency Use Authorization (EUA) for use by authorized laboratories. Fact sheet for Healthcare Providers: ConventionUpdate.co.nz  Fact sheet for Patients: ConventionUpdate.co.nz       Methodology: Isothermal Nucleic Acid Amplification  CALLED TO AND CORRECTLY REPEATED BY:  SEDA TRINIDAD RN IN ED AT 2798 ON 01/9/22 TO [facility]                 Images:    CT (Most Recent). CT Results (most recent):  Results from Hospital Encounter encounter on 01/18/19    CT ABD PELV WO CONT    Narrative  EXAMINATION: CT abdomen/pelvis without contrast    INDICATION: Abdominal pain, stomach pain, diarrhea    COMPARISON: None    TECHNIQUE: CT of the abdomen and pelvis performed without contrast, with  multiplanar reformations.  All CT scans at this facility are performed using dose  optimization technique as appropriate to a performed exam, to include automated  exposure control, adjustment of the mA and/or kV according to patient size  (including appropriate matching first site specific examinations), or use of  iterative reconstruction technique. FINDINGS:    Evaluation limited by streak artifact from arms at side. Evaluation of soft  tissues and vessels suboptimal without vascular contrast.    Lower thorax: A few lung base streaky densities, likely atelectasis or scarring. Additional a few subtle hazy groundglass densities at the right lung base. Hepatobiliary: Liver unremarkable. Gallbladder unremarkable. No biliary duct  dilatation. Pancreas: Unremarkable. Spleen: Unremarkable. Adrenal glands: Right greater than left diffuse appearing adrenal gland  thickening, without well-defined nodule. Genitourinary: Right kidney unremarkable without hydronephrosis and without  nephrolithiasis identified. Left kidney unremarkable without hydronephrosis and  without nephrolithiasis identified. Bladder is collapsed with possible mild wall  thickening. Prostate unremarkable. Gastrointestinal: Stomach unremarkable. Small bowel loops are nondilated. The  colon is nondilated. Sigmoid diverticulosis with possible mild sigmoid wall  thickening, may be exaggerated by nondistention. Appendix appears normal.    Mesentery/vessels/nodes: No free air or free fluid. Scattered atherosclerotic  calcifications, otherwise major vessels unremarkable. No adenopathy by size  criteria. Miscellaneous: Small fat-containing left greater than right inguinal hernias. Superficial soft tissues otherwise unremarkable. Bones: Bilateral L5 pars defects with grade 1 anterolisthesis. Lower lumbar  facet arthropathy. Multilevel prominent lumbar spine ventral endplate  osteophytes.  Left acetabular plate and screw fixation with markedly severe left  hip degenerative changes characterized by complete joint space loss, large  osteophytes, and prominent subchondral cyst formation. Metallic possibly  ballistic fragment along the right inferior pubic ramus. Impression  IMPRESSION:    No clearly acute findings. Sigmoid diverticulosis with perhaps mild sigmoid wall thickening, may be  exaggerated by nondistention, possibly sequela of chronic diverticulosis. Appendix normal.    Equivocal bladder wall thickening, likely exaggerated by nondistention. Correlate clinically. Very few subtle hazy groundglass densities in the right lung base, possibly  subtle inflammatory infiltrates. Recommend follow-up CT thorax in 3 months to  reassess. Bilateral diffuse appearing adrenal gland thickening without well-defined  nodules. Small fat-containing inguinal hernias. Bony findings including left acetabular  prior hardware fixation and severe left hip degenerative changes, as well as  bilateral L5 pars defects with associated grade 1 anterolisthesis. See details above. XRAY (Most Recent)      EKG No results found for this or any previous visit.      2D ECHO

## 2022-01-26 LAB
ALBUMIN SERPL-MCNC: 2.3 G/DL (ref 3.4–5)
ANION GAP SERPL CALC-SCNC: 7 MMOL/L (ref 3–18)
BASOPHILS # BLD: 0 K/UL (ref 0–0.1)
BASOPHILS NFR BLD: 0 % (ref 0–2)
BUN SERPL-MCNC: 44 MG/DL (ref 7–18)
BUN/CREAT SERPL: 33 (ref 12–20)
CALCIUM SERPL-MCNC: 9.1 MG/DL (ref 8.5–10.1)
CHLORIDE SERPL-SCNC: 103 MMOL/L (ref 100–111)
CO2 SERPL-SCNC: 23 MMOL/L (ref 21–32)
CREAT SERPL-MCNC: 1.35 MG/DL (ref 0.6–1.3)
DIFFERENTIAL METHOD BLD: ABNORMAL
EOSINOPHIL # BLD: 0 K/UL (ref 0–0.4)
EOSINOPHIL NFR BLD: 0 % (ref 0–5)
ERYTHROCYTE [DISTWIDTH] IN BLOOD BY AUTOMATED COUNT: 13.1 % (ref 11.6–14.5)
GLUCOSE BLD STRIP.AUTO-MCNC: 111 MG/DL (ref 70–110)
GLUCOSE BLD STRIP.AUTO-MCNC: 207 MG/DL (ref 70–110)
GLUCOSE BLD STRIP.AUTO-MCNC: 238 MG/DL (ref 70–110)
GLUCOSE BLD STRIP.AUTO-MCNC: 261 MG/DL (ref 70–110)
GLUCOSE SERPL-MCNC: 187 MG/DL (ref 74–99)
HCT VFR BLD AUTO: 39.5 % (ref 36–48)
HGB BLD-MCNC: 13 G/DL (ref 13–16)
IMM GRANULOCYTES # BLD AUTO: 0.1 K/UL (ref 0–0.04)
IMM GRANULOCYTES NFR BLD AUTO: 1 % (ref 0–0.5)
LYMPHOCYTES # BLD: 0.7 K/UL (ref 0.9–3.6)
LYMPHOCYTES NFR BLD: 6 % (ref 21–52)
MAGNESIUM SERPL-MCNC: 2.2 MG/DL (ref 1.6–2.6)
MCH RBC QN AUTO: 28.4 PG (ref 24–34)
MCHC RBC AUTO-ENTMCNC: 32.9 G/DL (ref 31–37)
MCV RBC AUTO: 86.4 FL (ref 78–100)
MONOCYTES # BLD: 0.9 K/UL (ref 0.05–1.2)
MONOCYTES NFR BLD: 7 % (ref 3–10)
NEUTS SEG # BLD: 10.1 K/UL (ref 1.8–8)
NEUTS SEG NFR BLD: 86 % (ref 40–73)
NRBC # BLD: 0 K/UL (ref 0–0.01)
NRBC BLD-RTO: 0 PER 100 WBC
PHOSPHATE SERPL-MCNC: 3.8 MG/DL (ref 2.5–4.9)
PLATELET # BLD AUTO: 168 K/UL (ref 135–420)
PMV BLD AUTO: 12.7 FL (ref 9.2–11.8)
POTASSIUM SERPL-SCNC: 5.2 MMOL/L (ref 3.5–5.5)
RBC # BLD AUTO: 4.57 M/UL (ref 4.35–5.65)
SODIUM SERPL-SCNC: 133 MMOL/L (ref 136–145)
WBC # BLD AUTO: 11.7 K/UL (ref 4.6–13.2)

## 2022-01-26 PROCEDURE — 82962 GLUCOSE BLOOD TEST: CPT

## 2022-01-26 PROCEDURE — 94640 AIRWAY INHALATION TREATMENT: CPT

## 2022-01-26 PROCEDURE — 74011636637 HC RX REV CODE- 636/637: Performed by: EMERGENCY MEDICINE

## 2022-01-26 PROCEDURE — 83735 ASSAY OF MAGNESIUM: CPT

## 2022-01-26 PROCEDURE — 74011000250 HC RX REV CODE- 250: Performed by: HOSPITALIST

## 2022-01-26 PROCEDURE — 74011250637 HC RX REV CODE- 250/637: Performed by: INTERNAL MEDICINE

## 2022-01-26 PROCEDURE — 74011250637 HC RX REV CODE- 250/637: Performed by: FAMILY MEDICINE

## 2022-01-26 PROCEDURE — 80069 RENAL FUNCTION PANEL: CPT

## 2022-01-26 PROCEDURE — 74011250636 HC RX REV CODE- 250/636: Performed by: INTERNAL MEDICINE

## 2022-01-26 PROCEDURE — 74011250637 HC RX REV CODE- 250/637: Performed by: EMERGENCY MEDICINE

## 2022-01-26 PROCEDURE — 85025 COMPLETE CBC W/AUTO DIFF WBC: CPT

## 2022-01-26 PROCEDURE — 74011636637 HC RX REV CODE- 636/637: Performed by: INTERNAL MEDICINE

## 2022-01-26 PROCEDURE — 2709999900 HC NON-CHARGEABLE SUPPLY

## 2022-01-26 PROCEDURE — 65660000000 HC RM CCU STEPDOWN

## 2022-01-26 PROCEDURE — 99232 SBSQ HOSP IP/OBS MODERATE 35: CPT | Performed by: EMERGENCY MEDICINE

## 2022-01-26 PROCEDURE — 36415 COLL VENOUS BLD VENIPUNCTURE: CPT

## 2022-01-26 RX ADMIN — FAMOTIDINE 20 MG: 20 TABLET ORAL at 08:43

## 2022-01-26 RX ADMIN — Medication 6 UNITS: at 16:53

## 2022-01-26 RX ADMIN — IPRATROPIUM BROMIDE AND ALBUTEROL 1 PUFF: 20; 100 SPRAY, METERED RESPIRATORY (INHALATION) at 19:01

## 2022-01-26 RX ADMIN — Medication 5 MG: at 21:27

## 2022-01-26 RX ADMIN — Medication 5000 UNITS: at 08:43

## 2022-01-26 RX ADMIN — RIVAROXABAN 15 MG: 15 TABLET, FILM COATED ORAL at 08:43

## 2022-01-26 RX ADMIN — METOPROLOL TARTRATE 12.5 MG: 25 TABLET, FILM COATED ORAL at 21:27

## 2022-01-26 RX ADMIN — SODIUM CHLORIDE, PRESERVATIVE FREE 10 ML: 5 INJECTION INTRAVENOUS at 21:29

## 2022-01-26 RX ADMIN — IPRATROPIUM BROMIDE AND ALBUTEROL 1 PUFF: 20; 100 SPRAY, METERED RESPIRATORY (INHALATION) at 16:00

## 2022-01-26 RX ADMIN — Medication 6 UNITS: at 21:28

## 2022-01-26 RX ADMIN — DEXAMETHASONE 6 MG: 2 TABLET ORAL at 08:43

## 2022-01-26 RX ADMIN — Medication 45 UNITS: at 08:42

## 2022-01-26 RX ADMIN — SODIUM CHLORIDE, PRESERVATIVE FREE 10 ML: 5 INJECTION INTRAVENOUS at 15:30

## 2022-01-26 RX ADMIN — MIRTAZAPINE 7.5 MG: 15 TABLET, FILM COATED ORAL at 21:26

## 2022-01-26 RX ADMIN — SODIUM CHLORIDE, PRESERVATIVE FREE 10 ML: 5 INJECTION INTRAVENOUS at 06:35

## 2022-01-26 RX ADMIN — INSULIN LISPRO 3 UNITS: 100 INJECTION, SOLUTION INTRAVENOUS; SUBCUTANEOUS at 16:53

## 2022-01-26 RX ADMIN — INSULIN LISPRO 3 UNITS: 100 INJECTION, SOLUTION INTRAVENOUS; SUBCUTANEOUS at 08:37

## 2022-01-26 RX ADMIN — RIVAROXABAN 15 MG: 15 TABLET, FILM COATED ORAL at 16:58

## 2022-01-26 NOTE — ROUTINE PROCESS
Bedside shift change report given to Papa Dong LPN (oncoming nurse) by Patsy Johnson RN (offgoing nurse). Report included the following information SBAR, Kardex, MAR and Cardiac Rhythm NSR.

## 2022-01-26 NOTE — ROUTINE PROCESS
Bedside and Verbal shift change report given to [address] (oncoming nurse) by Papa Dong LPN (offgoing nurse). Report included the following information SBAR, Kardex, Intake/Output, MAR and Recent Results.

## 2022-01-26 NOTE — PROGRESS NOTES
New OT order received and chart reviewed. Patient evaluated and currently on skilled OT caseload. Will acknowledge the order.   Thank you for the referral.  Giselle Fernandez MS OTR/L

## 2022-01-27 LAB
ALBUMIN SERPL-MCNC: 2.3 G/DL (ref 3.4–5)
ANION GAP SERPL CALC-SCNC: 5 MMOL/L (ref 3–18)
BUN SERPL-MCNC: 38 MG/DL (ref 7–18)
BUN/CREAT SERPL: 26 (ref 12–20)
CALCIUM SERPL-MCNC: 9.3 MG/DL (ref 8.5–10.1)
CHLORIDE SERPL-SCNC: 102 MMOL/L (ref 100–111)
CO2 SERPL-SCNC: 28 MMOL/L (ref 21–32)
CREAT SERPL-MCNC: 1.44 MG/DL (ref 0.6–1.3)
GLUCOSE BLD STRIP.AUTO-MCNC: 126 MG/DL (ref 70–110)
GLUCOSE BLD STRIP.AUTO-MCNC: 200 MG/DL (ref 70–110)
GLUCOSE BLD STRIP.AUTO-MCNC: 84 MG/DL (ref 70–110)
GLUCOSE BLD STRIP.AUTO-MCNC: 89 MG/DL (ref 70–110)
GLUCOSE SERPL-MCNC: 112 MG/DL (ref 74–99)
PHOSPHATE SERPL-MCNC: 4 MG/DL (ref 2.5–4.9)
POTASSIUM SERPL-SCNC: 4.9 MMOL/L (ref 3.5–5.5)
SODIUM SERPL-SCNC: 135 MMOL/L (ref 136–145)

## 2022-01-27 PROCEDURE — 97535 SELF CARE MNGMENT TRAINING: CPT

## 2022-01-27 PROCEDURE — 80069 RENAL FUNCTION PANEL: CPT

## 2022-01-27 PROCEDURE — 74011250637 HC RX REV CODE- 250/637: Performed by: FAMILY MEDICINE

## 2022-01-27 PROCEDURE — 74011250637 HC RX REV CODE- 250/637: Performed by: EMERGENCY MEDICINE

## 2022-01-27 PROCEDURE — 94640 AIRWAY INHALATION TREATMENT: CPT

## 2022-01-27 PROCEDURE — 74011250637 HC RX REV CODE- 250/637: Performed by: INTERNAL MEDICINE

## 2022-01-27 PROCEDURE — 82962 GLUCOSE BLOOD TEST: CPT

## 2022-01-27 PROCEDURE — 2709999900 HC NON-CHARGEABLE SUPPLY

## 2022-01-27 PROCEDURE — 74011636637 HC RX REV CODE- 636/637: Performed by: INTERNAL MEDICINE

## 2022-01-27 PROCEDURE — 97530 THERAPEUTIC ACTIVITIES: CPT

## 2022-01-27 PROCEDURE — 65660000000 HC RM CCU STEPDOWN

## 2022-01-27 PROCEDURE — 99232 SBSQ HOSP IP/OBS MODERATE 35: CPT | Performed by: EMERGENCY MEDICINE

## 2022-01-27 PROCEDURE — 74011000250 HC RX REV CODE- 250: Performed by: HOSPITALIST

## 2022-01-27 PROCEDURE — 74011636637 HC RX REV CODE- 636/637: Performed by: EMERGENCY MEDICINE

## 2022-01-27 PROCEDURE — 36415 COLL VENOUS BLD VENIPUNCTURE: CPT

## 2022-01-27 PROCEDURE — 77010033711 HC HIGH FLOW OXYGEN

## 2022-01-27 RX ADMIN — SODIUM CHLORIDE, PRESERVATIVE FREE 10 ML: 5 INJECTION INTRAVENOUS at 06:16

## 2022-01-27 RX ADMIN — METOPROLOL TARTRATE 12.5 MG: 25 TABLET, FILM COATED ORAL at 08:55

## 2022-01-27 RX ADMIN — INSULIN LISPRO 3 UNITS: 100 INJECTION, SOLUTION INTRAVENOUS; SUBCUTANEOUS at 12:22

## 2022-01-27 RX ADMIN — RIVAROXABAN 15 MG: 15 TABLET, FILM COATED ORAL at 18:36

## 2022-01-27 RX ADMIN — SODIUM CHLORIDE, PRESERVATIVE FREE 10 ML: 5 INJECTION INTRAVENOUS at 21:58

## 2022-01-27 RX ADMIN — IPRATROPIUM BROMIDE AND ALBUTEROL 1 PUFF: 20; 100 SPRAY, METERED RESPIRATORY (INHALATION) at 12:00

## 2022-01-27 RX ADMIN — RIVAROXABAN 15 MG: 15 TABLET, FILM COATED ORAL at 08:55

## 2022-01-27 RX ADMIN — FAMOTIDINE 20 MG: 20 TABLET ORAL at 08:55

## 2022-01-27 RX ADMIN — MIRTAZAPINE 7.5 MG: 15 TABLET, FILM COATED ORAL at 21:57

## 2022-01-27 RX ADMIN — IPRATROPIUM BROMIDE AND ALBUTEROL 1 PUFF: 20; 100 SPRAY, METERED RESPIRATORY (INHALATION) at 21:35

## 2022-01-27 RX ADMIN — Medication 45 UNITS: at 08:54

## 2022-01-27 RX ADMIN — Medication 6 UNITS: at 12:23

## 2022-01-27 RX ADMIN — Medication 5000 UNITS: at 08:55

## 2022-01-27 RX ADMIN — Medication 5 MG: at 21:57

## 2022-01-27 RX ADMIN — IPRATROPIUM BROMIDE AND ALBUTEROL 1 PUFF: 20; 100 SPRAY, METERED RESPIRATORY (INHALATION) at 16:00

## 2022-01-27 RX ADMIN — METOPROLOL TARTRATE 12.5 MG: 25 TABLET, FILM COATED ORAL at 21:56

## 2022-01-27 NOTE — PROGRESS NOTES
Hospitalist Progress Note    Patient: Dustin Rosas Age: 46 y.o. : 1970 MR#: [id number] SSN: [id number]  Date/Time: 2022     DOA: 2022  PCP: Areli Tamayo MD    Subjective:     Patient is sitting in bed in no apparent distress, awake, follows simple commands, still on 7 L oxygen via nasal cannula. Patient knows where he is. Patient knows why he is in the hospital.  Patient is emotionally labile. Patient denies any suicidal ideation or thoughts at this time. Assessment/Plan:     1. Acute respiratory failure with hypoxia due to COVID-19 infection         2. COVID-19 pneumonia       -treated with decadrone [id number]   3. MONICA on CKD3, associate with covid infection, resolving  4. Elevated troponin, demand ischemia,   5. Hyperglycemia with T2DM, HgbA1c 7.8%  6. Acute DVT on legs, associate hypercoagulable state due to covid         -was on heparin gtt, now tolerated [address] [place] [address] Road   7. H/o stroke with right-sided hemiparesis   8. . Stage 2 pressure ulcer of the gluteal cleft, POA   9.    Hypomagnesemia, replaced     Bronchial hygiene, wean oxygen  Incentive spirometry  Lantus insulin, prandial insulin, sliding scale  On Xarelto  Combivent  Melatonin  pepcid  Avoid nephrotoxic meds, nephrology is following  PT and OT are recommending rehab  Full code   Disposition-rehab  I called patient's wife at phone #[phone] and updated her      Additional Notes:        Case discussed with:  [x]Patient  []Family  [x]Nursing  [x]Case Management  DVT Prophylaxis:  []Lovenox  [x]Xarelto  []SCDs  []Coumadin   []On Heparin gtt    Signed By: Sondra Vang MD     2022               Objective:   VS:   Visit Vitals  /77   Pulse 65   Temp 97.4 °F (36.3 °C)   Resp 20   Ht 5' 11\" (1.803 m)   Wt 93.9 kg (207 lb)   SpO2 94%   BMI 28.87 kg/m²      Tmax/24hrs: Temp (24hrs), Av.5 °F (36.4 °C), Min:97.3 °F (36.3 °C), Max:98.1 °F (36.7 °C)    No intake or output data in the 24 hours ending 22 1821    General:  Awake, alert  Cardiovascular:  S1S2+, RRR  Pulmonary: Coarse breath sounds bilaterally  GI:  Soft, BS+, NT, ND  Extremities:  No edema        Current Facility-Administered Medications   Medication Dose Route Frequency    ipratropium-albuterol (COMBIVENT RESPIMAT) 20 mcg-100 mcg inhalation spray  1 Puff Inhalation QID RT    insulin lispro (HUMALOG) injection 3 Units  3 Units SubCUTAneous TIDAC    mirtazapine (REMERON) tablet 7.5 mg  7.5 mg Oral QHS    insulin glargine (LANTUS) injection 45 Units  45 Units SubCUTAneous DAILY    metoprolol tartrate (LOPRESSOR) tablet 12.5 mg  12.5 mg Oral Q12H    dextrose 10% infusion 125-250 mL  125-250 mL IntraVENous PRN    hydrALAZINE (APRESOLINE) 20 mg/mL injection 10 mg  10 mg IntraVENous Q6H PRN    [Held by provider] amLODIPine (NORVASC) tablet 10 mg  10 mg Oral DAILY    rivaroxaban (XARELTO) tablet 15 mg  15 mg Oral BID WITH MEALS    sodium chloride (NS) flush 5-40 mL  5-40 mL IntraVENous Q8H    sodium chloride (NS) flush 5-40 mL  5-40 mL IntraVENous PRN    acetaminophen (TYLENOL) tablet 650 mg  650 mg Oral Q6H PRN    Or    acetaminophen (TYLENOL) suppository 650 mg  650 mg Rectal Q6H PRN    polyethylene glycol (MIRALAX) packet 17 g  17 g Oral DAILY PRN    ondansetron (ZOFRAN ODT) tablet 4 mg  4 mg Oral Q8H PRN    Or    ondansetron (ZOFRAN) injection 4 mg  4 mg IntraVENous Q6H PRN    insulin lispro (HUMALOG) injection   SubCUTAneous AC&HS    glucose chewable tablet 16 g  4 Tablet Oral PRN    glucagon (GLUCAGEN) injection 1 mg  1 mg IntraMUSCular PRN    famotidine (PEPCID) tablet 20 mg  20 mg Oral DAILY    melatonin tablet 5 mg  5 mg Oral QHS    cholecalciferol (VITAMIN D3) capsule 5,000 Units  5,000 Units Oral DAILY            Lab/Data Review:  Labs: Results:       Chemistry Recent Labs     01/27/22  0158 01/26/22  0053 01/25/22  0151   * 187* 269*   * 133* 133*   K 4.9 5.2 5.3    103 103   CO2 28 23 23   BUN 38* 44* 49* CREA 1.44* 1.35* 1.64*   BUCR 26* 33* 30*   AGAP 5 7 7   CA 9.3 9.1 9.1   PHOS 4.0 3.8 4.3     Recent Labs     01/27/22  0158 01/26/22  0053 01/25/22  0151   ALB 2.3* 2.3* 2.3*      CBC w/Diff Recent Labs     01/26/22  0053   WBC 11.7   RBC 4.57   HGB 13.0   HCT 39.5   MCV 86.4   MCH 28.4   MCHC 32.9   RDW 13.1      GRANS 86*   LYMPH 6*   EOS 0      Coagulation No results for input(s): PTP, INR, APTT, INREXT, INREXT in the last 72 hours. Iron/Ferritin Lab Results   Component Value Date/Time    Ferritin 1,426 (H) 01/09/2022 08:34 PM       BNP    Cardiac Enzymes Lab Results   Component Value Date/Time     (H) 01/11/2022 03:39 PM        Lactic Acid    Thyroid Studies          All Micro Results     Procedure Component Value Units Date/Time    COVID-19 RAPID TEST [[id number]]  (Abnormal) Collected: 01/09/22 2042    Order Status: Completed Specimen: Nasopharyngeal Updated: 01/09/22 2259     Specimen source Nasopharyngeal        COVID-19 rapid test Detected        Comment:      The specimen is POSITIVE for SARS-CoV-2, the novel coronavirus associated with COVID-19. This test has been authorized by the FDA under an Emergency Use Authorization (EUA) for use by authorized laboratories. Fact sheet for Healthcare Providers: ConventionUpdate.co.nz  Fact sheet for Patients: ConventionUpdate.co.nz       Methodology: Isothermal Nucleic Acid Amplification  CALLED TO AND CORRECTLY REPEATED BY:  SEDA TRINIDAD RN IN ED AT 9097 ON 01/9/22 TO [facility]                 Images:    CT (Most Recent). CT Results (most recent):  Results from Hospital Encounter encounter on 01/18/19    CT ABD PELV WO CONT    Narrative  EXAMINATION: CT abdomen/pelvis without contrast    INDICATION: Abdominal pain, stomach pain, diarrhea    COMPARISON: None    TECHNIQUE: CT of the abdomen and pelvis performed without contrast, with  multiplanar reformations.  All CT scans at this facility are performed using dose  optimization technique as appropriate to a performed exam, to include automated  exposure control, adjustment of the mA and/or kV according to patient size  (including appropriate matching first site specific examinations), or use of  iterative reconstruction technique. FINDINGS:    Evaluation limited by streak artifact from arms at side. Evaluation of soft  tissues and vessels suboptimal without vascular contrast.    Lower thorax: A few lung base streaky densities, likely atelectasis or scarring. Additional a few subtle hazy groundglass densities at the right lung base. Hepatobiliary: Liver unremarkable. Gallbladder unremarkable. No biliary duct  dilatation. Pancreas: Unremarkable. Spleen: Unremarkable. Adrenal glands: Right greater than left diffuse appearing adrenal gland  thickening, without well-defined nodule. Genitourinary: Right kidney unremarkable without hydronephrosis and without  nephrolithiasis identified. Left kidney unremarkable without hydronephrosis and  without nephrolithiasis identified. Bladder is collapsed with possible mild wall  thickening. Prostate unremarkable. Gastrointestinal: Stomach unremarkable. Small bowel loops are nondilated. The  colon is nondilated. Sigmoid diverticulosis with possible mild sigmoid wall  thickening, may be exaggerated by nondistention. Appendix appears normal.    Mesentery/vessels/nodes: No free air or free fluid. Scattered atherosclerotic  calcifications, otherwise major vessels unremarkable. No adenopathy by size  criteria. Miscellaneous: Small fat-containing left greater than right inguinal hernias. Superficial soft tissues otherwise unremarkable. Bones: Bilateral L5 pars defects with grade 1 anterolisthesis. Lower lumbar  facet arthropathy. Multilevel prominent lumbar spine ventral endplate  osteophytes.  Left acetabular plate and screw fixation with markedly severe left  hip degenerative changes characterized by complete joint space loss, large  osteophytes, and prominent subchondral cyst formation. Metallic possibly  ballistic fragment along the right inferior pubic ramus. Impression  IMPRESSION:    No clearly acute findings. Sigmoid diverticulosis with perhaps mild sigmoid wall thickening, may be  exaggerated by nondistention, possibly sequela of chronic diverticulosis. Appendix normal.    Equivocal bladder wall thickening, likely exaggerated by nondistention. Correlate clinically. Very few subtle hazy groundglass densities in the right lung base, possibly  subtle inflammatory infiltrates. Recommend follow-up CT thorax in 3 months to  reassess. Bilateral diffuse appearing adrenal gland thickening without well-defined  nodules. Small fat-containing inguinal hernias. Bony findings including left acetabular  prior hardware fixation and severe left hip degenerative changes, as well as  bilateral L5 pars defects with associated grade 1 anterolisthesis. See details above. XRAY (Most Recent)      EKG No results found for this or any previous visit.      2D ECHO

## 2022-01-27 NOTE — WOUND CARE
Physical Exam   Focused assessment   Patient received reclining in bed. A & O x 3. Patient noted to be incontinent of urine. His able to turn with minimal assistance onto his right side, however, when turning to the left, he seems unable to coordinate his movements in order to turn. He also becomes tearful at times. Sacrum with stage 2 pressure injury: 6x6.5cm. some yellow base but mostly pink. epithelialized edges. Minimal serosanguinous drainage. Left buttock with friction injury x 2, upper most wound: 2.5x1cm. yellow base with pink edges. Lower most wound: 1x1cm with pink base. Minimal serosanguinous drainage. Above stage 2 sacral injury is a suspected deep tissue injury: deep purple maroon colored intact blister. 2x1cm. Topical treatment protocol in place as follows:   Use one chux and one booster pad (in par room, white with pink stripe). Do not use folded chux. Clean wound to sacrum and buttocks then pat dry. Apply Therahoney  and cover with silicone dressing. Change every 2-3 days and prn soilage or dislodgement. Change foam dressing applied to Heels every 3 to 7 days. Peel back every shift to assess the skin and resecure  Assist with turning, remind patient to turn every two hours. Care turned over to nursing staff at this time. Lovely MAYERN, RN, [address] [place] [address],[address], [facility]
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
## 2022-01-27 NOTE — PROGRESS NOTES
Problem: Mobility Impaired (Adult and Pediatric)  Goal: *Acute Goals and Plan of Care (Insert Text)  Description: Physical Therapy Goals  Initiated 1/18/2022, re-evaluated 1/25/22 and goals adjusted as needed and to be accomplished within 7 day(s)  1. Patient will move from supine to sit and sit to supine , scoot up and down, and roll side to side in bed with supervision. 2.  Patient will transfer from bed to chair and chair to bed with CGA using the least restrictive device. 3.  Patient will perform sit to stand with CGA  4. Patient will ambulate with Sheyla for 20 feet with the least restrictive device. 5.  Patient will ascend/descend 4 stairs with unilateral handrail(s) with minimal assistance/contact guard assist.    PLOF: Pt reporting he ambulates without AD, assist sometimes for bed mobility. Lives with wife in 1 story house with 4 YURIY. Outcome: Progressing Towards Goal     PHYSICAL THERAPY TREATMENT    Patient: Wade Perry (4 y.o. male)  Date: 1/27/2022  Diagnosis: COVID [U07.1]  Pneumonia due to COVID-19 virus [U07.1, J12.82] <principal problem not specified>       Precautions: Contact,Skin,Other (comment) (droplet plus)    ASSESSMENT:  Pt seen with both PT and OT to maximize patients safety, mobility, and participation. Pt found semi-reclined in bed, in NAD, 6L o2 NC doffed out of nose. Assisted pt with putting on NC, unable to get reading of SPO2 after several attempts. Sup-sit with modAx2. Once sitting, pt becoming emotional at times, SPO2 down to 73%, edu pt on PLB technique but pt unable to perform this while sitting for ~8 minutes. Pt was eventually assisted back semi-reclined due to declining SPO2 readings with modAx2. MaxAx2 for scooting up towards [facility]. Once pt's HOB elevated and comfortable, SPO2 at 88%. Pt left with bed alarm on, call bell nearby, pt in NAD. Rn made aware of pt's O2 being off when entered room.      Progression toward goals:   []      Improving appropriately and progressing toward goals  [x]      Improving slowly and progressing toward goals  []      Not making progress toward goals and plan of care will be adjusted     PLAN:  Patient continues to benefit from skilled intervention to address the above impairments. Continue treatment per established plan of care. Discharge Recommendations:  Rehab  Further Equipment Recommendations for Discharge:  N/A     SUBJECTIVE:   Patient stated I can't breath.     OBJECTIVE DATA SUMMARY:   Critical Behavior:  Neurologic State: Alert  Orientation Level: Oriented to person  Cognition: Follows commands  Safety/Judgement: Fall prevention  Functional Mobility Training:  Bed Mobility:     Supine to Sit: Moderate assistance;Assist x2; Additional time  Sit to Supine: Moderate assistance;Assist x2          Balance:  Sitting: Intact; With support        Pain:  Pain level pre-treatment: 0/10  Pain level post-treatment: 0/10   Pain Intervention(s): Medication (see MAR); Rest, Ice, Repositioning   Response to intervention: Nurse notified, See doc flow    Activity Tolerance:   Pt tolerated mobility fair, limited by de-sat   Please refer to the flowsheet for vital signs taken during this treatment. After treatment:   [] Patient left in no apparent distress sitting up in chair  [x] Patient left in no apparent distress in bed  [x] Call bell left within reach  [x] Nursing notified  [] Caregiver present  [x] Bed alarm activated  [] SCDs applied      COMMUNICATION/EDUCATION:   [x]         Role of Physical Therapy in the acute care setting. [x]         Fall prevention education was provided and the patient/caregiver indicated understanding. [x]         Patient/family have participated as able in working toward goals and plan of care. []         Patient/family agree to work toward stated goals and plan of care. []         Patient understands intent and goals of therapy, but is neutral about his/her participation.   []         Patient is unable to participate in stated goals/plan of care: ongoing with therapy staff.  []         Other:        Malik Parr   Time Calculation: 23 mins

## 2022-01-27 NOTE — ROUTINE PROCESS
Bedside shift change report given to MICHELLE Gonzalez (oncoming nurse) by Homero Orosco RN (offgoing nurse). Report included the following information SBAR, Kardex, MAR and Cardiac Rhythm NSR.

## 2022-01-27 NOTE — PROGRESS NOTES
Problem: Self Care Deficits Care Plan (Adult)  Goal: *Acute Goals and Plan of Care (Insert Text)  Description: Occupational Therapy Goals  Re-evaluation 1/25/2022, pt is making slow but steady progress towards goals, pt requires re-direction for good participation d/t emotional lability displayed throughout tx session    1. Patient will perform grooming with supervision/set-up. 2.  Patient will perform bathing with minimal assistance/contact guard assist.  3.  Patient will perform upper body dressing and lower body dressing with minimal assistance/contact guard assist.  4.  Patient will perform bedside commode transfers with minimal assistance/contact guard assist using LRAD. 5.  Patient will perform all aspects of toileting with minimal assistance/contact guard assist.  6.  Patient will participate in upper extremity therapeutic exercise/activities with supervision/set-up for 8 minutes. 7.  Patient will utilize energy conservation techniques during functional activities with min verbal cues. Prior Level of Function: pt demonstrating difficulty providing accurate PLOF e.g. use of AD for functional mobility - pt states he used a walker and also states he does not use a walker. Pt indicated that his spouse assists with shower transfer and bathing tasks while seated on shower seat and spouse also assists with dressing tasks. Outcome: Progressing Towards Goal   OCCUPATIONAL THERAPY TREATMENT    Patient: Any Rebolledo (89 y.o. male)  Date: 1/27/2022  Diagnosis: COVID [U07.1]  Pneumonia due to COVID-19 virus [U07.1, J12.82] <principal problem not specified>       Precautions: Contact,Skin,Other (comment) (droplet plus)  PLOF: pt demonstrating difficulty providing accurate PLOF e.g. use of AD for functional mobility - pt states he used a walker and also states he does not use a walker.  Pt indicated that his spouse assists with shower transfer and bathing tasks while seated on shower seat and spouse also assists with dressing tasks. Chart, occupational therapy assessment, plan of care, and goals were reviewed. ASSESSMENT:  PT co tx to maximize pt safety and participation. Pt is emotional throughout the session, benefiting from therapeutic use of self and frequent redirection to task at hand. Pt presented supine in bed upon therapist arrival w/ 6L O2NC doffed. 6L O2NC re-donned and mult attempted made to get SPO2 reading and was unable. Pt was assisted to EOB from supine with MOD A x 2 in prep for functional task. Pt visibly SOB and was instructed to perform PLB technique. SPO2 finally reading at 73%. Pt tolerated sitting EOB ~ 8 mins before returning back to supine with MOD A x 2. He was positioned for comfort with SPO2 at 88% on 6L. He was left with HOB elevated and all needs left within reach. RN made aware of pt's O2 being off when entered room. Progression toward goals:  []          Improving appropriately and progressing toward goals  [x]          Improving slowly and progressing toward goals  []          Not making progress toward goals and plan of care will be adjusted     PLAN:  Patient continues to benefit from skilled intervention to address the above impairments. Continue treatment per established plan of care. Discharge Recommendations: Rehab   Further Equipment Recommendations for Discharge: TBA     SUBJECTIVE:   Patient stated  It is hard to breathe. \"     OBJECTIVE DATA SUMMARY:   Cognitive/Behavioral Status:  Neurologic State: Alert  Orientation Level: Oriented to person  Cognition: Follows commands  Safety/Judgement: Fall prevention    Functional Mobility and Transfers for ADLs:   Bed Mobility:     Supine to Sit: Moderate assistance;Assist x2; Additional time  Sit to Supine: Moderate assistance;Assist x2      Balance:  Sitting: Intact; With support    ADL Intervention:       Cognitive Retraining  Safety/Judgement: Fall prevention    Pain:  Pain level pre-treatment: 0/10   Pain level post-treatment: 0/10    Activity Tolerance:    Fair   Please refer to the flowsheet for vital signs taken during this treatment. After treatment:   []  Patient left in no apparent distress sitting up in chair  [x]  Patient left in no apparent distress in bed  [x]  Call bell left within reach  [x]  Nursing notified  []  Caregiver present  [x]  Bed alarm activated    COMMUNICATION/EDUCATION:   [x] Role of Occupational Therapy in the acute care setting  [] Home safety education was provided and the patient/caregiver indicated understanding. [x] Patient/family have participated as able in working towards goals and plan of care. [x] Patient/family agree to work toward stated goals and plan of care. [] Patient understands intent and goals of therapy, but is neutral about his/her participation. [] Patient is unable to participate in goal setting and plan of care.       Thank you for this referral.  LIZ Young  Time Calculation: 23 mins

## 2022-01-28 LAB
ALBUMIN SERPL-MCNC: 2.3 G/DL (ref 3.4–5)
ANION GAP SERPL CALC-SCNC: 4 MMOL/L (ref 3–18)
BUN SERPL-MCNC: 31 MG/DL (ref 7–18)
BUN/CREAT SERPL: 22 (ref 12–20)
CALCIUM SERPL-MCNC: 9.3 MG/DL (ref 8.5–10.1)
CHLORIDE SERPL-SCNC: 102 MMOL/L (ref 100–111)
CO2 SERPL-SCNC: 29 MMOL/L (ref 21–32)
CREAT SERPL-MCNC: 1.42 MG/DL (ref 0.6–1.3)
GLUCOSE BLD STRIP.AUTO-MCNC: 165 MG/DL (ref 70–110)
GLUCOSE BLD STRIP.AUTO-MCNC: 177 MG/DL (ref 70–110)
GLUCOSE BLD STRIP.AUTO-MCNC: 177 MG/DL (ref 70–110)
GLUCOSE BLD STRIP.AUTO-MCNC: 73 MG/DL (ref 70–110)
GLUCOSE SERPL-MCNC: 79 MG/DL (ref 74–99)
PHOSPHATE SERPL-MCNC: 4.3 MG/DL (ref 2.5–4.9)
POTASSIUM SERPL-SCNC: 4.4 MMOL/L (ref 3.5–5.5)
SODIUM SERPL-SCNC: 135 MMOL/L (ref 136–145)

## 2022-01-28 PROCEDURE — 2709999900 HC NON-CHARGEABLE SUPPLY

## 2022-01-28 PROCEDURE — 94640 AIRWAY INHALATION TREATMENT: CPT

## 2022-01-28 PROCEDURE — 74011000250 HC RX REV CODE- 250: Performed by: HOSPITALIST

## 2022-01-28 PROCEDURE — 74011636637 HC RX REV CODE- 636/637: Performed by: INTERNAL MEDICINE

## 2022-01-28 PROCEDURE — 77010033711 HC HIGH FLOW OXYGEN

## 2022-01-28 PROCEDURE — 80069 RENAL FUNCTION PANEL: CPT

## 2022-01-28 PROCEDURE — 74011636637 HC RX REV CODE- 636/637: Performed by: EMERGENCY MEDICINE

## 2022-01-28 PROCEDURE — 74011250637 HC RX REV CODE- 250/637: Performed by: FAMILY MEDICINE

## 2022-01-28 PROCEDURE — 99232 SBSQ HOSP IP/OBS MODERATE 35: CPT | Performed by: EMERGENCY MEDICINE

## 2022-01-28 PROCEDURE — 36415 COLL VENOUS BLD VENIPUNCTURE: CPT

## 2022-01-28 PROCEDURE — 82962 GLUCOSE BLOOD TEST: CPT

## 2022-01-28 PROCEDURE — 74011250637 HC RX REV CODE- 250/637: Performed by: EMERGENCY MEDICINE

## 2022-01-28 PROCEDURE — 65660000000 HC RM CCU STEPDOWN

## 2022-01-28 PROCEDURE — 74011250637 HC RX REV CODE- 250/637: Performed by: INTERNAL MEDICINE

## 2022-01-28 PROCEDURE — 77030040831 HC BAG URINE DRNG MDII -A

## 2022-01-28 RX ORDER — INSULIN GLARGINE 100 [IU]/ML
20 INJECTION, SOLUTION SUBCUTANEOUS DAILY
Status: DISCONTINUED | OUTPATIENT
Start: 2022-01-29 | End: 2022-01-29

## 2022-01-28 RX ADMIN — IPRATROPIUM BROMIDE AND ALBUTEROL 1 PUFF: 20; 100 SPRAY, METERED RESPIRATORY (INHALATION) at 12:24

## 2022-01-28 RX ADMIN — SODIUM CHLORIDE, PRESERVATIVE FREE 10 ML: 5 INJECTION INTRAVENOUS at 06:51

## 2022-01-28 RX ADMIN — IPRATROPIUM BROMIDE AND ALBUTEROL 1 PUFF: 20; 100 SPRAY, METERED RESPIRATORY (INHALATION) at 21:05

## 2022-01-28 RX ADMIN — MIRTAZAPINE 7.5 MG: 15 TABLET, FILM COATED ORAL at 22:03

## 2022-01-28 RX ADMIN — RIVAROXABAN 15 MG: 15 TABLET, FILM COATED ORAL at 18:23

## 2022-01-28 RX ADMIN — METOPROLOL TARTRATE 12.5 MG: 25 TABLET, FILM COATED ORAL at 09:33

## 2022-01-28 RX ADMIN — RIVAROXABAN 15 MG: 15 TABLET, FILM COATED ORAL at 09:33

## 2022-01-28 RX ADMIN — IPRATROPIUM BROMIDE AND ALBUTEROL 1 PUFF: 20; 100 SPRAY, METERED RESPIRATORY (INHALATION) at 09:04

## 2022-01-28 RX ADMIN — SODIUM CHLORIDE, PRESERVATIVE FREE 10 ML: 5 INJECTION INTRAVENOUS at 22:02

## 2022-01-28 RX ADMIN — INSULIN LISPRO 3 UNITS: 100 INJECTION, SOLUTION INTRAVENOUS; SUBCUTANEOUS at 12:17

## 2022-01-28 RX ADMIN — Medication 45 UNITS: at 09:37

## 2022-01-28 RX ADMIN — FAMOTIDINE 20 MG: 20 TABLET ORAL at 09:33

## 2022-01-28 RX ADMIN — Medication 5000 UNITS: at 09:33

## 2022-01-28 RX ADMIN — METOPROLOL TARTRATE 12.5 MG: 25 TABLET, FILM COATED ORAL at 22:03

## 2022-01-28 RX ADMIN — Medication 3 UNITS: at 22:06

## 2022-01-28 RX ADMIN — Medication 3 UNITS: at 12:17

## 2022-01-28 RX ADMIN — Medication 5 MG: at 22:03

## 2022-01-28 NOTE — PROGRESS NOTES
ARU/IPR REFERRAL CONTACT NOTE  [address] [place] [address] for Physical Rehabilitation    RE: Hiral Everette     Thank you for the opportunity to review this patient's case for admission to [address] [place] [address] for Physical Rehabilitation. Based on our pre-admission screening and reviewing with out team:     [x ] This patient does not meet criteria for admission to [facility] for Physical Rehabilitation due to:    [x ] Too low level, per documentation, patient has not demonstrated tolerance for acute rehabilitation level of intensity. Again, Thank you for this referral. Should you have any questions please do not hesitate to call. Sincerely,  Merrick Trejo. Lynette Church, [address]   Lynette Church, RN  Admissions [facility] for Physical Rehabilitation  [phone]

## 2022-01-28 NOTE — ROUTINE PROCESS
Bedside and Verbal shift change report given to AIDE Gonzalez (oncoming nurse) by JESÚS Cole RN (offgoing nurse). Report included the following information SBAR, Kardex, MAR and Recent Results.

## 2022-01-28 NOTE — PROGRESS NOTES
helped the patient Kyle Espinoza, who is a 46 y.o.,male, connect with the family Tim Harkins, wife) with [employer] Video Connection. Assessment:  There are no further spiritual or Jehovah's witness issues which require Spiritual Care Services interventions at this time. Plan:  Chaplains will continue to follow and will provide pastoral care on an as needed/requested basis.  recommends bedside caregivers page  on duty if patient shows signs of acute spiritual or emotional distress. Isabel Madden MDiv.   [facility]   [phone]

## 2022-01-28 NOTE — PROGRESS NOTES
ARU/IPR REFERRAL CONTACT NOTE  [address] [place] [address] for Physical Rehabilitation    RE: Lawrecne Copeland   Thank you for the opportunity to review this patient's case for admission to [address] [place] [address] for Physical Rehabilitation. Based on our pre-admission screening:     [x ] Our Team/Medical Director is following this case. Comments: Patient is unable to do any out of bed therapy at this time. He was having difficulty with declining SP02 levels during therapy while on 6L of O2. Patient must be able to do out of bed therapy and be able to tolerate 3 hours of therapy a day, 5 days a week. Will continue to follow. Again, Thank you for this referral. Should you have any questions please do not hesitate to call. Sincerely,  Thaddeus Love. Sneha Merritt, [address]   Sneha Merritt RN  Admissions [facility] for Physical Rehabilitation  [phone]

## 2022-01-28 NOTE — DIABETES MGMT
Diabetes/ Glycemic Control Plan of Care  Recommendation(s):  1. Suggest lowering Lantus to 20 units/day and discontinuing meal time lispro now that steroids are completed. 2. Continue corrective lispro ACHS as needed    2868  Addendum:  Order obtained/received for 20 units Lantus/day and meal time lispro discontinued    Assessment:   Pt with known history of T2DM (poorly controlled with A1C - 7.8%; home regimen of combination pill - DPP-4i/ metformin). Last dose of Decadron was  1/26/22. FBG this morning -  73 mg/dL. Insulin needs appear to be trending down since steroids completed. Fasting/ Morning blood glucose:   Lab Results   Component Value Date/Time    Glucose 79 01/28/2022 04:27 AM    Glucose (POC) 165 (H) 01/28/2022 11:58 AM     IV Fluids containing dextrose: none  Steroids:  None     Blood glucose values: Within target range (70-180mg/dL):  progressing        Current insulin orders:   lantus 45 units daily  3 units meal time  lispro TID AC  Corrective humalog, very insulin resistant, 4 times daily    Total Daily Dose previous 24 hours =  54 units (45 Lantus, 3 meal time lispro, 6 corrective lispro)    Current A1c:   Lab Results   Component Value Date/Time    Hemoglobin A1c 7.8 (H) 01/10/2022 05:10 AM      equivalent  to ave Blood Glucose of  177 mg/dl for 2-3 months prior to admission  Adequate glycemic control PTA: no    Nutrition/Diet:   Active Orders   Diet    ADULT DIET Regular; 4 carb choices (60 gm/meal); Low Fat/Low Chol/High Fiber/2 gm Na; Low Potassium (Less than 3000 mg/day);  No Concentrated Sweets      Meal Intake:  Patient Vitals for the past 168 hrs:   % Diet Eaten   01/26/22 0842 26 - 50%   01/25/22 1225 76 - 100%   01/25/22 0805 26 - 50%   01/24/22 1659 0%   01/24/22 1315 1 - 25%   01/24/22 0825 26 - 50%     Supplement Intake:  Patient Vitals for the past 168 hrs:   Supplement intake %   01/25/22 0805 76 - 100%   01/24/22 1659 0%   01/24/22 1315 76 - 100%       Home diabetes medications:   Key Antihyperglycemic Medications             SITagliptin-metFORMIN (JANUMET) 50-1,000 mg per tablet Take 1 Tab by mouth two (2) times daily (with meals). Plan/Goals:   Blood glucose will be within target of 70 - 180 mg/dl within 72 hours  Reinforce dietary and medication compliance at home. Education:  [] Refer to Diabetes Education Record                       [x] Education not indicated at this time   1/24/22:Variable po intake per I/O's (less than 50% meals per I/O's;  100% supplement 1/24/22). Attempted to call pt to verify appetite status but no answer. Spoke with CNA who confirmed pt taking < 50% meals and inconsistently taking po supplements and usually only with encouragement and prompting.       Damaris Resendez,  MS, RD, [facility]  Diabetes and Glycemic Control   [employer]

## 2022-01-28 NOTE — PROGRESS NOTES
Hospitalist Progress Note    Patient: Susie Graham Age: 46 y.o. : 1970 MR#: [id number] SSN: [id number]  Date/Time: 2022     DOA: 2022  PCP: Michelle Ron MD    Subjective:     Patient is sitting in bed in no apparent distress, awake, follows commands, has dyspnea on exertion. Still on 7 L oxygen via nasal cannula      Assessment/Plan:     1. Acute respiratory failure with hypoxia due to COVID-19 infection         2. COVID-19 pneumonia       -treated with decadrone [id number]   3. MONICA on CKD3, associate with covid infection, resolving  4. Elevated troponin, demand ischemia,   5. Hyperglycemia with T2DM, HgbA1c 7.8%  6. Acute DVT on legs, associate hypercoagulable state due to covid         -was on heparin gtt, now [place] [facility]   7. H/o stroke with right-sided hemiparesis   8. . Stage 2 pressure ulcer of the gluteal cleft, POA   9.    Hypomagnesemia, replaced     Encourage incentive spirometry  Bronchial hygiene, wean oxygen  Status post dexamethasone  Decrease Lantus, sliding scale insulin, diabetic educator is following  On Xarelto  Combivent  Melatonin  pepcid  Avoid nephrotoxic meds, nephrology is following  PT and OT are recommending rehab  Full code   Disposition-rehab  I called patient's wife at phone #[phone] again today and left a message      Additional Notes:        Case discussed with:  [x]Patient  []Family  [x]Nursing  [x]Case Management  DVT Prophylaxis:  []Lovenox  [x]Xarelto  []SCDs  []Coumadin   []On Heparin gtt    Signed By: Drew Che MD     2022               Objective:   VS:   Visit Vitals  /72   Pulse 74   Temp 97.5 °F (36.4 °C)   Resp 18   Ht 5' 11\" (1.803 m)   Wt 92.1 kg (203 lb)   SpO2 90%   BMI 28.31 kg/m²      Tmax/24hrs: Temp (24hrs), Av.4 °F (36.3 °C), Min:97.2 °F (36.2 °C), Max:97.5 °F (36.4 °C)    No intake or output data in the 24 hours ending 22 [id number]    General:  Awake, follows commands  Cardiovascular:  S1S2+, RRR  Pulmonary: Coarse breath sounds bilaterally  GI:  Soft, BS+, NT, ND  Extremities:  No edema  Has right-sided weakness from a prior stroke        Current Facility-Administered Medications   Medication Dose Route Frequency    [START ON 1/29/2022] insulin glargine (LANTUS) injection 20 Units  20 Units SubCUTAneous DAILY    ipratropium-albuterol (COMBIVENT RESPIMAT) 20 mcg-100 mcg inhalation spray  1 Puff Inhalation QID RT    mirtazapine (REMERON) tablet 7.5 mg  7.5 mg Oral QHS    metoprolol tartrate (LOPRESSOR) tablet 12.5 mg  12.5 mg Oral Q12H    dextrose 10% infusion 125-250 mL  125-250 mL IntraVENous PRN    hydrALAZINE (APRESOLINE) 20 mg/mL injection 10 mg  10 mg IntraVENous Q6H PRN    [Held by provider] amLODIPine (NORVASC) tablet 10 mg  10 mg Oral DAILY    rivaroxaban (XARELTO) tablet 15 mg  15 mg Oral BID WITH MEALS    sodium chloride (NS) flush 5-40 mL  5-40 mL IntraVENous Q8H    sodium chloride (NS) flush 5-40 mL  5-40 mL IntraVENous PRN    acetaminophen (TYLENOL) tablet 650 mg  650 mg Oral Q6H PRN    Or    acetaminophen (TYLENOL) suppository 650 mg  650 mg Rectal Q6H PRN    polyethylene glycol (MIRALAX) packet 17 g  17 g Oral DAILY PRN    ondansetron (ZOFRAN ODT) tablet 4 mg  4 mg Oral Q8H PRN    Or    ondansetron (ZOFRAN) injection 4 mg  4 mg IntraVENous Q6H PRN    insulin lispro (HUMALOG) injection   SubCUTAneous AC&HS    glucose chewable tablet 16 g  4 Tablet Oral PRN    glucagon (GLUCAGEN) injection 1 mg  1 mg IntraMUSCular PRN    famotidine (PEPCID) tablet 20 mg  20 mg Oral DAILY    melatonin tablet 5 mg  5 mg Oral QHS    cholecalciferol (VITAMIN D3) capsule 5,000 Units  5,000 Units Oral DAILY            Lab/Data Review:  Labs: Results:       Chemistry Recent Labs     01/28/22  0427 01/27/22  0158 01/26/22  0053   GLU 79 112* 187*   * 135* 133*   K 4.4 4.9 5.2    102 103   CO2 29 28 23   BUN 31* 38* 44*   CREA 1.42* 1.44* 1.35*   BUCR 22* 26* 33*   AGAP 4 5 7   CA 9.3 9.3 9.1   PHOS 4.3 4.0 3.8     Recent Labs     01/28/22  0427 01/27/22  0158 01/26/22  0053   ALB 2.3* 2.3* 2.3*      CBC w/Diff Recent Labs     01/26/22  0053   WBC 11.7   RBC 4.57   HGB 13.0   HCT 39.5   MCV 86.4   MCH 28.4   MCHC 32.9   RDW 13.1      GRANS 86*   LYMPH 6*   EOS 0      Coagulation No results for input(s): PTP, INR, APTT, INREXT, INREXT in the last 72 hours. Iron/Ferritin Lab Results   Component Value Date/Time    Ferritin 1,426 (H) 01/09/2022 08:34 PM       BNP    Cardiac Enzymes Lab Results   Component Value Date/Time     (H) 01/11/2022 03:39 PM        Lactic Acid    Thyroid Studies          All Micro Results     Procedure Component Value Units Date/Time    COVID-19 RAPID TEST [[id number]]  (Abnormal) Collected: 01/09/22 2042    Order Status: Completed Specimen: Nasopharyngeal Updated: 01/09/22 2251     Specimen source Nasopharyngeal        COVID-19 rapid test Detected        Comment:      The specimen is POSITIVE for SARS-CoV-2, the novel coronavirus associated with COVID-19. This test has been authorized by the FDA under an Emergency Use Authorization (EUA) for use by authorized laboratories. Fact sheet for Healthcare Providers: ConventionUpdate.co.nz  Fact sheet for Patients: ConventionUpdate.co.nz       Methodology: Isothermal Nucleic Acid Amplification  CALLED TO AND CORRECTLY REPEATED BY:  SEDA TRINIDAD RN IN ED AT 2966 ON 01/9/22 TO [facility]                 Images:    CT (Most Recent). CT Results (most recent):  Results from Hospital Encounter encounter on 01/18/19    CT ABD PELV WO CONT    Narrative  EXAMINATION: CT abdomen/pelvis without contrast    INDICATION: Abdominal pain, stomach pain, diarrhea    COMPARISON: None    TECHNIQUE: CT of the abdomen and pelvis performed without contrast, with  multiplanar reformations.  All CT scans at this facility are performed using dose  optimization technique as appropriate to a performed exam, to include automated  exposure control, adjustment of the mA and/or kV according to patient size  (including appropriate matching first site specific examinations), or use of  iterative reconstruction technique. FINDINGS:    Evaluation limited by streak artifact from arms at side. Evaluation of soft  tissues and vessels suboptimal without vascular contrast.    Lower thorax: A few lung base streaky densities, likely atelectasis or scarring. Additional a few subtle hazy groundglass densities at the right lung base. Hepatobiliary: Liver unremarkable. Gallbladder unremarkable. No biliary duct  dilatation. Pancreas: Unremarkable. Spleen: Unremarkable. Adrenal glands: Right greater than left diffuse appearing adrenal gland  thickening, without well-defined nodule. Genitourinary: Right kidney unremarkable without hydronephrosis and without  nephrolithiasis identified. Left kidney unremarkable without hydronephrosis and  without nephrolithiasis identified. Bladder is collapsed with possible mild wall  thickening. Prostate unremarkable. Gastrointestinal: Stomach unremarkable. Small bowel loops are nondilated. The  colon is nondilated. Sigmoid diverticulosis with possible mild sigmoid wall  thickening, may be exaggerated by nondistention. Appendix appears normal.    Mesentery/vessels/nodes: No free air or free fluid. Scattered atherosclerotic  calcifications, otherwise major vessels unremarkable. No adenopathy by size  criteria. Miscellaneous: Small fat-containing left greater than right inguinal hernias. Superficial soft tissues otherwise unremarkable. Bones: Bilateral L5 pars defects with grade 1 anterolisthesis. Lower lumbar  facet arthropathy. Multilevel prominent lumbar spine ventral endplate  osteophytes. Left acetabular plate and screw fixation with markedly severe left  hip degenerative changes characterized by complete joint space loss, large  osteophytes, and prominent subchondral cyst formation. Metallic possibly  ballistic fragment along the right inferior pubic ramus. Impression  IMPRESSION:    No clearly acute findings. Sigmoid diverticulosis with perhaps mild sigmoid wall thickening, may be  exaggerated by nondistention, possibly sequela of chronic diverticulosis. Appendix normal.    Equivocal bladder wall thickening, likely exaggerated by nondistention. Correlate clinically. Very few subtle hazy groundglass densities in the right lung base, possibly  subtle inflammatory infiltrates. Recommend follow-up CT thorax in 3 months to  reassess. Bilateral diffuse appearing adrenal gland thickening without well-defined  nodules. Small fat-containing inguinal hernias. Bony findings including left acetabular  prior hardware fixation and severe left hip degenerative changes, as well as  bilateral L5 pars defects with associated grade 1 anterolisthesis. See details above. XRAY (Most Recent)      EKG No results found for this or any previous visit.      2D ECHO

## 2022-01-29 LAB
ALBUMIN SERPL-MCNC: 2.3 G/DL (ref 3.4–5)
ANION GAP SERPL CALC-SCNC: 1 MMOL/L (ref 3–18)
BUN SERPL-MCNC: 28 MG/DL (ref 7–18)
BUN/CREAT SERPL: 19 (ref 12–20)
CALCIUM SERPL-MCNC: 9.2 MG/DL (ref 8.5–10.1)
CHLORIDE SERPL-SCNC: 106 MMOL/L (ref 100–111)
CO2 SERPL-SCNC: 29 MMOL/L (ref 21–32)
CREAT SERPL-MCNC: 1.51 MG/DL (ref 0.6–1.3)
GLUCOSE BLD STRIP.AUTO-MCNC: 258 MG/DL (ref 70–110)
GLUCOSE BLD STRIP.AUTO-MCNC: 79 MG/DL (ref 70–110)
GLUCOSE BLD STRIP.AUTO-MCNC: 88 MG/DL (ref 70–110)
GLUCOSE BLD STRIP.AUTO-MCNC: 93 MG/DL (ref 70–110)
GLUCOSE SERPL-MCNC: 97 MG/DL (ref 74–99)
PHOSPHATE SERPL-MCNC: 4.2 MG/DL (ref 2.5–4.9)
POTASSIUM SERPL-SCNC: 4.6 MMOL/L (ref 3.5–5.5)
SODIUM SERPL-SCNC: 136 MMOL/L (ref 136–145)

## 2022-01-29 PROCEDURE — 2709999900 HC NON-CHARGEABLE SUPPLY

## 2022-01-29 PROCEDURE — 99232 SBSQ HOSP IP/OBS MODERATE 35: CPT | Performed by: EMERGENCY MEDICINE

## 2022-01-29 PROCEDURE — 74011636637 HC RX REV CODE- 636/637: Performed by: EMERGENCY MEDICINE

## 2022-01-29 PROCEDURE — 74011250637 HC RX REV CODE- 250/637: Performed by: FAMILY MEDICINE

## 2022-01-29 PROCEDURE — 74011250637 HC RX REV CODE- 250/637: Performed by: INTERNAL MEDICINE

## 2022-01-29 PROCEDURE — 65660000000 HC RM CCU STEPDOWN

## 2022-01-29 PROCEDURE — 94640 AIRWAY INHALATION TREATMENT: CPT

## 2022-01-29 PROCEDURE — 36415 COLL VENOUS BLD VENIPUNCTURE: CPT

## 2022-01-29 PROCEDURE — 74011636637 HC RX REV CODE- 636/637: Performed by: INTERNAL MEDICINE

## 2022-01-29 PROCEDURE — 74011000250 HC RX REV CODE- 250: Performed by: HOSPITALIST

## 2022-01-29 PROCEDURE — 77030040392 HC DRSG OPTIFOAM MDII -A

## 2022-01-29 PROCEDURE — 74011250637 HC RX REV CODE- 250/637: Performed by: EMERGENCY MEDICINE

## 2022-01-29 PROCEDURE — 80069 RENAL FUNCTION PANEL: CPT

## 2022-01-29 PROCEDURE — 82962 GLUCOSE BLOOD TEST: CPT

## 2022-01-29 RX ORDER — INSULIN GLARGINE 100 [IU]/ML
5 INJECTION, SOLUTION SUBCUTANEOUS DAILY
Status: DISCONTINUED | OUTPATIENT
Start: 2022-01-30 | End: 2022-01-29

## 2022-01-29 RX ADMIN — IPRATROPIUM BROMIDE AND ALBUTEROL 1 PUFF: 20; 100 SPRAY, METERED RESPIRATORY (INHALATION) at 21:00

## 2022-01-29 RX ADMIN — SODIUM CHLORIDE, PRESERVATIVE FREE 10 ML: 5 INJECTION INTRAVENOUS at 14:09

## 2022-01-29 RX ADMIN — FAMOTIDINE 20 MG: 20 TABLET ORAL at 08:59

## 2022-01-29 RX ADMIN — Medication 5000 UNITS: at 08:59

## 2022-01-29 RX ADMIN — RIVAROXABAN 15 MG: 15 TABLET, FILM COATED ORAL at 17:57

## 2022-01-29 RX ADMIN — Medication 9 UNITS: at 16:21

## 2022-01-29 RX ADMIN — METOPROLOL TARTRATE 12.5 MG: 25 TABLET, FILM COATED ORAL at 23:40

## 2022-01-29 RX ADMIN — METOPROLOL TARTRATE 12.5 MG: 25 TABLET, FILM COATED ORAL at 09:00

## 2022-01-29 RX ADMIN — INSULIN GLARGINE 20 UNITS: 100 INJECTION, SOLUTION SUBCUTANEOUS at 12:45

## 2022-01-29 RX ADMIN — MIRTAZAPINE 7.5 MG: 15 TABLET, FILM COATED ORAL at 23:40

## 2022-01-29 RX ADMIN — RIVAROXABAN 15 MG: 15 TABLET, FILM COATED ORAL at 09:00

## 2022-01-29 RX ADMIN — SODIUM CHLORIDE, PRESERVATIVE FREE 10 ML: 5 INJECTION INTRAVENOUS at 05:18

## 2022-01-29 RX ADMIN — SODIUM CHLORIDE, PRESERVATIVE FREE 10 ML: 5 INJECTION INTRAVENOUS at 23:41

## 2022-01-29 RX ADMIN — Medication 5 MG: at 23:40

## 2022-01-29 NOTE — PROGRESS NOTES
New PT order received and chart reviewed. Patient was evaluated by PT and is currently on caseload. Please see notes for further detail. Will acknowledge new order. Thank you.      Cheryl Meyers PT, DPT

## 2022-01-29 NOTE — ROUTINE PROCESS
Bedside and Verbal shift change report given to Amber Foss (oncoming nurse) by Álvaro Milan (offgoing nurse). Report included the following information SBAR and Kardex.

## 2022-01-29 NOTE — PROGRESS NOTES
Hospitalist Progress Note    Patient: Wade Perry Age: 46 y.o. : 1970 MR#: [id number] SSN: [id number]  Date/Time: 2022     DOA: 2022  PCP: Richie Brooks MD    Subjective:     Patient is laying in bed in no apparent distress, awake, follows commands, denies any chest pain. Now down to 4 L oxygen via nasal cannula      Assessment/Plan:     1. Acute respiratory failure with hypoxia due to COVID-19 infection         2. COVID-19 pneumonia       -treated with decadrone [id number]   3. MONICA on CKD3, associate with covid infection, resolving  4. Elevated troponin, demand ischemia,   5. Hyperglycemia with T2DM, HgbA1c 7.8%  6. Acute DVT on legs, associate hypercoagulable state due to covid         -was on heparin gtt, now tolerated [address]   7. H/o stroke with right-sided hemiparesis   8. . Stage 2 pressure ulcer of the gluteal cleft, POA   9. Hypomagnesemia, replaced     Incentive spirometry, bronchial hygiene  Wean oxygen  Status post dexamethasone  Discontinue Lantus, sliding scale insulin  On Xarelto  Combivent  Melatonin  pepcid  Avoid nephrotoxic meds, nephrology is following  PT and OT are recommending rehab  Full code   [address] [facility] rehab  Discussed with RN  I called patient's wife at phone #[phone] and discussed with her regarding patient's care and plans to transition to rehab when a bed is available.     Patient is medically stable to transition to rehab/SNF    Additional Notes:        Case discussed with:  [x]Patient  []Family  [x]Nursing  [x]Case Management  DVT Prophylaxis:  []Lovenox  [x]Xarelto  []SCDs  []Coumadin   []On Heparin gtt    Signed By: Loly Basilio MD     2022               Objective:   VS:   Visit Vitals  /84   Pulse 75   Temp 97.1 °F (36.2 °C)   Resp 20   Ht 5' 11\" (1.803 m)   Wt 92.1 kg (203 lb)   SpO2 92%   BMI 28.31 kg/m²      Tmax/24hrs: Temp (24hrs), Av.7 °F (36.5 °C), Min:97.1 °F (36.2 °C), Max:98.1 °F (36.7 °C)    No intake or output data in the 24 hours ending 01/29/22 1523    General:  Awake, follows commands  Cardiovascular:  S1S2+, RRR  Pulmonary:  CTA b/l  GI:  Soft, BS+, NT, ND  Extremities:  No edema  Right-sided weakness        Current Facility-Administered Medications   Medication Dose Route Frequency    insulin glargine (LANTUS) injection 20 Units  20 Units SubCUTAneous DAILY    ipratropium-albuterol (COMBIVENT RESPIMAT) 20 mcg-100 mcg inhalation spray  1 Puff Inhalation QID RT    mirtazapine (REMERON) tablet 7.5 mg  7.5 mg Oral QHS    metoprolol tartrate (LOPRESSOR) tablet 12.5 mg  12.5 mg Oral Q12H    dextrose 10% infusion 125-250 mL  125-250 mL IntraVENous PRN    hydrALAZINE (APRESOLINE) 20 mg/mL injection 10 mg  10 mg IntraVENous Q6H PRN    [Held by provider] amLODIPine (NORVASC) tablet 10 mg  10 mg Oral DAILY    rivaroxaban (XARELTO) tablet 15 mg  15 mg Oral BID WITH MEALS    sodium chloride (NS) flush 5-40 mL  5-40 mL IntraVENous Q8H    sodium chloride (NS) flush 5-40 mL  5-40 mL IntraVENous PRN    acetaminophen (TYLENOL) tablet 650 mg  650 mg Oral Q6H PRN    Or    acetaminophen (TYLENOL) suppository 650 mg  650 mg Rectal Q6H PRN    polyethylene glycol (MIRALAX) packet 17 g  17 g Oral DAILY PRN    ondansetron (ZOFRAN ODT) tablet 4 mg  4 mg Oral Q8H PRN    Or    ondansetron (ZOFRAN) injection 4 mg  4 mg IntraVENous Q6H PRN    insulin lispro (HUMALOG) injection   SubCUTAneous AC&HS    glucose chewable tablet 16 g  4 Tablet Oral PRN    glucagon (GLUCAGEN) injection 1 mg  1 mg IntraMUSCular PRN    famotidine (PEPCID) tablet 20 mg  20 mg Oral DAILY    melatonin tablet 5 mg  5 mg Oral QHS    cholecalciferol (VITAMIN D3) capsule 5,000 Units  5,000 Units Oral DAILY            Lab/Data Review:  Labs: Results:       Chemistry Recent Labs     01/29/22  0148 01/28/22  0427 01/27/22  0158   GLU 97 79 112*    135* 135*   K 4.6 4.4 4.9    102 102   CO2 29 29 28   BUN 28* 31* 38*   CREA 1.51* 1.42* 1.44*   BUCR 19 22* 26*   AGAP 1* 4 5   CA 9.2 9.3 9.3   PHOS 4.2 4.3 4.0     Recent Labs     01/29/22  0148 01/28/22  0427 01/27/22  0158   ALB 2.3* 2.3* 2.3*      CBC w/Diff No results for input(s): WBC, RBC, HGB, HCT, MCV, MCH, MCHC, RDW, PLT, BANDS, GRANS, LYMPH, EOS, HGBEXT, HCTEXT, PLTEXT, HGBEXT, HCTEXT, PLTEXT in the last 72 hours. Coagulation No results for input(s): PTP, INR, APTT, INREXT, INREXT in the last 72 hours. Iron/Ferritin Lab Results   Component Value Date/Time    Ferritin 1,426 (H) 01/09/2022 08:34 PM       BNP    Cardiac Enzymes Lab Results   Component Value Date/Time     (H) 01/11/2022 03:39 PM        Lactic Acid    Thyroid Studies          All Micro Results     Procedure Component Value Units Date/Time    COVID-19 RAPID TEST [[id number]]  (Abnormal) Collected: 01/09/22 2042    Order Status: Completed Specimen: Nasopharyngeal Updated: 01/09/22 2259     Specimen source Nasopharyngeal        COVID-19 rapid test Detected        Comment:      The specimen is POSITIVE for SARS-CoV-2, the novel coronavirus associated with COVID-19. This test has been authorized by the FDA under an Emergency Use Authorization (EUA) for use by authorized laboratories. Fact sheet for Healthcare Providers: iTendency.uy  Fact sheet for Patients: iTendency.uy       Methodology: Isothermal Nucleic Acid Amplification  CALLED TO AND CORRECTLY REPEATED BY:  SEDA TRINIDAD RN IN ED AT 7478 ON 01/9/22 TO [facility]                 Images:    CT (Most Recent). CT Results (most recent):  Results from Hospital Encounter encounter on 01/18/19    CT ABD PELV WO CONT    Narrative  EXAMINATION: CT abdomen/pelvis without contrast    INDICATION: Abdominal pain, stomach pain, diarrhea    COMPARISON: None    TECHNIQUE: CT of the abdomen and pelvis performed without contrast, with  multiplanar reformations.  All CT scans at this facility are performed using dose  optimization technique as appropriate to a performed exam, to include automated  exposure control, adjustment of the mA and/or kV according to patient size  (including appropriate matching first site specific examinations), or use of  iterative reconstruction technique. FINDINGS:    Evaluation limited by streak artifact from arms at side. Evaluation of soft  tissues and vessels suboptimal without vascular contrast.    Lower thorax: A few lung base streaky densities, likely atelectasis or scarring. Additional a few subtle hazy groundglass densities at the right lung base. Hepatobiliary: Liver unremarkable. Gallbladder unremarkable. No biliary duct  dilatation. Pancreas: Unremarkable. Spleen: Unremarkable. Adrenal glands: Right greater than left diffuse appearing adrenal gland  thickening, without well-defined nodule. Genitourinary: Right kidney unremarkable without hydronephrosis and without  nephrolithiasis identified. Left kidney unremarkable without hydronephrosis and  without nephrolithiasis identified. Bladder is collapsed with possible mild wall  thickening. Prostate unremarkable. Gastrointestinal: Stomach unremarkable. Small bowel loops are nondilated. The  colon is nondilated. Sigmoid diverticulosis with possible mild sigmoid wall  thickening, may be exaggerated by nondistention. Appendix appears normal.    Mesentery/vessels/nodes: No free air or free fluid. Scattered atherosclerotic  calcifications, otherwise major vessels unremarkable. No adenopathy by size  criteria. Miscellaneous: Small fat-containing left greater than right inguinal hernias. Superficial soft tissues otherwise unremarkable. Bones: Bilateral L5 pars defects with grade 1 anterolisthesis. Lower lumbar  facet arthropathy. Multilevel prominent lumbar spine ventral endplate  osteophytes.  Left acetabular plate and screw fixation with markedly severe left  hip degenerative changes characterized by complete joint space loss, large  osteophytes, and prominent subchondral cyst formation. Metallic possibly  ballistic fragment along the right inferior pubic ramus. Impression  IMPRESSION:    No clearly acute findings. Sigmoid diverticulosis with perhaps mild sigmoid wall thickening, may be  exaggerated by nondistention, possibly sequela of chronic diverticulosis. Appendix normal.    Equivocal bladder wall thickening, likely exaggerated by nondistention. Correlate clinically. Very few subtle hazy groundglass densities in the right lung base, possibly  subtle inflammatory infiltrates. Recommend follow-up CT thorax in 3 months to  reassess. Bilateral diffuse appearing adrenal gland thickening without well-defined  nodules. Small fat-containing inguinal hernias. Bony findings including left acetabular  prior hardware fixation and severe left hip degenerative changes, as well as  bilateral L5 pars defects with associated grade 1 anterolisthesis. See details above. XRAY (Most Recent)      EKG No results found for this or any previous visit.      2D ECHO

## 2022-01-29 NOTE — ROUTINE PROCESS
Bedside and Verbal shift change report given to Marianela Zurita RN (oncoming nurse) by JSEÚS Cole RN (offgoing nurse). Report included the following information SBAR, Kardex, MAR and Recent Results.

## 2022-01-30 LAB
ALBUMIN SERPL-MCNC: 2 G/DL (ref 3.4–5)
ANION GAP SERPL CALC-SCNC: 4 MMOL/L (ref 3–18)
BUN SERPL-MCNC: 24 MG/DL (ref 7–18)
BUN/CREAT SERPL: 18 (ref 12–20)
CALCIUM SERPL-MCNC: 8.4 MG/DL (ref 8.5–10.1)
CHLORIDE SERPL-SCNC: 104 MMOL/L (ref 100–111)
CO2 SERPL-SCNC: 27 MMOL/L (ref 21–32)
CREAT SERPL-MCNC: 1.31 MG/DL (ref 0.6–1.3)
ERYTHROCYTE [DISTWIDTH] IN BLOOD BY AUTOMATED COUNT: 13.9 % (ref 11.6–14.5)
GLUCOSE BLD STRIP.AUTO-MCNC: 112 MG/DL (ref 70–110)
GLUCOSE BLD STRIP.AUTO-MCNC: 122 MG/DL (ref 70–110)
GLUCOSE BLD STRIP.AUTO-MCNC: 194 MG/DL (ref 70–110)
GLUCOSE BLD STRIP.AUTO-MCNC: 218 MG/DL (ref 70–110)
GLUCOSE SERPL-MCNC: 210 MG/DL (ref 74–99)
HCT VFR BLD AUTO: 36.5 % (ref 36–48)
HGB BLD-MCNC: 11.8 G/DL (ref 13–16)
MCH RBC QN AUTO: 28 PG (ref 24–34)
MCHC RBC AUTO-ENTMCNC: 32.3 G/DL (ref 31–37)
MCV RBC AUTO: 86.5 FL (ref 78–100)
NRBC # BLD: 0 K/UL (ref 0–0.01)
NRBC BLD-RTO: 0 PER 100 WBC
PHOSPHATE SERPL-MCNC: 3.7 MG/DL (ref 2.5–4.9)
PLATELET # BLD AUTO: 120 K/UL (ref 135–420)
PMV BLD AUTO: 11.7 FL (ref 9.2–11.8)
POTASSIUM SERPL-SCNC: 4.3 MMOL/L (ref 3.5–5.5)
RBC # BLD AUTO: 4.22 M/UL (ref 4.35–5.65)
SODIUM SERPL-SCNC: 135 MMOL/L (ref 136–145)
WBC # BLD AUTO: 7.6 K/UL (ref 4.6–13.2)

## 2022-01-30 PROCEDURE — 77010033678 HC OXYGEN DAILY

## 2022-01-30 PROCEDURE — 82962 GLUCOSE BLOOD TEST: CPT

## 2022-01-30 PROCEDURE — 65660000000 HC RM CCU STEPDOWN

## 2022-01-30 PROCEDURE — 94640 AIRWAY INHALATION TREATMENT: CPT

## 2022-01-30 PROCEDURE — 94761 N-INVAS EAR/PLS OXIMETRY MLT: CPT

## 2022-01-30 PROCEDURE — 99232 SBSQ HOSP IP/OBS MODERATE 35: CPT | Performed by: EMERGENCY MEDICINE

## 2022-01-30 PROCEDURE — 74011250637 HC RX REV CODE- 250/637: Performed by: INTERNAL MEDICINE

## 2022-01-30 PROCEDURE — 74011000250 HC RX REV CODE- 250: Performed by: HOSPITALIST

## 2022-01-30 PROCEDURE — 2709999900 HC NON-CHARGEABLE SUPPLY

## 2022-01-30 PROCEDURE — 77030040831 HC BAG URINE DRNG MDII -A

## 2022-01-30 PROCEDURE — 36415 COLL VENOUS BLD VENIPUNCTURE: CPT

## 2022-01-30 PROCEDURE — 85027 COMPLETE CBC AUTOMATED: CPT

## 2022-01-30 PROCEDURE — 74011250637 HC RX REV CODE- 250/637: Performed by: EMERGENCY MEDICINE

## 2022-01-30 PROCEDURE — 80069 RENAL FUNCTION PANEL: CPT

## 2022-01-30 PROCEDURE — 74011636637 HC RX REV CODE- 636/637: Performed by: INTERNAL MEDICINE

## 2022-01-30 PROCEDURE — 74011250637 HC RX REV CODE- 250/637: Performed by: FAMILY MEDICINE

## 2022-01-30 RX ADMIN — RIVAROXABAN 15 MG: 15 TABLET, FILM COATED ORAL at 08:13

## 2022-01-30 RX ADMIN — SODIUM CHLORIDE, PRESERVATIVE FREE 10 ML: 5 INJECTION INTRAVENOUS at 08:14

## 2022-01-30 RX ADMIN — Medication 5 MG: at 22:41

## 2022-01-30 RX ADMIN — FAMOTIDINE 20 MG: 20 TABLET ORAL at 08:13

## 2022-01-30 RX ADMIN — MIRTAZAPINE 7.5 MG: 15 TABLET, FILM COATED ORAL at 22:40

## 2022-01-30 RX ADMIN — IPRATROPIUM BROMIDE AND ALBUTEROL 1 PUFF: 20; 100 SPRAY, METERED RESPIRATORY (INHALATION) at 12:59

## 2022-01-30 RX ADMIN — IPRATROPIUM BROMIDE AND ALBUTEROL 1 PUFF: 20; 100 SPRAY, METERED RESPIRATORY (INHALATION) at 07:41

## 2022-01-30 RX ADMIN — Medication 5000 UNITS: at 08:13

## 2022-01-30 RX ADMIN — METOPROLOL TARTRATE 12.5 MG: 25 TABLET, FILM COATED ORAL at 08:13

## 2022-01-30 RX ADMIN — Medication 6 UNITS: at 11:11

## 2022-01-30 RX ADMIN — SODIUM CHLORIDE, PRESERVATIVE FREE 10 ML: 5 INJECTION INTRAVENOUS at 13:06

## 2022-01-30 RX ADMIN — IPRATROPIUM BROMIDE AND ALBUTEROL 1 PUFF: 20; 100 SPRAY, METERED RESPIRATORY (INHALATION) at 15:16

## 2022-01-30 RX ADMIN — SODIUM CHLORIDE, PRESERVATIVE FREE 10 ML: 5 INJECTION INTRAVENOUS at 22:41

## 2022-01-30 RX ADMIN — METOPROLOL TARTRATE 12.5 MG: 25 TABLET, FILM COATED ORAL at 22:41

## 2022-01-30 RX ADMIN — IPRATROPIUM BROMIDE AND ALBUTEROL 1 PUFF: 20; 100 SPRAY, METERED RESPIRATORY (INHALATION) at 20:30

## 2022-01-30 RX ADMIN — Medication 3 UNITS: at 22:40

## 2022-01-30 RX ADMIN — RIVAROXABAN 15 MG: 15 TABLET, FILM COATED ORAL at 17:38
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
## 2022-01-30 NOTE — ROUTINE PROCESS
Bedside and Verbal shift change report given to [address] (oncoming nurse) by Reginald Meade RN (offgoing nurse). Report included the following information SBAR, Kardex, Intake/Output, MAR and Recent Results.

## 2022-01-30 NOTE — PROGRESS NOTES
Problem: Airway Clearance - Ineffective  Goal: Achieve or maintain patent airway  Outcome: Progressing Towards Goal     Problem: Gas Exchange - Impaired  Goal: Absence of hypoxia  Outcome: Progressing Towards Goal     Problem: Breathing Pattern - Ineffective  Goal: Ability to achieve and maintain a regular respiratory rate  Outcome: Progressing Towards Goal     Problem: Body Temperature -  Risk of, Imbalanced  Goal: Ability to maintain a body temperature within defined limits  Outcome: Progressing Towards Goal     Problem: Isolation Precautions - Risk of Spread of Infection  Goal: Prevent transmission of infectious organism to others  Outcome: Progressing Towards Goal     Problem: Risk for Fluid Volume Deficit  Goal: Maintain normal heart rhythm  Outcome: Progressing Towards Goal     Problem: Pressure Injury - Risk of  Goal: *Prevention of pressure injury  Description: Document Dario Scale and appropriate interventions in the flowsheet. Outcome: Progressing Towards Goal  Note: Pressure Injury Interventions:  Sensory Interventions: Keep linens dry and wrinkle-free,Maintain/enhance activity level,Minimize linen layers,Assess changes in LOC    Moisture Interventions: Check for incontinence Q2 hours and as needed,Absorbent underpads    Activity Interventions: Pressure redistribution bed/mattress(bed type)    Mobility Interventions: HOB 30 degrees or less,Pressure redistribution bed/mattress (bed type)    Nutrition Interventions: Document food/fluid/supplement intake    Friction and Shear Interventions: HOB 30 degrees or less       Problem: Falls - Risk of  Goal: *Absence of Falls  Description: Document Mariel Fall Risk and appropriate interventions in the flowsheet.   Outcome: Progressing Towards Goal  Note: Fall Risk Interventions:  Mobility Interventions: Bed/chair exit alarm,Patient to call before getting OOB,Assess mobility with egress test    Mentation Interventions: Bed/chair exit alarm,Adequate sleep, hydration, pain control    Medication Interventions: Bed/chair exit alarm,Teach patient to arise slowly,Patient to call before getting OOB    Elimination Interventions: Call light in reach,Bed/chair exit alarm,Patient to call for help with toileting needs,Toileting schedule/hourly rounds       Problem: Pain  Goal: *Control of Pain  Outcome: Progressing Towards Goal

## 2022-01-31 LAB
ALBUMIN SERPL-MCNC: 2.1 G/DL (ref 3.4–5)
ANION GAP SERPL CALC-SCNC: 4 MMOL/L (ref 3–18)
BASOPHILS # BLD: 0 K/UL (ref 0–0.1)
BASOPHILS NFR BLD: 0 % (ref 0–2)
BUN SERPL-MCNC: 22 MG/DL (ref 7–18)
BUN/CREAT SERPL: 17 (ref 12–20)
CALCIUM SERPL-MCNC: 8.7 MG/DL (ref 8.5–10.1)
CHLORIDE SERPL-SCNC: 106 MMOL/L (ref 100–111)
CO2 SERPL-SCNC: 26 MMOL/L (ref 21–32)
CREAT SERPL-MCNC: 1.29 MG/DL (ref 0.6–1.3)
DIFFERENTIAL METHOD BLD: ABNORMAL
EOSINOPHIL # BLD: 0.5 K/UL (ref 0–0.4)
EOSINOPHIL NFR BLD: 6 % (ref 0–5)
ERYTHROCYTE [DISTWIDTH] IN BLOOD BY AUTOMATED COUNT: 13.9 % (ref 11.6–14.5)
GLUCOSE BLD STRIP.AUTO-MCNC: 107 MG/DL (ref 70–110)
GLUCOSE BLD STRIP.AUTO-MCNC: 116 MG/DL (ref 70–110)
GLUCOSE BLD STRIP.AUTO-MCNC: 123 MG/DL (ref 70–110)
GLUCOSE BLD STRIP.AUTO-MCNC: 227 MG/DL (ref 70–110)
GLUCOSE SERPL-MCNC: 100 MG/DL (ref 74–99)
HCT VFR BLD AUTO: 35.5 % (ref 36–48)
HGB BLD-MCNC: 11.9 G/DL (ref 13–16)
IMM GRANULOCYTES # BLD AUTO: 0.1 K/UL (ref 0–0.04)
IMM GRANULOCYTES NFR BLD AUTO: 1 % (ref 0–0.5)
LYMPHOCYTES # BLD: 1.6 K/UL (ref 0.9–3.6)
LYMPHOCYTES NFR BLD: 22 % (ref 21–52)
MAGNESIUM SERPL-MCNC: 2 MG/DL (ref 1.6–2.6)
MCH RBC QN AUTO: 29.2 PG (ref 24–34)
MCHC RBC AUTO-ENTMCNC: 33.5 G/DL (ref 31–37)
MCV RBC AUTO: 87.2 FL (ref 78–100)
MONOCYTES # BLD: 0.5 K/UL (ref 0.05–1.2)
MONOCYTES NFR BLD: 7 % (ref 3–10)
NEUTS SEG # BLD: 4.5 K/UL (ref 1.8–8)
NEUTS SEG NFR BLD: 63 % (ref 40–73)
NRBC # BLD: 0 K/UL (ref 0–0.01)
NRBC BLD-RTO: 0 PER 100 WBC
PHOSPHATE SERPL-MCNC: 3.3 MG/DL (ref 2.5–4.9)
PLATELET # BLD AUTO: 129 K/UL (ref 135–420)
PMV BLD AUTO: 12.2 FL (ref 9.2–11.8)
POTASSIUM SERPL-SCNC: 4.7 MMOL/L (ref 3.5–5.5)
RBC # BLD AUTO: 4.07 M/UL (ref 4.35–5.65)
SODIUM SERPL-SCNC: 136 MMOL/L (ref 136–145)
WBC # BLD AUTO: 7 K/UL (ref 4.6–13.2)

## 2022-01-31 PROCEDURE — 97164 PT RE-EVAL EST PLAN CARE: CPT

## 2022-01-31 PROCEDURE — 74011250637 HC RX REV CODE- 250/637: Performed by: INTERNAL MEDICINE

## 2022-01-31 PROCEDURE — 77010033678 HC OXYGEN DAILY

## 2022-01-31 PROCEDURE — 94760 N-INVAS EAR/PLS OXIMETRY 1: CPT

## 2022-01-31 PROCEDURE — 74011250637 HC RX REV CODE- 250/637: Performed by: EMERGENCY MEDICINE

## 2022-01-31 PROCEDURE — 83735 ASSAY OF MAGNESIUM: CPT

## 2022-01-31 PROCEDURE — 74011000250 HC RX REV CODE- 250: Performed by: HOSPITALIST

## 2022-01-31 PROCEDURE — 97530 THERAPEUTIC ACTIVITIES: CPT

## 2022-01-31 PROCEDURE — 2709999900 HC NON-CHARGEABLE SUPPLY

## 2022-01-31 PROCEDURE — 99232 SBSQ HOSP IP/OBS MODERATE 35: CPT | Performed by: INTERNAL MEDICINE

## 2022-01-31 PROCEDURE — 65660000000 HC RM CCU STEPDOWN

## 2022-01-31 PROCEDURE — 74011250637 HC RX REV CODE- 250/637: Performed by: FAMILY MEDICINE

## 2022-01-31 PROCEDURE — 94640 AIRWAY INHALATION TREATMENT: CPT

## 2022-01-31 PROCEDURE — 97535 SELF CARE MNGMENT TRAINING: CPT

## 2022-01-31 PROCEDURE — 74011636637 HC RX REV CODE- 636/637: Performed by: INTERNAL MEDICINE

## 2022-01-31 PROCEDURE — 85025 COMPLETE CBC W/AUTO DIFF WBC: CPT

## 2022-01-31 PROCEDURE — 36415 COLL VENOUS BLD VENIPUNCTURE: CPT

## 2022-01-31 PROCEDURE — 80069 RENAL FUNCTION PANEL: CPT

## 2022-01-31 PROCEDURE — 82962 GLUCOSE BLOOD TEST: CPT

## 2022-01-31 RX ORDER — LANOLIN ALCOHOL/MO/W.PET/CERES
3 CREAM (GRAM) TOPICAL
Status: DISCONTINUED | OUTPATIENT
Start: 2022-01-31 | End: 2022-02-04 | Stop reason: HOSPADM

## 2022-01-31 RX ADMIN — SODIUM CHLORIDE, PRESERVATIVE FREE 10 ML: 5 INJECTION INTRAVENOUS at 21:35

## 2022-01-31 RX ADMIN — IPRATROPIUM BROMIDE AND ALBUTEROL 1 PUFF: 20; 100 SPRAY, METERED RESPIRATORY (INHALATION) at 12:00

## 2022-01-31 RX ADMIN — IPRATROPIUM BROMIDE AND ALBUTEROL 1 PUFF: 20; 100 SPRAY, METERED RESPIRATORY (INHALATION) at 08:00

## 2022-01-31 RX ADMIN — RIVAROXABAN 15 MG: 15 TABLET, FILM COATED ORAL at 17:04

## 2022-01-31 RX ADMIN — METOPROLOL TARTRATE 12.5 MG: 25 TABLET, FILM COATED ORAL at 21:34

## 2022-01-31 RX ADMIN — FAMOTIDINE 20 MG: 20 TABLET ORAL at 09:03

## 2022-01-31 RX ADMIN — Medication 6 UNITS: at 21:34

## 2022-01-31 RX ADMIN — IPRATROPIUM BROMIDE AND ALBUTEROL 1 PUFF: 20; 100 SPRAY, METERED RESPIRATORY (INHALATION) at 16:00

## 2022-01-31 RX ADMIN — MIRTAZAPINE 7.5 MG: 15 TABLET, FILM COATED ORAL at 21:34

## 2022-01-31 RX ADMIN — SODIUM CHLORIDE, PRESERVATIVE FREE 10 ML: 5 INJECTION INTRAVENOUS at 17:05

## 2022-01-31 RX ADMIN — METOPROLOL TARTRATE 12.5 MG: 25 TABLET, FILM COATED ORAL at 09:03

## 2022-01-31 RX ADMIN — RIVAROXABAN 15 MG: 15 TABLET, FILM COATED ORAL at 09:03

## 2022-01-31 RX ADMIN — Medication 3 MG: at 21:34

## 2022-01-31 RX ADMIN — IPRATROPIUM BROMIDE AND ALBUTEROL 1 PUFF: 20; 100 SPRAY, METERED RESPIRATORY (INHALATION) at 21:34

## 2022-01-31 RX ADMIN — Medication 5000 UNITS: at 09:03

## 2022-01-31 NOTE — PROGRESS NOTES
Problem: Self Care Deficits Care Plan (Adult)  Goal: *Acute Goals and Plan of Care (Insert Text)  Description: Occupational Therapy Goals  Re-evaluation 1/25/2022, pt is making slow but steady progress towards goals, pt requires re-direction for good participation d/t emotional lability displayed throughout tx session    1. Patient will perform grooming with supervision/set-up. 2.  Patient will perform bathing with minimal assistance/contact guard assist.  3.  Patient will perform upper body dressing and lower body dressing with minimal assistance/contact guard assist.  4.  Patient will perform bedside commode transfers with minimal assistance/contact guard assist using LRAD. 5.  Patient will perform all aspects of toileting with minimal assistance/contact guard assist.  6.  Patient will participate in upper extremity therapeutic exercise/activities with supervision/set-up for 8 minutes. 7.  Patient will utilize energy conservation techniques during functional activities with min verbal cues. Prior Level of Function: pt demonstrating difficulty providing accurate PLOF e.g. use of AD for functional mobility - pt states he used a walker and also states he does not use a walker. Pt indicated that his spouse assists with shower transfer and bathing tasks while seated on shower seat and spouse also assists with dressing tasks. Outcome: Progressing Towards Goal   OCCUPATIONAL THERAPY TREATMENT    Patient: Lynn Vargas (15 y.o. male)  Date: 1/31/2022  Diagnosis: COVID [U07.1]  Pneumonia due to COVID-19 virus [U07.1, J12.82] <principal problem not specified>       Precautions: Contact,Skin,Other (comment) (droplet plus)    Chart, occupational therapy assessment, plan of care, and goals were reviewed. ASSESSMENT:  Pt co-treated w/PT to maximize safety and pt participation. Supplemental O2 doffed upon entry, SpO2% 87 on RA. Reapplied supplemental O2  @ 2L.  Pt appears brighter today, agreeable to EOB activity. Pt demonstrates improved independence w/bed mobility today and tolerates ~ 15 minutes sitting EOB. Pt performs ADL grooming task w/increase time and assist for thoroughness. Pt w/non-productive cough and epistaxis. Pt tolerates 1 trail standing w/2 person assist and RW for support. Pt c/o SOB and diaphoretic. SpO2% 60, increase O2 10L and alerted NSGIsis, immediately. Pt returned to supine and Donned NRB @ 12L. SpO2% improving w/deep breathing. Progression toward goals:  []          Improving appropriately and progressing toward goals  [x]          Improving slowly and progressing toward goals  []          Not making progress toward goals and plan of care will be adjusted     PLAN:  Patient continues to benefit from skilled intervention to address the above impairments. Continue treatment per established plan of care. Discharge Recommendations: To Be Determined as pt progresses  Further Equipment Recommendations for Discharge:  TBD as pt progresses     SUBJECTIVE:   Patient stated [employer].  reference football team    OBJECTIVE DATA SUMMARY:   Cognitive/Behavioral Status:  Neurologic State: Alert  Orientation Level: Oriented to person,Oriented to place  Cognition: Follows commands  Safety/Judgement: Fall prevention    Functional Mobility and Transfers for ADLs:   Bed Mobility:  Supine to Sit: Additional time;Minimum assistance  Sit to Supine: Moderate assistance;Assist x2     Transfers:  Sit to Stand: Contact guard assistance;Assist x2 (w/RW)    Balance:  Sitting: Intact  Standing: Impaired; With support  Standing - Static: Fair    ADL Intervention:  Grooming  Position Performed: Seated edge of bed  Washing Face: Minimum assistance (assist for thoroughness)  Brush teeth: SBA    Neuro Re-Education:  Sitting EOB ~ 15 minutes    Pain:  Pain level pre-treatment: 0/10   Pain level post-treatment: 0/10    Activity Tolerance:    Fair    Please refer to the flowsheet for vital signs taken during this treatment. After treatment:   []  Patient left in no apparent distress sitting up in chair  [x]  Patient left in no apparent distress in bed  [x]  Call bell left within reach  []  Nursing notified  []  Caregiver present  []  Bed alarm activated    COMMUNICATION/EDUCATION:   [] Role of Occupational Therapy in the acute care setting  [] Home safety education was provided and the patient/caregiver indicated understanding. [] Patient/family have participated as able in working towards goals and plan of care. [x] Patient/family agree to work toward stated goals and plan of care. [x] Patient understands intent and goals of therapy, but is neutral about his/her participation. [] Patient is unable to participate in goal setting and plan of care.       Thank you for this referral.  LIZ Monte  Time Calculation: 30 mins

## 2022-01-31 NOTE — PROGRESS NOTES
Problem: Mobility Impaired (Adult and Pediatric)  Goal: *Acute Goals and Plan of Care (Insert Text)  Description: Physical Therapy Goals  Initiated 1/18/2022, re-evaluated 1/25/22 and goals adjusted as needed and to be accomplished within 7 day(s), re-evaluated 1/31/22  1. Patient will move from supine to sit and sit to supine , scoot up and down, and roll side to side in bed with supervision. 2.  Patient will transfer from bed to chair and chair to bed with CGA using the least restrictive device. 3.  Patient will perform sit to stand with CGA  4. Patient will ambulate with Sheyla for 20 feet with the least restrictive device. 5.  Patient will ascend/descend 4 stairs with unilateral handrail(s) with minimal assistance/contact guard assist.    PLOF: Pt reporting he ambulates without AD, assist sometimes for bed mobility. Lives with wife in 1 story house with 4 YURIY. Outcome: Progressing Towards Goal     PHYSICAL THERAPY RE-EVALUATION    Patient: Hiral Gaviria (72 y.o. male)  Date: 1/31/2022  Primary Diagnosis: COVID [U07.1]  Pneumonia due to COVID-19 virus [U07.1, J12.82]        Precautions:   Contact,Skin,Other (comment) (droplet plus)  PLOF: see above     ASSESSMENT :  Based on the objective data described below, the patient presents with generalized weakness (R>L), decreased activity tolerance, impaired balance and gait, decreased functional mobility from baseline. Pt seen with OT to maximize functional mobility and safety Pt noted to not be wearing oxygen O2 upon entry, SpO2% 87 on RA. Reapplied supplemental O2  @ 2L and increased to greater than 90% with education in PLB. Pt is able to sit EOB for grooming task, increased coughing noted followed by nose bleeding noted. Pt briefly stood with CGA x 2 for approx 10 second and then returned to sitting. Pt c/o SOB and diaphoretic. SpO2% 60, increase O2 10L and alerted Isis JONES immediately. Pt returned to supine and Donned NRB @ 12L.  SpO2% improving w/deep breathing. Pt continues to be limited with mobility due to desaturations with minimal exertions although level of assists are improving. Patient will benefit from skilled intervention to address the above impairments. Patient's rehabilitation potential is considered to be Good  Factors which may influence rehabilitation potential include:     []         None noted  []         Mental ability/status  [x]         Medical condition  []         Home/family situation and support systems  []         Safety awareness  []         Pain tolerance/management  []         Other:      PLAN :  Recommendations and Planned Interventions:   [x]           Bed Mobility Training             [x]    Neuromuscular Re-Education  [x]           Transfer Training                   []    Orthotic/Prosthetic Training  [x]           Gait Training                          []    Modalities  [x]           Therapeutic Exercises           []    Edema Management/Control  [x]           Therapeutic Activities            [x]    Family Training/Education  [x]           Patient Education  []           Other (comment):    Frequency/Duration: Patient will be followed by physical therapy 1-2 times per day/3-5 days per week to address goals. Discharge Recommendations: Home Health vs rehab,   Further Equipment Recommendations for Discharge: rolling walker and wheelchair for energy conservation      SUBJECTIVE:   Patient stated I am feeling better.     OBJECTIVE DATA SUMMARY:   Hospital course since last seen and reason for re-evaluation: slow progress towards goals, continues to de-sat to 60-70% with minimal exertion on 3L NC  Past Medical History:   Diagnosis Date    CVA (cerebral vascular accident) ([address]     right-sided deficit    Diabetes ([address]     HTN (hypertension)      Past Surgical History:   Procedure Laterality Date    HX HIP FRACTURE TX      left    HX ORTHOPAEDIC      hip     Barriers to Learning/Limitations: yes;  physical  Compensate with: Visual Cues, Verbal Cues, and Tactile Cues  Home Situation:   Home Situation  Home Environment: Private residence  # Steps to Enter: 4  Rails to Enter: Yes  Wheelchair Ramp: No  One/Two Story Residence: One story  Living Alone: No  Support Systems: Spouse/Significant Other  Patient Expects to be Discharged to[de-identified] Rehab Unit Subacute  Current DME Used/Available at Home: [employer] chair  Tub or Shower Type: Shower  Critical Behavior:  Neurologic State: Alert  Orientation Level: Oriented to person;Oriented to place  Cognition: Follows commands     Psychosocial  Patient Behaviors: Calm  Needs Expressed: Educational  Purposeful Interaction: Yes  Pt Identified Daily Priority: Clinical issues (comment)  Caritas Process: Nurture loving kindness  Caring Interventions: Reassure  Reassure: Therapeutic listening  Therapeutic Modalities: Intentional therapeutic touch;Humor  Other Caring Modalities: hourly rounds                 Strength:    Strength: Generally decreased, functional       Tone & Sensation:   Tone: Normal       Sensation: Intact    Range Of Motion:  AROM: Within functional limits       Functional Mobility:  Bed Mobility:     Supine to Sit: Minimum assistance; Additional time  Sit to Supine: Moderate assistance;Assist x2     Transfers:  Sit to Stand: Contact guard assistance;Assist x2  Stand to Sit: Contact guard assistance;Assist x2       Balance:   Sitting: Intact  Standing: Impaired; With support  Standing - Static: Fair    Pain:  Pain level pre-treatment: 0/10   Pain level post-treatment: 0/10   Pain Intervention(s) : Medication (see MAR); Rest, Ice, Repositioning   Response to intervention: Nurse notified    Activity Tolerance:   Good    Please refer to the flowsheet for vital signs taken during this treatment.   After treatment:   []         Patient left in no apparent distress sitting up in chair  [x]         Patient left in no apparent distress in bed  [x]         Call bell left within reach  [x] Nursing notified  []         Caregiver present  [x]         Bed alarm activated  []         SCDs applied    COMMUNICATION/EDUCATION:   [x]         Role of Physical Therapy in the acute care setting. [x]         Fall prevention education was provided and the patient/caregiver indicated understanding. [x]         Patient/family have participated as able in goal setting and plan of care. [x]         Patient/family agree to work toward stated goals and plan of care. []         Patient understands intent and goals of therapy, but is neutral about his/her participation. []         Patient is unable to participate in goal setting/plan of care: ongoing with therapy staff.  []         Other:     Thank you for this referral.  Kenrick Flores   Time Calculation: 29 mins

## 2022-01-31 NOTE — PROGRESS NOTES
Patient has been declined by [facility]. Spoke with him and he would prefer to go home with home health instead of a facility. Therapy reports that he still desats quickly when up and moving.     Aidee Bowles RN

## 2022-01-31 NOTE — PROGRESS NOTES
[facility]  [facility]    Patient: Lisa Seth Age: 46 y.o. : 1970 MR#: [id number] SSN: [id number]  Date/Time: 2022     Subjective:   Patient has some exertional SOB otherwise no complaints    Review of systems  General: No fevers or chills. Cardiovascular: No chest pain or pressure. No palpitations. Pulmonary: No shortness of breath, cough or wheeze. Gastrointestinal: No abdominal pain, nausea, vomiting or diarrhea. Genitourinary: No urinary frequency, urgency, hesitancy or dysuria. Musculoskeletal: No joint or muscle pain, no back pain, no recent trauma. Neurologic: No headache, numbness, tingling or weakness. Assessment/Plan:   47 y/o male with COVID PNA complicated by DVT and MONICA overall with improvement    #Acute hypoxic respiratory failure:2/2 COVID PNA on 2-3L NC, completed steroid course, will most likely need to be d/c with O2. Patient not a candidate for ARU, plan to d/c home with home health and O2  -wean O2 as tolerated  -ICS      #DVT: Provoked from COVID, on riveraxaban, will need to continue for minimum of 3 months  -continue xarelto    #Normocytic Anemia:Chronic, follow up outpt    #Thrombocytopenia: Present on admission, but now worsened, most likely from COVID infection, did get heparin, 4T score 3, low probability, continue to monitor. #MONICA:Resolved    #T2DM: Patient with low blood sugars <100, recommend decreasing latus.  -Diabetes management following, appreciate recs          I spent 60 minutes with the patient in face-to-face consultation, of which greater than 50% was spent in counseling and coordination of care as described above.     Case discussed with:  [x]Patient  []Family  []Nursing  []Case Management  DVT Prophylaxis:  []Lovenox  []Hep SQ  []SCDs  []Coumadin   [x]Eliquis/Xarelto     Objective:   VS:   Visit Vitals  /80   Pulse 78   Temp 97.9 °F (36.6 °C)   Resp 19   Ht 5' 11\" (1.803 m)   Wt 92.1 kg (203 lb)   SpO2 95%   BMI 28.31 kg/m²      Tmax/24hrs: Temp (24hrs), Av.9 °F (36.6 °C), Min:97.4 °F (36.3 °C), Max:98.5 °F (36.9 °C)  IOBRIEFNo intake or output data in the 24 hours ending 22 0927    General:  Alert, cooperative, no acute distress    HEENT: PERRLA, anicteric sclerae. Pulmonary:  Coarse breath sounds b/l  Cardiovascular: Regular rate and Rhythm. GI:  Soft, Non distended, Non tender. + Bowel sounds. Extremities:  No edema. No calf tenderness. Psych: Good insight. Not anxious or agitated. Neurologic: Alert and oriented X 3. Moves all ext.   Additional:    Medications:   Current Facility-Administered Medications   Medication Dose Route Frequency    ipratropium-albuterol (COMBIVENT RESPIMAT) 20 mcg-100 mcg inhalation spray  1 Puff Inhalation QID RT    mirtazapine (REMERON) tablet 7.5 mg  7.5 mg Oral QHS    metoprolol tartrate (LOPRESSOR) tablet 12.5 mg  12.5 mg Oral Q12H    dextrose 10% infusion 125-250 mL  125-250 mL IntraVENous PRN    hydrALAZINE (APRESOLINE) 20 mg/mL injection 10 mg  10 mg IntraVENous Q6H PRN    [Held by provider] amLODIPine (NORVASC) tablet 10 mg  10 mg Oral DAILY    rivaroxaban (XARELTO) tablet 15 mg  15 mg Oral BID WITH MEALS    sodium chloride (NS) flush 5-40 mL  5-40 mL IntraVENous Q8H    sodium chloride (NS) flush 5-40 mL  5-40 mL IntraVENous PRN    acetaminophen (TYLENOL) tablet 650 mg  650 mg Oral Q6H PRN    Or    acetaminophen (TYLENOL) suppository 650 mg  650 mg Rectal Q6H PRN    polyethylene glycol (MIRALAX) packet 17 g  17 g Oral DAILY PRN    ondansetron (ZOFRAN ODT) tablet 4 mg  4 mg Oral Q8H PRN    Or    ondansetron (ZOFRAN) injection 4 mg  4 mg IntraVENous Q6H PRN    insulin lispro (HUMALOG) injection   SubCUTAneous AC&HS    glucose chewable tablet 16 g  4 Tablet Oral PRN    glucagon (GLUCAGEN) injection 1 mg  1 mg IntraMUSCular PRN    famotidine (PEPCID) tablet 20 mg  20 mg Oral DAILY    melatonin tablet 5 mg  5 mg Oral QHS    cholecalciferol (VITAMIN D3) capsule 5,000 Units  5,000 Units Oral DAILY       Labs:    Recent Results (from the past 24 hour(s))   GLUCOSE, POC    Collection Time: 01/30/22 11:09 AM   Result Value Ref Range    Glucose (POC) 218 (H) 70 - 110 mg/dL   CBC W/O DIFF    Collection Time: 01/30/22  3:37 PM   Result Value Ref Range    WBC 7.6 4.6 - 13.2 K/uL    RBC 4.22 (L) 4.35 - 5.65 M/uL    HGB 11.8 (L) 13.0 - 16.0 g/dL    HCT 36.5 36.0 - 48.0 %    MCV 86.5 78.0 - 100.0 FL    MCH 28.0 24.0 - 34.0 PG    MCHC 32.3 31.0 - 37.0 g/dL    RDW 13.9 11.6 - 14.5 %    PLATELET 744 (L) 178 - 420 K/uL    MPV 11.7 9.2 - 11.8 FL    NRBC 0.0 0  WBC    ABSOLUTE NRBC 0.00 0.00 - 0.01 K/uL   GLUCOSE, POC    Collection Time: 01/30/22  4:36 PM   Result Value Ref Range    Glucose (POC) 122 (H) 70 - 110 mg/dL   GLUCOSE, POC    Collection Time: 01/30/22  9:05 PM   Result Value Ref Range    Glucose (POC) 194 (H) 70 - 110 mg/dL   RENAL FUNCTION PANEL    Collection Time: 01/31/22 12:40 AM   Result Value Ref Range    Sodium 136 136 - 145 mmol/L    Potassium 4.7 3.5 - 5.5 mmol/L    Chloride 106 100 - 111 mmol/L    CO2 26 21 - 32 mmol/L    Anion gap 4 3.0 - 18 mmol/L    Glucose 100 (H) 74 - 99 mg/dL    BUN 22 (H) 7.0 - 18 MG/DL    Creatinine 1.29 0.6 - 1.3 MG/DL    BUN/Creatinine ratio 17 12 - 20      GFR est AA >60 >60 ml/min/1.73m2    GFR est non-AA 59 (L) >60 ml/min/1.73m2    Calcium 8.7 8.5 - 10.1 MG/DL    Phosphorus 3.3 2.5 - 4.9 MG/DL    Albumin 2.1 (L) 3.4 - 5.0 g/dL   CBC WITH AUTOMATED DIFF    Collection Time: 01/31/22 12:40 AM   Result Value Ref Range    WBC 7.0 4.6 - 13.2 K/uL    RBC 4.07 (L) 4.35 - 5.65 M/uL    HGB 11.9 (L) 13.0 - 16.0 g/dL    HCT 35.5 (L) 36.0 - 48.0 %    MCV 87.2 78.0 - 100.0 FL    MCH 29.2 24.0 - 34.0 PG    MCHC 33.5 31.0 - 37.0 g/dL    RDW 13.9 11.6 - 14.5 %    PLATELET 180 (L) 539 - 420 K/uL    MPV 12.2 (H) 9.2 - 11.8 FL    NRBC 0.0 0  WBC    ABSOLUTE NRBC 0.00 0.00 - 0.01 K/uL    NEUTROPHILS 63 40 - 73 %    LYMPHOCYTES 22 21 - 52 %    MONOCYTES 7 3 - 10 %    EOSINOPHILS 6 (H) 0 - 5 %    BASOPHILS 0 0 - 2 %    IMMATURE GRANULOCYTES 1 (H) 0.0 - 0.5 %    ABS. NEUTROPHILS 4.5 1.8 - 8.0 K/UL    ABS. LYMPHOCYTES 1.6 0.9 - 3.6 K/UL    ABS. MONOCYTES 0.5 0.05 - 1.2 K/UL    ABS. EOSINOPHILS 0.5 (H) 0.0 - 0.4 K/UL    ABS. BASOPHILS 0.0 0.0 - 0.1 K/UL    ABS. IMM. GRANS. 0.1 (H) 0.00 - 0.04 K/UL    DF AUTOMATED     MAGNESIUM    Collection Time: 01/31/22 12:40 AM   Result Value Ref Range    Magnesium 2.0 1.6 - 2.6 mg/dL   GLUCOSE, POC    Collection Time: 01/31/22  6:49 AM   Result Value Ref Range    Glucose (POC) 116 (H) 70 - 110 mg/dL       Signed By: Feliciano Nix DO     January 31, 2022      Disclaimer: Sections of this note are dictated using utilizing voice recognition software. Minor typographical errors may be present. If questions arise, please do not hesitate to contact me or call our department.

## 2022-01-31 NOTE — PROGRESS NOTES
Following peripherally   Renal functions have improved   MONICA on CKD   Follow electrolytes and renal functions daily   Wean O2 as tolerated  Following peripherally    Please call with questions    Mainor Merritt MD FASN  Cell [phone]  Pager: [phone]

## 2022-01-31 NOTE — PROGRESS NOTES
Hospitalist Progress Note    Patient: Sneha Orr Age: 46 y.o. : 1970 MR#: [id number] SSN: [id number]  Date/Time: 2022     DOA: 2022  PCP: Hailey Napier MD    Subjective:     Patient state he is fine. Denies any chest pain abdominal pain pain no nausea vomiting. He stated he does not want to go to rehab and would like to go home. He stated his wife usually takes care of him. Assessment/Plan:     1. Acute respiratory failure with hypoxia due to COVID-19 infection      2. COVID-19 pneumonia  3. MONICA on CKD3, associate with covid infection, resolving  4. Elevated troponin, demand ischemia,   5. Hyperglycemia with T2DM, HgbA1c 7.8%  6. Acute DVT on legs, associate hypercoagulable state due to covid  -was on heparin gtt, now tolerated [address]   7. H/o stroke with right-sided hemiparesis   8. . Stage 2 pressure ulcer of the gluteal cleft, POA   9. Hypomagnesemia, better    PLAN:    Patient is currently on 3 L oxygen. Continue weaning patient off oxygen if possible. Advised nursing to evaluate patient for home need for oxygen. Discussed with  about it. Continue incentive spirometry, Combivent and bronchial hygiene. Continuing Xarelto and monitor for bleeding  Avoid nephrotoxic meds, nephrology is following  PT and OT are recommending rehab    Patient is medically stable to transition to rehab/SNF. Discussed with  about it. She will reach out to patient's wife to see she can take care of him at home. Case discussed with:  [x]Patient  []Family  [x]Nursing  [x]Case Management  DVT Prophylaxis:  []Lovenox  [x]Xarelto  []SCDs  []Coumadin   []On Heparin gtt    Total time to take care of this patient was 30 minutes and more than 50% of time was spent counseling and coordinating care.        Objective:   VS:   Visit Vitals  /71   Pulse 69   Temp 98.1 °F (36.7 °C)   Resp 20   Ht 5' 11\" (1.803 m)   Wt 92.1 kg (203 lb)   SpO2 94%   BMI 28.31 kg/m² Tmax/24hrs: Temp (24hrs), Av °F (36.7 °C), Min:97.4 °F (36.3 °C), Max:98.5 °F (36.9 °C)    No intake or output data in the 24 hours ending 22 1612    General:  Awake, follows commands, not in acute distress  Cardiovascular:  S1S2+, RRR  Pulmonary:  CTA b/l, no wheezes  GI:  Soft, BS+, NT, ND  Extremities:  No edema  CNS: Moves left side very well.   Right-sided weakness      Current Facility-Administered Medications   Medication Dose Route Frequency    ipratropium-albuterol (COMBIVENT RESPIMAT) 20 mcg-100 mcg inhalation spray  1 Puff Inhalation QID RT    mirtazapine (REMERON) tablet 7.5 mg  7.5 mg Oral QHS    metoprolol tartrate (LOPRESSOR) tablet 12.5 mg  12.5 mg Oral Q12H    dextrose 10% infusion 125-250 mL  125-250 mL IntraVENous PRN    hydrALAZINE (APRESOLINE) 20 mg/mL injection 10 mg  10 mg IntraVENous Q6H PRN    [Held by provider] amLODIPine (NORVASC) tablet 10 mg  10 mg Oral DAILY    rivaroxaban (XARELTO) tablet 15 mg  15 mg Oral BID WITH MEALS    sodium chloride (NS) flush 5-40 mL  5-40 mL IntraVENous Q8H    sodium chloride (NS) flush 5-40 mL  5-40 mL IntraVENous PRN    acetaminophen (TYLENOL) tablet 650 mg  650 mg Oral Q6H PRN    Or    acetaminophen (TYLENOL) suppository 650 mg  650 mg Rectal Q6H PRN    polyethylene glycol (MIRALAX) packet 17 g  17 g Oral DAILY PRN    ondansetron (ZOFRAN ODT) tablet 4 mg  4 mg Oral Q8H PRN    Or    ondansetron (ZOFRAN) injection 4 mg  4 mg IntraVENous Q6H PRN    insulin lispro (HUMALOG) injection   SubCUTAneous AC&HS    glucose chewable tablet 16 g  4 Tablet Oral PRN    glucagon (GLUCAGEN) injection 1 mg  1 mg IntraMUSCular PRN    famotidine (PEPCID) tablet 20 mg  20 mg Oral DAILY    melatonin tablet 5 mg  5 mg Oral QHS    cholecalciferol (VITAMIN D3) capsule 5,000 Units  5,000 Units Oral DAILY        Recent Results (from the past 24 hour(s))   GLUCOSE, POC    Collection Time: 22  4:36 PM   Result Value Ref Range    Glucose (POC) 122 (H) 70 - 110 mg/dL   GLUCOSE, POC    Collection Time: 01/30/22  9:05 PM   Result Value Ref Range    Glucose (POC) 194 (H) 70 - 110 mg/dL   RENAL FUNCTION PANEL    Collection Time: 01/31/22 12:40 AM   Result Value Ref Range    Sodium 136 136 - 145 mmol/L    Potassium 4.7 3.5 - 5.5 mmol/L    Chloride 106 100 - 111 mmol/L    CO2 26 21 - 32 mmol/L    Anion gap 4 3.0 - 18 mmol/L    Glucose 100 (H) 74 - 99 mg/dL    BUN 22 (H) 7.0 - 18 MG/DL    Creatinine 1.29 0.6 - 1.3 MG/DL    BUN/Creatinine ratio 17 12 - 20      GFR est AA >60 >60 ml/min/1.73m2    GFR est non-AA 59 (L) >60 ml/min/1.73m2    Calcium 8.7 8.5 - 10.1 MG/DL    Phosphorus 3.3 2.5 - 4.9 MG/DL    Albumin 2.1 (L) 3.4 - 5.0 g/dL   CBC WITH AUTOMATED DIFF    Collection Time: 01/31/22 12:40 AM   Result Value Ref Range    WBC 7.0 4.6 - 13.2 K/uL    RBC 4.07 (L) 4.35 - 5.65 M/uL    HGB 11.9 (L) 13.0 - 16.0 g/dL    HCT 35.5 (L) 36.0 - 48.0 %    MCV 87.2 78.0 - 100.0 FL    MCH 29.2 24.0 - 34.0 PG    MCHC 33.5 31.0 - 37.0 g/dL    RDW 13.9 11.6 - 14.5 %    PLATELET 713 (L) 833 - 420 K/uL    MPV 12.2 (H) 9.2 - 11.8 FL    NRBC 0.0 0  WBC    ABSOLUTE NRBC 0.00 0.00 - 0.01 K/uL    NEUTROPHILS 63 40 - 73 %    LYMPHOCYTES 22 21 - 52 %    MONOCYTES 7 3 - 10 %    EOSINOPHILS 6 (H) 0 - 5 %    BASOPHILS 0 0 - 2 %    IMMATURE GRANULOCYTES 1 (H) 0.0 - 0.5 %    ABS. NEUTROPHILS 4.5 1.8 - 8.0 K/UL    ABS. LYMPHOCYTES 1.6 0.9 - 3.6 K/UL    ABS. MONOCYTES 0.5 0.05 - 1.2 K/UL    ABS. EOSINOPHILS 0.5 (H) 0.0 - 0.4 K/UL    ABS. BASOPHILS 0.0 0.0 - 0.1 K/UL    ABS. IMM.  GRANS. 0.1 (H) 0.00 - 0.04 K/UL    DF AUTOMATED     MAGNESIUM    Collection Time: 01/31/22 12:40 AM   Result Value Ref Range    Magnesium 2.0 1.6 - 2.6 mg/dL   GLUCOSE, POC    Collection Time: 01/31/22  6:49 AM   Result Value Ref Range    Glucose (POC) 116 (H) 70 - 110 mg/dL   GLUCOSE, POC    Collection Time: 01/31/22 12:45 PM   Result Value Ref Range    Glucose (POC) 123 (H) 70 - 110 mg/dL     Disclaimer: Sections of this note are dictated using utilizing voice recognition software, which may have resulted in some phonetic based errors in grammar and contents. Even though attempts were made to correct all the mistakes, some may have been missed, and remained in the body of the document. If questions arise, please contact our department.

## 2022-01-31 NOTE — PROGRESS NOTES
Bedside shift change report given to Adi Adame (oncoming nurse) by Dolores Gutierres (offgoing nurse). Report included the following information SBAR, Intake/Output, MAR and Recent Results.

## 2022-01-31 NOTE — ROUTINE PROCESS
Bedside and Verbal shift change report given to [address] (oncoming nurse) by Gary Morris RN (offgoing nurse). Report included the following information SBAR, Kardex, Intake/Output, MAR and Recent Results.

## 2022-02-01 LAB
ALBUMIN SERPL-MCNC: 2.1 G/DL (ref 3.4–5)
ANION GAP SERPL CALC-SCNC: 4 MMOL/L (ref 3–18)
BUN SERPL-MCNC: 20 MG/DL (ref 7–18)
BUN/CREAT SERPL: 15 (ref 12–20)
CALCIUM SERPL-MCNC: 8.4 MG/DL (ref 8.5–10.1)
CHLORIDE SERPL-SCNC: 106 MMOL/L (ref 100–111)
CO2 SERPL-SCNC: 27 MMOL/L (ref 21–32)
CREAT SERPL-MCNC: 1.34 MG/DL (ref 0.6–1.3)
GLUCOSE BLD STRIP.AUTO-MCNC: 136 MG/DL (ref 70–110)
GLUCOSE BLD STRIP.AUTO-MCNC: 179 MG/DL (ref 70–110)
GLUCOSE BLD STRIP.AUTO-MCNC: 189 MG/DL (ref 70–110)
GLUCOSE BLD STRIP.AUTO-MCNC: 276 MG/DL (ref 70–110)
GLUCOSE SERPL-MCNC: 135 MG/DL (ref 74–99)
PHOSPHATE SERPL-MCNC: 3.6 MG/DL (ref 2.5–4.9)
POTASSIUM SERPL-SCNC: 4.2 MMOL/L (ref 3.5–5.5)
SODIUM SERPL-SCNC: 137 MMOL/L (ref 136–145)

## 2022-02-01 PROCEDURE — 82962 GLUCOSE BLOOD TEST: CPT

## 2022-02-01 PROCEDURE — 36415 COLL VENOUS BLD VENIPUNCTURE: CPT

## 2022-02-01 PROCEDURE — 97168 OT RE-EVAL EST PLAN CARE: CPT

## 2022-02-01 PROCEDURE — 97530 THERAPEUTIC ACTIVITIES: CPT

## 2022-02-01 PROCEDURE — 2709999900 HC NON-CHARGEABLE SUPPLY

## 2022-02-01 PROCEDURE — 80069 RENAL FUNCTION PANEL: CPT

## 2022-02-01 PROCEDURE — 77010033678 HC OXYGEN DAILY

## 2022-02-01 PROCEDURE — 74011250637 HC RX REV CODE- 250/637: Performed by: INTERNAL MEDICINE

## 2022-02-01 PROCEDURE — 74011250637 HC RX REV CODE- 250/637: Performed by: EMERGENCY MEDICINE

## 2022-02-01 PROCEDURE — 97166 OT EVAL MOD COMPLEX 45 MIN: CPT

## 2022-02-01 PROCEDURE — 65660000000 HC RM CCU STEPDOWN

## 2022-02-01 PROCEDURE — 99222 1ST HOSP IP/OBS MODERATE 55: CPT | Performed by: INTERNAL MEDICINE

## 2022-02-01 PROCEDURE — 74011250637 HC RX REV CODE- 250/637: Performed by: FAMILY MEDICINE

## 2022-02-01 PROCEDURE — 74011000250 HC RX REV CODE- 250: Performed by: HOSPITALIST

## 2022-02-01 PROCEDURE — 74011636637 HC RX REV CODE- 636/637: Performed by: INTERNAL MEDICINE

## 2022-02-01 PROCEDURE — 99232 SBSQ HOSP IP/OBS MODERATE 35: CPT | Performed by: INTERNAL MEDICINE

## 2022-02-01 RX ADMIN — METOPROLOL TARTRATE 12.5 MG: 25 TABLET, FILM COATED ORAL at 21:07

## 2022-02-01 RX ADMIN — RIVAROXABAN 15 MG: 15 TABLET, FILM COATED ORAL at 08:50

## 2022-02-01 RX ADMIN — Medication 3 MG: at 21:07

## 2022-02-01 RX ADMIN — SODIUM CHLORIDE, PRESERVATIVE FREE 10 ML: 5 INJECTION INTRAVENOUS at 16:16

## 2022-02-01 RX ADMIN — FAMOTIDINE 20 MG: 20 TABLET ORAL at 08:50

## 2022-02-01 RX ADMIN — RIVAROXABAN 15 MG: 15 TABLET, FILM COATED ORAL at 17:17

## 2022-02-01 RX ADMIN — SODIUM CHLORIDE, PRESERVATIVE FREE 10 ML: 5 INJECTION INTRAVENOUS at 06:26

## 2022-02-01 RX ADMIN — SODIUM CHLORIDE, PRESERVATIVE FREE 10 ML: 5 INJECTION INTRAVENOUS at 21:08

## 2022-02-01 RX ADMIN — Medication 5000 UNITS: at 08:50

## 2022-02-01 RX ADMIN — IPRATROPIUM BROMIDE AND ALBUTEROL 1 PUFF: 20; 100 SPRAY, METERED RESPIRATORY (INHALATION) at 21:07

## 2022-02-01 RX ADMIN — Medication 3 UNITS: at 16:08

## 2022-02-01 RX ADMIN — Medication 6 UNITS: at 21:07

## 2022-02-01 RX ADMIN — MIRTAZAPINE 7.5 MG: 15 TABLET, FILM COATED ORAL at 21:07

## 2022-02-01 RX ADMIN — IPRATROPIUM BROMIDE AND ALBUTEROL 1 PUFF: 20; 100 SPRAY, METERED RESPIRATORY (INHALATION) at 11:50

## 2022-02-01 RX ADMIN — Medication 3 UNITS: at 11:48

## 2022-02-01 RX ADMIN — METOPROLOL TARTRATE 12.5 MG: 25 TABLET, FILM COATED ORAL at 08:50

## 2022-02-01 RX ADMIN — IPRATROPIUM BROMIDE AND ALBUTEROL 1 PUFF: 20; 100 SPRAY, METERED RESPIRATORY (INHALATION) at 16:17

## 2022-02-01 RX ADMIN — IPRATROPIUM BROMIDE AND ALBUTEROL 1 PUFF: 20; 100 SPRAY, METERED RESPIRATORY (INHALATION) at 09:55

## 2022-02-01 NOTE — PROGRESS NOTES
Hospitalist Progress Note    Patient: Alexander Mijares Age: 46 y.o. : 1970 MR#: [id number] SSN: [id number]  Date/Time: 2022     DOA: 2022  PCP: Jessica Cardoza MD    Subjective:     Patient feels much better. Continues to require 3 to 4 L oxygen. His saturation goes down to 70% range on oxygen during exertion per physical therapist.  Patient denies any chest pain or abdominal pain. No nausea or vomiting. Spoke to patient's at bedside about going home versus skilled nursing facility. He understands that his wife will not be able to help him too much if he goes home and he will definitely benefit from short-term rehab. Spoke to patient's wife over the phone: Patient was able to walk without any help before coming to the hospital.  It will be extremely difficult for her to take care of him at home at this point and she wants him to go to short-term rehab. Assessment/Plan:     1. Acute respiratory failure with hypoxia due to COVID-19 infection      2. COVID-19 pneumonia s/p treatment  3. MONICA on CKD3, associate with covid infection, resolving  4. Elevated troponin, demand ischemia,   5. Hyperglycemia with T2DM, HgbA1c 7.8%  6. Acute DVT on legs, associate hypercoagulable state due to covid   7. H/o stroke with right-sided hemiparesis   8. . Stage 2 pressure ulcer of the gluteal cleft, POA   9. Hypomagnesemia, better  10. Exertional hypoxia due to deconditioning from recent COVID-19 infection. PLAN:    Continue 3 L oxygen during rest.  Advised nursing to check/evaluate patient for home oxygen need. Continue incentive spirometry, Combivent and bronchial hygiene. Discontinue droplet plus isolation as it has been more than 20 days since he was positive on 2022  Continuing Xarelto and monitor for bleeding  Avoid nephrotoxic meds, nephrology is following  PT and OT to follow this patient  Discussed with ARU coordinator: They were concerned about exertional hypoxia.   They wanted me to have pulmonary input about it. I have talked to Dr. Virgen Bales about it. Patient is otherwise medically stable to transition to rehab/SNF with oxygen. Discussed with  about it. Case discussed with:  [x]Patient  [x]Family  [x]Nursing  [x]Case Management  DVT Prophylaxis:  []Lovenox  [x]Xarelto  []SCDs  []Coumadin   []On Heparin gtt    Total time to take care of this patient was 30 minutes and more than 50% of time was spent counseling and coordinating care. Objective:   VS:   Visit Vitals  /89   Pulse 71   Temp 98.1 °F (36.7 °C)   Resp 20   Ht 5' 11\" (1.803 m)   Wt 94.3 kg (208 lb)   SpO2 96%   BMI 29.01 kg/m²      Tmax/24hrs: Temp (24hrs), Av.2 °F (36.8 °C), Min:98.1 °F (36.7 °C), Max:98.6 °F (37 °C)      Intake/Output Summary (Last 24 hours) at 2022 4993  Last data filed at 2022 0850  Gross per 24 hour   Intake 240 ml   Output --   Net 240 ml       General:  Awake, follows commands, not in acute distress  Cardiovascular:  S1S2+, RRR  Pulmonary: Diminished breath sound bibasilar, no wheezes  GI:  Soft, BS+, NT, ND  Extremities:  No pedal edema  CNS: Moves left side very well.   Right-sided weakness      Current Facility-Administered Medications   Medication Dose Route Frequency    melatonin tablet 3 mg  3 mg Oral QHS    ipratropium-albuterol (COMBIVENT RESPIMAT) 20 mcg-100 mcg inhalation spray  1 Puff Inhalation QID RT    mirtazapine (REMERON) tablet 7.5 mg  7.5 mg Oral QHS    metoprolol tartrate (LOPRESSOR) tablet 12.5 mg  12.5 mg Oral Q12H    dextrose 10% infusion 125-250 mL  125-250 mL IntraVENous PRN    hydrALAZINE (APRESOLINE) 20 mg/mL injection 10 mg  10 mg IntraVENous Q6H PRN    [Held by provider] amLODIPine (NORVASC) tablet 10 mg  10 mg Oral DAILY    rivaroxaban (XARELTO) tablet 15 mg  15 mg Oral BID WITH MEALS    sodium chloride (NS) flush 5-40 mL  5-40 mL IntraVENous Q8H    sodium chloride (NS) flush 5-40 mL  5-40 mL IntraVENous PRN    acetaminophen (TYLENOL) tablet 650 mg  650 mg Oral Q6H PRN    Or    acetaminophen (TYLENOL) suppository 650 mg  650 mg Rectal Q6H PRN    polyethylene glycol (MIRALAX) packet 17 g  17 g Oral DAILY PRN    ondansetron (ZOFRAN ODT) tablet 4 mg  4 mg Oral Q8H PRN    Or    ondansetron (ZOFRAN) injection 4 mg  4 mg IntraVENous Q6H PRN    insulin lispro (HUMALOG) injection   SubCUTAneous AC&HS    glucose chewable tablet 16 g  4 Tablet Oral PRN    glucagon (GLUCAGEN) injection 1 mg  1 mg IntraMUSCular PRN    famotidine (PEPCID) tablet 20 mg  20 mg Oral DAILY    cholecalciferol (VITAMIN D3) capsule 5,000 Units  5,000 Units Oral DAILY        Recent Results (from the past 24 hour(s))   GLUCOSE, POC    Collection Time: 01/31/22 12:45 PM   Result Value Ref Range    Glucose (POC) 123 (H) 70 - 110 mg/dL   GLUCOSE, POC    Collection Time: 01/31/22  4:28 PM   Result Value Ref Range    Glucose (POC) 107 70 - 110 mg/dL   GLUCOSE, POC    Collection Time: 01/31/22  9:04 PM   Result Value Ref Range    Glucose (POC) 227 (H) 70 - 110 mg/dL   RENAL FUNCTION PANEL    Collection Time: 02/01/22 12:30 AM   Result Value Ref Range    Sodium 137 136 - 145 mmol/L    Potassium 4.2 3.5 - 5.5 mmol/L    Chloride 106 100 - 111 mmol/L    CO2 27 21 - 32 mmol/L    Anion gap 4 3.0 - 18 mmol/L    Glucose 135 (H) 74 - 99 mg/dL    BUN 20 (H) 7.0 - 18 MG/DL    Creatinine 1.34 (H) 0.6 - 1.3 MG/DL    BUN/Creatinine ratio 15 12 - 20      GFR est AA >60 >60 ml/min/1.73m2    GFR est non-AA 56 (L) >60 ml/min/1.73m2    Calcium 8.4 (L) 8.5 - 10.1 MG/DL    Phosphorus 3.6 2.5 - 4.9 MG/DL    Albumin 2.1 (L) 3.4 - 5.0 g/dL   GLUCOSE, POC    Collection Time: 02/01/22  6:45 AM   Result Value Ref Range    Glucose (POC) 136 (H) 70 - 110 mg/dL     Disclaimer: Sections of this note are dictated using utilizing voice recognition software, which may have resulted in some phonetic based errors in grammar and contents.  Even though attempts were made to correct all the mistakes, some may have been missed, and remained in the body of the document. If questions arise, please contact our department.

## 2022-02-01 NOTE — PROGRESS NOTES
Problem: Diabetes Self-Management  Goal: *Disease process and treatment process  Description: Define diabetes and identify own type of diabetes; list 3 options for treating diabetes. Outcome: Progressing Towards Goal  Goal: *Incorporating nutritional management into lifestyle  Description: Describe effect of type, amount and timing of food on blood glucose; list 3 methods for planning meals. Outcome: Progressing Towards Goal  Goal: *Incorporating physical activity into lifestyle  Description: State effect of exercise on blood glucose levels. Outcome: Progressing Towards Goal  Goal: *Developing strategies to promote health/change behavior  Description: Define the ABC's of diabetes; identify appropriate screenings, schedule and personal plan for screenings. Outcome: Progressing Towards Goal  Goal: *Using medications safely  Description: State effect of diabetes medications on diabetes; name diabetes medication taking, action and side effects. Outcome: Progressing Towards Goal  Goal: *Monitoring blood glucose, interpreting and using results  Description: Identify recommended blood glucose targets  and personal targets. Outcome: Progressing Towards Goal  Goal: *Prevention, detection, treatment of acute complications  Description: List symptoms of hyper- and hypoglycemia; describe how to treat low blood sugar and actions for lowering  high blood glucose level. Outcome: Progressing Towards Goal  Goal: *Prevention, detection and treatment of chronic complications  Description: Define the natural course of diabetes and describe the relationship of blood glucose levels to long term complications of diabetes.   Outcome: Progressing Towards Goal  Goal: *Developing strategies to address psychosocial issues  Description: Describe feelings about living with diabetes; identify support needed and support network  Outcome: Progressing Towards Goal  Goal: *Sick day guidelines  Outcome: Progressing Towards Goal  Goal: *Patient Specific Goal (EDIT GOAL, INSERT TEXT)  Outcome: Progressing Towards Goal     Problem: Patient Education: Go to Patient Education Activity  Goal: Patient/Family Education  Outcome: Progressing Towards Goal     Problem: Patient Education: Go to Patient Education Activity  Goal: Patient/Family Education  Outcome: Progressing Towards Goal     Problem: Airway Clearance - Ineffective  Goal: Achieve or maintain patent airway  Outcome: Progressing Towards Goal     Problem: Gas Exchange - Impaired  Goal: Absence of hypoxia  Outcome: Progressing Towards Goal  Goal: Promote optimal lung function  Outcome: Progressing Towards Goal     Problem: Breathing Pattern - Ineffective  Goal: Ability to achieve and maintain a regular respiratory rate  Outcome: Progressing Towards Goal     Problem:  Body Temperature -  Risk of, Imbalanced  Goal: Ability to maintain a body temperature within defined limits  Outcome: Progressing Towards Goal  Goal: Will regain or maintain usual level of consciousness  Outcome: Progressing Towards Goal  Goal: Complications related to the disease process, condition or treatment will be avoided or minimized  Outcome: Progressing Towards Goal     Problem: Isolation Precautions - Risk of Spread of Infection  Goal: Prevent transmission of infectious organism to others  Outcome: Progressing Towards Goal     Problem: Nutrition Deficits  Goal: Optimize nutrtional status  Outcome: Progressing Towards Goal     Problem: Risk for Fluid Volume Deficit  Goal: Maintain normal heart rhythm  Outcome: Progressing Towards Goal  Goal: Maintain absence of muscle cramping  Outcome: Progressing Towards Goal  Goal: Maintain normal serum potassium, sodium, calcium, phosphorus, and pH  Outcome: Progressing Towards Goal     Problem: Loneliness or Risk for Loneliness  Goal: Demonstrate positive use of time alone when socialization is not possible  Outcome: Progressing Towards Goal     Problem: Fatigue  Goal: Verbalize increase energy and improved vitality  Outcome: Progressing Towards Goal     Problem: Patient Education: Go to Patient Education Activity  Goal: Patient/Family Education  Outcome: Progressing Towards Goal     Problem: Non-Violent Restraints  Goal: Removal from restraints as soon as assessed to be safe  Outcome: Progressing Towards Goal  Goal: No harm/injury to patient while restraints in use  Outcome: Progressing Towards Goal  Goal: Patient's dignity will be maintained  Outcome: Progressing Towards Goal  Goal: Patient Interventions  Outcome: Progressing Towards Goal     Problem: Pressure Injury - Risk of  Goal: *Prevention of pressure injury  Description: Document Dario Scale and appropriate interventions in the flowsheet. Outcome: Progressing Towards Goal  Note: Pressure Injury Interventions:  Sensory Interventions: Assess changes in LOC,Keep linens dry and wrinkle-free,Maintain/enhance activity level,Minimize linen layers,Pressure redistribution bed/mattress (bed type),Turn and reposition approx. every two hours (pillows and wedges if needed)    Moisture Interventions: Absorbent underpads,Check for incontinence Q2 hours and as needed,Maintain skin hydration (lotion/cream),Minimize layers    Activity Interventions: Pressure redistribution bed/mattress(bed type),PT/OT evaluation,Increase time out of bed    Mobility Interventions: HOB 30 degrees or less,Pressure redistribution bed/mattress (bed type),PT/OT evaluation,Turn and reposition approx. every two hours(pillow and wedges)    Nutrition Interventions: Document food/fluid/supplement intake    Friction and Shear Interventions: HOB 30 degrees or less,Minimize layers                Problem: Patient Education: Go to Patient Education Activity  Goal: Patient/Family Education  Outcome: Progressing Towards Goal     Problem: Falls - Risk of  Goal: *Absence of Falls  Description: Document Mariel Fall Risk and appropriate interventions in the flowsheet.   Outcome: Progressing Towards Goal     Problem: Patient Education: Go to Patient Education Activity  Goal: Patient/Family Education  Outcome: Progressing Towards Goal     Problem: Pain  Goal: *Control of Pain  Outcome: Progressing Towards Goal     Problem: Patient Education: Go to Patient Education Activity  Goal: Patient/Family Education  Outcome: Progressing Towards Goal

## 2022-02-01 NOTE — ROUTINE PROCESS
Bedside and Verbal shift change report given to [facility] LPN (oncoming nurse) by Mahnaz Fitzpatrick RN   (offgoing nurse). Report included the following information SBAR, Kardex, Intake/Output, MAR and Recent Results.

## 2022-02-01 NOTE — PROGRESS NOTES
Problem: Mobility Impaired (Adult and Pediatric)  Goal: *Acute Goals and Plan of Care (Insert Text)  Description: Physical Therapy Goals  Initiated 1/18/2022, re-evaluated 1/25/22 and goals adjusted as needed and to be accomplished within 7 day(s), re-evaluated 1/31/22  1. Patient will move from supine to sit and sit to supine , scoot up and down, and roll side to side in bed with supervision. 2.  Patient will transfer from bed to chair and chair to bed with CGA using the least restrictive device. 3.  Patient will perform sit to stand with CGA  4. Patient will ambulate with Sheyla for 20 feet with the least restrictive device. 5.  Patient will ascend/descend 4 stairs with unilateral handrail(s) with minimal assistance/contact guard assist.    PLOF: Pt reporting he ambulates without AD, assist sometimes for bed mobility. Lives with wife in 1 story house with 4 YURIY. Outcome: Progressing Towards Goal     PHYSICAL THERAPY TREATMENT    Patient: Thelma Atkinson (72 y.o. male)  Date: 2/1/2022  Diagnosis: COVID [U07.1]  Pneumonia due to COVID-19 virus [U07.1, J12.82] <principal problem not specified>       Precautions: Fall,Skin    ASSESSMENT:  Patient received semi reclined in bed on 3L NC, in NAD, and agreeable to PT treatment. Mod A at trunk to push up from right sidelying into sitting with HOB elevated. O2 highest 86 % sitting EOB. He becomes tearful and has multiple coughing spells, which causes him to desat into the mid 70's. 3 standing trial completed with min A x2. He requires verbal cues to transition hand up to the walker for upright position and rehab tech stabilizing right side of walker. Pt is unable to hold onto walker with right hand due to weakness from CVA. Patient had difficulty with lateral weight shift to unweight right leg to take a step. He returns to supine with min A. Educated to participate in LE exercises and rolling in bed to increase strength.  Also spoke with nursing to help get patient sitting EOB during medication distribution in order to increase endurance. Patient resting at mid 80's on 5L supine in bed. Progression toward goals:   []      Improving appropriately and progressing toward goals  [x]      Improving slowly and progressing toward goals  []      Not making progress toward goals and plan of care will be adjusted     PLAN:  Patient continues to benefit from skilled intervention to address the above impairments. Continue treatment per established plan of care. Discharge Recommendations:  Rehab  Further Equipment Recommendations for Discharge: TBD     SUBJECTIVE:   Patient stated I can't breathe.  \" I can't walk \"    OBJECTIVE DATA SUMMARY:   Critical Behavior:  Neurologic State: Alert,Eyes open spontaneously  Orientation Level: Oriented to person,Oriented to place,Oriented to time  Cognition: Follows commands  Safety/Judgement: Fall prevention  Functional Mobility Training:  Bed Mobility:     Supine to Sit: Moderate assistance; additional time with HOB elevated  Sit to Supine: Minimum assistance            Transfers:  Sit to Stand: Minimum assistance;Assist x2  Stand to Sit: Minimum assistance                   Balance:  Sitting: Intact  Standing: Impaired; With support  Standing - Static: Fair  Standing - Dynamic : Poor         Ambulation/Gait Training:  Distance (ft):  (1 lateral step)  Assistive Device: Walker, rolling  Ambulation - Level of Assistance: Minimal assistance;Assist x2  Gait Abnormalities: Decreased step clearance  Step Length: Right shortened;Left shortened          Pain:  Pain level pre-treatment: 0/10  Pain level post-treatment: 0/10   Pain Intervention(s): Medication (see MAR); Rest, Ice, Repositioning   Response to intervention: Nurse notified, See doc flow    Activity Tolerance:   FAir   Please refer to the flowsheet for vital signs taken during this treatment.   After treatment:   [] Patient left in no apparent distress sitting up in chair  [x] Patient left in no apparent distress in bed  [x] Call bell left within reach  [x] Nursing notified  [] Caregiver present  [x] Bed alarm activated  [] SCDs applied      COMMUNICATION/EDUCATION:   []         Role of Physical Therapy in the acute care setting. [x]         Fall prevention education was provided and the patient/caregiver indicated understanding. [x]         Patient/family have participated as able in working toward goals and plan of care. [x]         Patient/family agree to work toward stated goals and plan of care. []         Patient understands intent and goals of therapy, but is neutral about his/her participation.   []         Patient is unable to participate in stated goals/plan of care: ongoing with therapy staff.  []         Other:        Ge Andrade, PT   Time Calculation: 25 mins

## 2022-02-01 NOTE — PROGRESS NOTES
Problem: Self Care Deficits Care Plan (Adult)  Goal: *Acute Goals and Plan of Care (Insert Text)  Description: Occupational Therapy Goals  Re-evaluation 2/1/2022, pt is making slow but steady progress towards goals, pt is inhibited by O2 desaturation while on O2 @ 5 LPM  1. Patient will perform grooming with supervision/set-up. 2.  Patient will perform bathing with minimal assistance/contact guard assist.  3.  Patient will perform upper body dressing and lower body dressing with minimal assistance/contact guard assist.  4.  Patient will perform bedside commode transfers with minimal assistance/contact guard assist using LRAD. 5.  Patient will perform all aspects of toileting with minimal assistance/contact guard assist.  6.  Patient will participate in upper extremity therapeutic exercise/activities with supervision/set-up for 8 minutes. 7.  Patient will utilize energy conservation techniques during functional activities with min verbal cues. Prior Level of Function: pt demonstrating difficulty providing accurate PLOF e.g. use of AD for functional mobility - pt states he used a walker and also states he does not use a walker. Pt indicated that his spouse assists with shower transfer and bathing tasks while seated on shower seat and spouse also assists with dressing tasks. Outcome: Progressing Towards Goal   OCCUPATIONAL THERAPY RE-EVALUATION    Patient: Lacie De Guzman (47 y.o. male)  Date: 2/1/2022  Primary Diagnosis: COVID [U07.1]  Pneumonia due to COVID-19 virus [U07.1, J12.82]        Precautions:   Fall,Skin  PLOF: pt demonstrating difficulty providing accurate PLOF e.g. use of AD for functional mobility - pt states he used a walker and also states he does not use a walker. Pt indicated that his spouse assists with shower transfer and bathing tasks while seated on shower seat and spouse also assists with dressing tasks.      ASSESSMENT :  Nursing/RN cleared for pt to participate in OT re-evaluation and tx session, report that pt desatting with functional mobility while on O2 @ 5 LPM.Pt presents lying semi-reclined in bed - O2 @ 92%, A & O x 2 (self and place)-re-orientated to date and situation, mild emotional lability noted-nursing notified. Bed mobility: CGA supine <-> sit on edge of bed w/ head of bed slightly elevated, additional time and vc's for scooting to edge of bed and squaring off w/ BLEs/feet on floor for improved balance, pt able to scoot towards left with additional time in prep to lay supine with optimal bed positioning- O2 @ 78%, instruction to resume semi-reclined in bed, once lying semi-reclined, O2 improved in ~ 1 min to 94 % - nursing notified. Call bell within reach & pt verbalized understanding and provided return demonstration to utilize for assist e.g. functional transfers in order to prevent falls. Patient will benefit from skilled intervention to address the above impairments.   Patient's rehabilitation potential is considered to be Good  Factors which may influence rehabilitation potential include:   []             None noted  []             Mental ability/status  [x]             Medical condition  []             Home/family situation and support systems  []             Safety awareness  []             Pain tolerance/management  []             Other:      PLAN :  Recommendations and Planned Interventions:   [x]               Self Care Training                  [x]      Therapeutic Activities  [x]               Functional Mobility Training   []      Cognitive Retraining  [x]               Therapeutic Exercises           [x]      Endurance Activities  [x]               Balance Training                    [x]      Neuromuscular Re-Education  []               Visual/Perceptual Training     [x]      Home Safety Training  [x]               Patient Education                   [x]      Family Training/Education  []               Other (comment):    Frequency/Duration: Patient will be followed by occupational therapy 3-5 times a week to address goals. Discharge Recommendations: Rehab  Further Equipment Recommendations for Discharge: bedside commode, rolling walker, and wheelchair      SUBJECTIVE:   Patient stated I'm fine.     OBJECTIVE DATA SUMMARY:   Hospital course since last seen and reason for reevaluation: pt is making slow but steady progress towards goals, pt is inhibited by O2 desaturation while on O2 @ 5 LPM  Past Medical History:   Diagnosis Date    CVA (cerebral vascular accident) ([facility] [address]     right-sided deficit    Diabetes ([facility] [address]     HTN (hypertension)      Past Surgical History:   Procedure Laterality Date    HX HIP FRACTURE TX      left    HX ORTHOPAEDIC      hip     Barriers to Learning/Limitations: yes;  sensory deficits-vision/hearing/speech, physical, and altered mental status (i.e.Sedation, Confusion)  Compensate with: visual, verbal, tactile, kinesthetic cues/model    Home Situation:   Home Situation  Home Environment: Private residence  # Steps to Enter: 4  Rails to Enter: Yes  Wheelchair Ramp: No  One/Two Story Residence: One story  Living Alone: No  Support Systems: Spouse/Significant Other  Patient Expects to be Discharged to[de-identified] Home with home health  Current DME Used/Available at Home: [employer] [employer] chair  Tub or Shower Type: Shower  []  Right hand dominant   []  Left hand dominant    Cognitive/Behavioral Status:  Neurologic State: Alert  Orientation Level: Oriented to place;Oriented to person;Disoriented to situation;Disoriented to time  Cognition: Follows commands  Safety/Judgement: Fall prevention    Skin: sacral wound   Edema: none noted    Vision/Perceptual:  appears intact      Coordination: BUE  Coordination:  (BELLAE WFL, NORMA impaired-hemiplegia)  Fine Motor Skills-Upper: Right Impaired;Left Intact    Gross Motor Skills-Upper: Left Intact; Right Impaired    Balance:  Sitting: Intact  Sitting - Static: Fair (occasional)  Sitting - Dynamic: Fair (occasional)  Standing: Impaired; With support  Standing - Static: Fair  Standing - Dynamic : Poor    Strength: BUE  Strength:  (BELLAE WFL, RUE impaired-hemiplegia)     Tone & Sensation: BUE  Tone:  (BELLAE WFL, RUE [id number])     Range of Motion: BUE  AROM:  (LUE WFL, RUE impaired-hemiplegia)  PROM: Within functional limits (RUE w/ hemiplegia )     Functional Mobility and Transfers for ADLs:  Bed Mobility:     Supine to Sit: Contact guard assistance  Sit to Supine: Contact guard assistance  Scooting: Contact guard assistance  Transfers:  Sit to Stand: Minimum assistance;Assist x2  Stand to Sit: Minimum assistance     ADL Assessment:   Oral Facial Hygiene/Grooming: Stand-by assistance;Setup    ADL Intervention:  Grooming  Position Performed: Other (comment) (semi-reclined in bed)  Washing Face: Stand-by assistance;Set-up    Cognitive Retraining  Safety/Judgement: Fall prevention    Pain:  Pain level pre-treatment: 0/10   Pain level post-treatment: 0/10    Activity Tolerance:   poor  Please refer to the flowsheet for vital signs taken during this treatment. After treatment:   [] Patient left in no apparent distress sitting up in chair  [x] Patient left in no apparent distress in bed  [x] Call bell left within reach  [x] Nursing notified  [] Caregiver present  [x] Bed alarm activated    COMMUNICATION/EDUCATION:   [x] Role of Occupational Therapy in the acute care setting  [x] Home safety education was provided and the patient/caregiver indicated understanding. [x] Patient/family have participated as able in goal setting and plan of care. [x] Patient/family agree to work toward stated goals and plan of care. [] Patient understands intent and goals of therapy, but is neutral about his/her participation. [] Patient is unable to participate in goal setting and plan of care.     Thank you for this referral.  Samantha Tucker  Time Calculation: 11 mins

## 2022-02-01 NOTE — PROGRESS NOTES
Patient in bed resting observed w/o O2/NC; NC applied O2/5L via NC education provided. HOB elevated. Encouraged patient to use incentive spirometer.

## 2022-02-01 NOTE — ROUTINE PROCESS
Bedside and Verbal shift change report given to Jaquelin Pathak RN (oncoming nurse) by Harish Cross (offgoing nurse). Report included the following information SBAR, Kardex and Recent Results.

## 2022-02-01 NOTE — CONSULTS
[employer] [facility]. Pulmonary, Critical Care, and Sleep Medicine    Initial Patient Consult    Name: Thelma Atkinson MRN: [id number]   : 1970 Hospital: [facility]   Date: 2022        IMPRESSION:   · Acute Hypoxic Respiratory Failure  · - 2/2 covid19  · - improving on NC- currently on 3L. · [id number] Pneumonia  · - Dx   · - completed decadron  · LE DVTs  · - likely covid related  · - on Xarelto  · MONICA on CKDIII  · H/O CVA  · DMII     Patient Active Problem List   Diagnosis Code    Pneumonia due to COVID-19 virus U07.1, J12.82    Acute respiratory failure with hypoxia ([facility] J96.01    Acute deep vein thrombosis (DVT) of both lower extremities (HCC) I82.403    Hypercoagulable state associated with COVID-19 ([address] U07.1, D68.69    MONICA (acute kidney injury) ([facility] N17.9    Non-traumatic rhabdomyolysis M62.82      RECOMMENDATIONS:   · Wean NC as tolerated  · Agree with acute rehab placement  · Aggressive PT/OT  · Continue Xarelto  · Combivent PRN    Will sign off. Subjective: This patient has been seen and evaluated at the request of Dr. aPwan Benavidez for [id number]. Patient is a 46 y.o. male who was admitted for covid19 on . He required HFNC and received decadron. He was found to have a DVT and is on Xarelto. He is now improving and on NC. He is pending acute rehab. On my assessment, the patient appears comfortable. He is on NC. No respiratory complaints. No cough. Past Medical History:   Diagnosis Date    CVA (cerebral vascular accident) ([address]     right-sided deficit    Diabetes ([address]     HTN (hypertension)       Past Surgical History:   Procedure Laterality Date    HX HIP FRACTURE TX      left    HX ORTHOPAEDIC      hip      Prior to Admission medications    Medication Sig Start Date End Date Taking? Authorizing Provider   amLODIPine-Olmesartan 10-40 mg tab Take  by mouth.     Other, MD Jakob   SITagliptin-metFORMIN (JANUMET) 50-1,000 mg per tablet Take 1 Tab by mouth two (2) times daily (with meals). Jakob Garcia MD   metoprolol (LOPRESSOR) 50 mg tablet Take 1 Tab by mouth two (2) times a day. 3/24/14   Edin Barahona MD   HYDROcodone-acetaminophen [facility]) 5-325 mg per tablet Take 1 Tab by mouth every four (4) hours as needed for Pain. 3/24/14   Edin Barahona MD     Allergies   Allergen Reactions    Grape Hives      Social History     Tobacco Use    Smoking status: Never Smoker    Smokeless tobacco: Never Used   Substance Use Topics    Alcohol use: No      Family History   Problem Relation Age of Onset    Stroke Neg Hx     Hypertension Neg Hx     Heart Disease Neg Hx     Diabetes Neg Hx     Cancer Neg Hx         Current Facility-Administered Medications   Medication Dose Route Frequency    melatonin tablet 3 mg  3 mg Oral QHS    ipratropium-albuterol (COMBIVENT RESPIMAT) 20 mcg-100 mcg inhalation spray  1 Puff Inhalation QID RT    mirtazapine (REMERON) tablet 7.5 mg  7.5 mg Oral QHS    metoprolol tartrate (LOPRESSOR) tablet 12.5 mg  12.5 mg Oral Q12H    rivaroxaban (XARELTO) tablet 15 mg  15 mg Oral BID WITH MEALS    sodium chloride (NS) flush 5-40 mL  5-40 mL IntraVENous Q8H    insulin lispro (HUMALOG) injection   SubCUTAneous AC&HS    famotidine (PEPCID) tablet 20 mg  20 mg Oral DAILY    cholecalciferol (VITAMIN D3) capsule 5,000 Units  5,000 Units Oral DAILY       Review of Systems:  Pertinent items are noted in HPI. ROS    Objective:   Vital Signs:    Visit Vitals  BP (!) 127/90   Pulse 72   Temp 97.9 °F (36.6 °C)   Resp 18   Ht 5' 11\" (1.803 m)   Wt 94.3 kg (208 lb)   SpO2 97%   BMI 29.01 kg/m²       O2 Device: Nasal cannula   O2 Flow Rate (L/min): 3 l/min   Temp (24hrs), Av °F (36.7 °C), Min:97.3 °F (36.3 °C), Max:98.6 °F (37 °C)       Intake/Output:   Last shift:       0701 -  1900  In: 240 [P.O.:240]  Out: -   Last 3 shifts: No intake/output data recorded.     Intake/Output Summary (Last 24 hours) at 2022 1413  Last data filed at 2/1/2022 0850  Gross per 24 hour   Intake 240 ml   Output --   Net 240 ml      Physical Exam:   General:  Alert, cooperative, no distress, appears stated age. Head:  Normocephalic, without obvious abnormality, atraumatic. Lungs:   Bilateral auscultation mostly clear, no wheezing. Chest wall:  No tenderness or deformity. NO CREPITUS   Heart:  Regular rate and rhythm, S1, S2 normal, no murmur, click, rub or gallop. Abdomen:   Soft, non-tender. Bowel sounds normal. No masses,  No organomegaly. No paradox   Extremities: normal, atraumatic, no cyanosis or edema. Pulses: 1-2+ and symmetric all extremities.    Skin: Skin color, texture, turgor normal. No rashes or lesions   Lymph nodes: Cervical, supraclavicular, and axillary nodes normal.   Neurologic: Grossly nonfocal          Data review:   Labs:  Recent Results (from the past 24 hour(s))   GLUCOSE, POC    Collection Time: 01/31/22  4:28 PM   Result Value Ref Range    Glucose (POC) 107 70 - 110 mg/dL   GLUCOSE, POC    Collection Time: 01/31/22  9:04 PM   Result Value Ref Range    Glucose (POC) 227 (H) 70 - 110 mg/dL   RENAL FUNCTION PANEL    Collection Time: 02/01/22 12:30 AM   Result Value Ref Range    Sodium 137 136 - 145 mmol/L    Potassium 4.2 3.5 - 5.5 mmol/L    Chloride 106 100 - 111 mmol/L    CO2 27 21 - 32 mmol/L    Anion gap 4 3.0 - 18 mmol/L    Glucose 135 (H) 74 - 99 mg/dL    BUN 20 (H) 7.0 - 18 MG/DL    Creatinine 1.34 (H) 0.6 - 1.3 MG/DL    BUN/Creatinine ratio 15 12 - 20      GFR est AA >60 >60 ml/min/1.73m2    GFR est non-AA 56 (L) >60 ml/min/1.73m2    Calcium 8.4 (L) 8.5 - 10.1 MG/DL    Phosphorus 3.6 2.5 - 4.9 MG/DL    Albumin 2.1 (L) 3.4 - 5.0 g/dL   GLUCOSE, POC    Collection Time: 02/01/22  6:45 AM   Result Value Ref Range    Glucose (POC) 136 (H) 70 - 110 mg/dL   GLUCOSE, POC    Collection Time: 02/01/22 11:14 AM   Result Value Ref Range    Glucose (POC) 189 (H) 70 - 110 mg/dL     Imaging:  I have personally reviewed the patients radiographs and have reviewed the reports:  CXR Results  (Last 48 hours)    None        CT Results  (Last 48 hours)    None            High complexity decision making was performed during the evaluation of this patient at high risk for decompensation with multiple organ involvement     Above mentioned total time spent on reviewing the case/medical record/data/notes/EMR/patient examination/documentation/coordinating care with nurse/consultants, exclusive of procedures with complex decision making performed and > 50% time spent in face to face evaluation.      Baron Jennifer MD

## 2022-02-01 NOTE — PROGRESS NOTES
CM spoke with patient, patient is still adamant that he does not want to go to a SNF, even though Doctor is recommending SNF. Patient wants to go home with home health.              Donna Roman, RN  Case Management [phone]

## 2022-02-02 LAB
ALBUMIN SERPL-MCNC: 2.2 G/DL (ref 3.4–5)
ANION GAP SERPL CALC-SCNC: 3 MMOL/L (ref 3–18)
BUN SERPL-MCNC: 22 MG/DL (ref 7–18)
BUN/CREAT SERPL: 16 (ref 12–20)
CALCIUM SERPL-MCNC: 8.9 MG/DL (ref 8.5–10.1)
CHLORIDE SERPL-SCNC: 106 MMOL/L (ref 100–111)
CO2 SERPL-SCNC: 29 MMOL/L (ref 21–32)
CREAT SERPL-MCNC: 1.37 MG/DL (ref 0.6–1.3)
GLUCOSE BLD STRIP.AUTO-MCNC: 125 MG/DL (ref 70–110)
GLUCOSE BLD STRIP.AUTO-MCNC: 171 MG/DL (ref 70–110)
GLUCOSE BLD STRIP.AUTO-MCNC: 197 MG/DL (ref 70–110)
GLUCOSE BLD STRIP.AUTO-MCNC: 264 MG/DL (ref 70–110)
GLUCOSE SERPL-MCNC: 127 MG/DL (ref 74–99)
PHOSPHATE SERPL-MCNC: 3.4 MG/DL (ref 2.5–4.9)
POTASSIUM SERPL-SCNC: 4.4 MMOL/L (ref 3.5–5.5)
SODIUM SERPL-SCNC: 138 MMOL/L (ref 136–145)

## 2022-02-02 PROCEDURE — 74011000250 HC RX REV CODE- 250: Performed by: HOSPITALIST

## 2022-02-02 PROCEDURE — 74011250637 HC RX REV CODE- 250/637: Performed by: FAMILY MEDICINE

## 2022-02-02 PROCEDURE — 99239 HOSP IP/OBS DSCHRG MGMT >30: CPT | Performed by: INTERNAL MEDICINE

## 2022-02-02 PROCEDURE — 82962 GLUCOSE BLOOD TEST: CPT

## 2022-02-02 PROCEDURE — 74011250637 HC RX REV CODE- 250/637: Performed by: INTERNAL MEDICINE

## 2022-02-02 PROCEDURE — 74011250637 HC RX REV CODE- 250/637: Performed by: EMERGENCY MEDICINE

## 2022-02-02 PROCEDURE — 80069 RENAL FUNCTION PANEL: CPT

## 2022-02-02 PROCEDURE — 77030040392 HC DRSG OPTIFOAM MDII -A

## 2022-02-02 PROCEDURE — 2709999900 HC NON-CHARGEABLE SUPPLY

## 2022-02-02 PROCEDURE — 97530 THERAPEUTIC ACTIVITIES: CPT

## 2022-02-02 PROCEDURE — 36415 COLL VENOUS BLD VENIPUNCTURE: CPT

## 2022-02-02 PROCEDURE — 74011636637 HC RX REV CODE- 636/637: Performed by: INTERNAL MEDICINE

## 2022-02-02 PROCEDURE — 65660000000 HC RM CCU STEPDOWN

## 2022-02-02 PROCEDURE — 74011000250 HC RX REV CODE- 250: Performed by: INTERNAL MEDICINE

## 2022-02-02 PROCEDURE — 94640 AIRWAY INHALATION TREATMENT: CPT

## 2022-02-02 RX ORDER — ATORVASTATIN CALCIUM 20 MG/1
40 TABLET, FILM COATED ORAL
Qty: 30 TABLET | Refills: 0 | Status: SHIPPED | OUTPATIENT
Start: 2022-02-02

## 2022-02-02 RX ORDER — ATORVASTATIN CALCIUM 20 MG/1
20 TABLET, FILM COATED ORAL
Status: DISCONTINUED | OUTPATIENT
Start: 2022-02-02 | End: 2022-02-04 | Stop reason: HOSPADM

## 2022-02-02 RX ORDER — FAMOTIDINE 20 MG/1
20 TABLET, FILM COATED ORAL DAILY
Qty: 30 TABLET | Refills: 0 | Status: SHIPPED | OUTPATIENT
Start: 2022-02-03

## 2022-02-02 RX ORDER — ACETAMINOPHEN 500 MG
500 TABLET ORAL
Qty: 20 TABLET | Refills: 0 | Status: SHIPPED
Start: 2022-02-02

## 2022-02-02 RX ORDER — MIRTAZAPINE 7.5 MG/1
7.5 TABLET, FILM COATED ORAL
Qty: 30 TABLET | Refills: 0 | Status: SHIPPED | OUTPATIENT
Start: 2022-02-02

## 2022-02-02 RX ORDER — ATORVASTATIN CALCIUM 40 MG/1
40 TABLET, FILM COATED ORAL
Status: DISCONTINUED | OUTPATIENT
Start: 2022-02-02 | End: 2022-02-02

## 2022-02-02 RX ORDER — LANOLIN ALCOHOL/MO/W.PET/CERES
3 CREAM (GRAM) TOPICAL
Qty: 20 TABLET | Refills: 0 | Status: SHIPPED
Start: 2022-02-02

## 2022-02-02 RX ORDER — METOPROLOL TARTRATE 25 MG/1
12.5 TABLET, FILM COATED ORAL EVERY 12 HOURS
Qty: 30 TABLET | Refills: 0 | Status: SHIPPED | OUTPATIENT
Start: 2022-02-02

## 2022-02-02 RX ORDER — IPRATROPIUM BROMIDE AND ALBUTEROL SULFATE 2.5; .5 MG/3ML; MG/3ML
3 SOLUTION RESPIRATORY (INHALATION) 4 TIMES DAILY
Qty: 30 NEBULE | Refills: 0 | Status: SHIPPED | OUTPATIENT
Start: 2022-02-02

## 2022-02-02 RX ORDER — IPRATROPIUM BROMIDE AND ALBUTEROL SULFATE 2.5; .5 MG/3ML; MG/3ML
3 SOLUTION RESPIRATORY (INHALATION)
Status: DISCONTINUED | OUTPATIENT
Start: 2022-02-02 | End: 2022-02-02

## 2022-02-02 RX ADMIN — SODIUM CHLORIDE, PRESERVATIVE FREE 10 ML: 5 INJECTION INTRAVENOUS at 05:56

## 2022-02-02 RX ADMIN — RIVAROXABAN 15 MG: 15 TABLET, FILM COATED ORAL at 17:02

## 2022-02-02 RX ADMIN — Medication 5000 UNITS: at 08:47

## 2022-02-02 RX ADMIN — FAMOTIDINE 20 MG: 20 TABLET ORAL at 08:46

## 2022-02-02 RX ADMIN — Medication 3 UNITS: at 17:02

## 2022-02-02 RX ADMIN — METOPROLOL TARTRATE 12.5 MG: 25 TABLET, FILM COATED ORAL at 08:46

## 2022-02-02 RX ADMIN — IPRATROPIUM BROMIDE: 0.5 SOLUTION RESPIRATORY (INHALATION) at 23:27

## 2022-02-02 RX ADMIN — Medication 9 UNITS: at 12:46

## 2022-02-02 RX ADMIN — MIRTAZAPINE 7.5 MG: 15 TABLET, FILM COATED ORAL at 21:24

## 2022-02-02 RX ADMIN — RIVAROXABAN 15 MG: 15 TABLET, FILM COATED ORAL at 08:47

## 2022-02-02 RX ADMIN — ATORVASTATIN CALCIUM 20 MG: 20 TABLET, FILM COATED ORAL at 21:25

## 2022-02-02 RX ADMIN — Medication 3 UNITS: at 21:26

## 2022-02-02 RX ADMIN — SODIUM CHLORIDE, PRESERVATIVE FREE 10 ML: 5 INJECTION INTRAVENOUS at 21:28

## 2022-02-02 RX ADMIN — IPRATROPIUM BROMIDE AND ALBUTEROL 1 PUFF: 20; 100 SPRAY, METERED RESPIRATORY (INHALATION) at 08:47

## 2022-02-02 RX ADMIN — METOPROLOL TARTRATE 12.5 MG: 25 TABLET, FILM COATED ORAL at 21:25

## 2022-02-02 RX ADMIN — SODIUM CHLORIDE, PRESERVATIVE FREE 10 ML: 5 INJECTION INTRAVENOUS at 17:03

## 2022-02-02 RX ADMIN — IPRATROPIUM BROMIDE: 0.5 SOLUTION RESPIRATORY (INHALATION) at 19:31

## 2022-02-02 RX ADMIN — Medication 3 MG: at 21:24

## 2022-02-02 NOTE — PROGRESS NOTES
Problem: Mobility Impaired (Adult and Pediatric)  Goal: *Acute Goals and Plan of Care (Insert Text)  Description: Physical Therapy Goals  Initiated 1/18/2022, re-evaluated 1/25/22 and goals adjusted as needed and to be accomplished within 7 day(s), re-evaluated 1/31/22  1. Patient will move from supine to sit and sit to supine , scoot up and down, and roll side to side in bed with supervision. 2.  Patient will transfer from bed to chair and chair to bed with CGA using the least restrictive device. 3.  Patient will perform sit to stand with CGA  4. Patient will ambulate with Sheyla for 20 feet with the least restrictive device. 5.  Patient will ascend/descend 4 stairs with unilateral handrail(s) with minimal assistance/contact guard assist.    PLOF: Pt reporting he ambulates without AD, assist sometimes for bed mobility. Lives with wife in 1 story house with 4 YURIY. Outcome: Progressing Towards Goal     PHYSICAL THERAPY TREATMENT    Patient: Dustin Rosas (89 y.o. male)  Date: 2/2/2022  Diagnosis: COVID [U07.1]  Pneumonia due to COVID-19 virus [U07.1, J12.82] <principal problem not specified>       Precautions: Fall,Skin  PLOF: see above    ASSESSMENT:  Pt seen in bed in NAD, on 5L via NC at 97% at rest upon arrival. Pt agreeable to participate. Pt nurse informed of wanting to get pt OOB into chair and she donned NRB mask at 15L in prep for mobility. Pt needed min A trunk on R side this date to come to fully sitting EOB. Pt able to stand with min A, manual assist holding R hand on walker however pt unable to weight shift to be able to lift RLE off floor for taking a few steps, thus pt returned to sitting and recliner placed directly next to chair. Pt then stood and given HHA on the L to facilitate SPT with manual weight shifting however pt still with continued difficulty in trying to  the R foot, pt then given manual assist to move his R leg and lowered into chair.  Pt noted to have increased coughing with all mobility and needs cues for PLB. Pt de-satted into the 60s after mobility, increased back to 80 on the 15L NRB then re-donned the 5L NC and pt increased back up to 87% within 3 minutes. Pt emotionally labile during session with his performance. Pt set up with lunch tray and left with all needs met, chair alarm on. Will continue to recommend rehab at discharge. Progression toward goals:   [x]      Improving appropriately and progressing toward goals  []      Improving slowly and progressing toward goals  []      Not making progress toward goals and plan of care will be adjusted     PLAN:  Patient continues to benefit from skilled intervention to address the above impairments. Continue treatment per established plan of care. Discharge Recommendations:  Skilled Nursing Facility  Further Equipment Recommendations for Discharge:  wheel chair      SUBJECTIVE:   Patient stated Brittani Meraz will try.     OBJECTIVE DATA SUMMARY:   Critical Behavior:  Neurologic State: Alert  Orientation Level: Oriented to person,Oriented to place,Disoriented to situation,Disoriented to time  Cognition: Follows commands  Safety/Judgement: Fall prevention  Functional Mobility Training:  Bed Mobility:     Supine to Sit: Minimum assistance (at trunk to come to full sit )     Scooting: Contact guard assistance (forward on EOB )         Transfers:  Sit to Stand: Minimum assistance  Stand to Sit: Minimum assistance    Balance:  Sitting: Intact  Sitting - Static: Good (unsupported)  Sitting - Dynamic: Fair (occasional)  Standing: Impaired; With support  Standing - Static: Fair  Standing - Dynamic : Poor        Pain:  Pain level pre-treatment: 0/10  Pain level post-treatment: 0/10   Pain Intervention(s): Medication (see MAR); Rest, Ice, Repositioning   Response to intervention: Nurse notified    Activity Tolerance:   Fair    Please refer to the flowsheet for vital signs taken during this treatment.   After treatment:   [x] Patient left in no apparent distress sitting up in chair  [] Patient left in no apparent distress in bed  [x] Call bell left within reach  [x] Nursing notified  [] Caregiver present  [] Bed alarm activated  [] SCDs applied      COMMUNICATION/EDUCATION:   [x]         Role of Physical Therapy in the acute care setting. [x]         Fall prevention education was provided and the patient/caregiver indicated understanding. [x]         Patient/family have participated as able in working toward goals and plan of care. [x]         Patient/family agree to work toward stated goals and plan of care. []         Patient understands intent and goals of therapy, but is neutral about his/her participation.   []         Patient is unable to participate in stated goals/plan of care: ongoing with therapy staff.  []         Other:        Isaac Goldberg   Time Calculation: 25 mins

## 2022-02-02 NOTE — PROGRESS NOTES
Hospitalist Progress Note    Patient: Arnulfo Gordon Age: 46 y.o. : 1970 MR#: [id number] SSN: [id number]  Date/Time: 2022     DOA: 2022  PCP: Jenifer Waldrop MD    Subjective:     Patient continues to require oxygen. Denies any chest pain abdominal pain. No shortness of breath. No cough. No nausea and vomiting. Assessment/Plan:     1. Acute respiratory failure with hypoxia due to COVID-19 infection      2. COVID-19 pneumonia s/p treatment  3. MONICA on CKD3, associate with covid infection, resolving  4. Elevated troponin, demand ischemia,   5. Hyperglycemia with T2DM, HgbA1c 7.8%  6. Acute DVT on legs, associate hypercoagulable state due to covid   7. H/o stroke with right-sided hemiparesis   8. . Stage 2 pressure ulcer of the gluteal cleft, POA   9. Hypomagnesemia, better  10. Exertional hypoxia due to deconditioning from recent COVID-19 infection. PLAN:    Continue 3 L oxygen during rest and 5 L during exertion. Patient has exertional hypoxia due to deconditioning and recent Covid related lung disease. Pulmonology input noted. Continue incentive spirometry, Combivent and bronchial hygiene. Continuing Xarelto  Avoid nephrotoxic meds, nephrology is following  PT and OT to follow this patient  ARU has declined this patient. Patient can be discharged home if okay with wife otherwise can go to skilled nursing facility. If he goes home, he will be high risk for readmission. Case discussed with:  [x]Patient  [x]Family  [x]Nursing  [x]Case Management  DVT Prophylaxis:  []Lovenox  [x]Xarelto  []SCDs  []Coumadin   []On Heparin gtt    Total time to take care of this patient was 30 minutes and more than 50% of time was spent counseling and coordinating care.        Objective:   VS:   Visit Vitals  /63   Pulse 76   Temp 98.1 °F (36.7 °C)   Resp 18   Ht 5' 11\" (1.803 m)   Wt 94.3 kg (208 lb)   SpO2 95%   BMI 29.01 kg/m²      Tmax/24hrs: Temp (24hrs), Av °F (36.7 °C), Min:97.9 °F (36.6 °C), Max:98.1 °F (36.7 °C)      Intake/Output Summary (Last 24 hours) at 2/2/2022 0846  Last data filed at 2/1/2022 2000  Gross per 24 hour   Intake 480 ml   Output --   Net 480 ml       General:  Awake, follows commands, not in acute distress  Cardiovascular:  S1S2+, RRR  Pulmonary: Diminished breath sound bibasilar, no wheezes  GI:  Soft, BS+, NT, ND  Extremities:  No pedal edema  CNS: Moves left side very well.   Right-sided weakness      Current Facility-Administered Medications   Medication Dose Route Frequency    melatonin tablet 3 mg  3 mg Oral QHS    ipratropium-albuterol (COMBIVENT RESPIMAT) 20 mcg-100 mcg inhalation spray  1 Puff Inhalation QID RT    mirtazapine (REMERON) tablet 7.5 mg  7.5 mg Oral QHS    metoprolol tartrate (LOPRESSOR) tablet 12.5 mg  12.5 mg Oral Q12H    dextrose 10% infusion 125-250 mL  125-250 mL IntraVENous PRN    hydrALAZINE (APRESOLINE) 20 mg/mL injection 10 mg  10 mg IntraVENous Q6H PRN    rivaroxaban (XARELTO) tablet 15 mg  15 mg Oral BID WITH MEALS    sodium chloride (NS) flush 5-40 mL  5-40 mL IntraVENous Q8H    sodium chloride (NS) flush 5-40 mL  5-40 mL IntraVENous PRN    acetaminophen (TYLENOL) tablet 650 mg  650 mg Oral Q6H PRN    Or    acetaminophen (TYLENOL) suppository 650 mg  650 mg Rectal Q6H PRN    polyethylene glycol (MIRALAX) packet 17 g  17 g Oral DAILY PRN    ondansetron (ZOFRAN ODT) tablet 4 mg  4 mg Oral Q8H PRN    Or    ondansetron (ZOFRAN) injection 4 mg  4 mg IntraVENous Q6H PRN    insulin lispro (HUMALOG) injection   SubCUTAneous AC&HS    glucose chewable tablet 16 g  4 Tablet Oral PRN    glucagon (GLUCAGEN) injection 1 mg  1 mg IntraMUSCular PRN    famotidine (PEPCID) tablet 20 mg  20 mg Oral DAILY    cholecalciferol (VITAMIN D3) capsule 5,000 Units  5,000 Units Oral DAILY        Recent Results (from the past 24 hour(s))   GLUCOSE, POC    Collection Time: 02/01/22 11:14 AM   Result Value Ref Range    Glucose (POC) 189 (H) 70 - 110 mg/dL   GLUCOSE, POC    Collection Time: 02/01/22  3:09 PM   Result Value Ref Range    Glucose (POC) 179 (H) 70 - 110 mg/dL   GLUCOSE, POC    Collection Time: 02/01/22  8:52 PM   Result Value Ref Range    Glucose (POC) 276 (H) 70 - 110 mg/dL   RENAL FUNCTION PANEL    Collection Time: 02/02/22  3:59 AM   Result Value Ref Range    Sodium 138 136 - 145 mmol/L    Potassium 4.4 3.5 - 5.5 mmol/L    Chloride 106 100 - 111 mmol/L    CO2 29 21 - 32 mmol/L    Anion gap 3 3.0 - 18 mmol/L    Glucose 127 (H) 74 - 99 mg/dL    BUN 22 (H) 7.0 - 18 MG/DL    Creatinine 1.37 (H) 0.6 - 1.3 MG/DL    BUN/Creatinine ratio 16 12 - 20      GFR est AA >60 >60 ml/min/1.73m2    GFR est non-AA 55 (L) >60 ml/min/1.73m2    Calcium 8.9 8.5 - 10.1 MG/DL    Phosphorus 3.4 2.5 - 4.9 MG/DL    Albumin 2.2 (L) 3.4 - 5.0 g/dL   GLUCOSE, POC    Collection Time: 02/02/22  6:57 AM   Result Value Ref Range    Glucose (POC) 125 (H) 70 - 110 mg/dL     Disclaimer: Sections of this note are dictated using utilizing voice recognition software, which may have resulted in some phonetic based errors in grammar and contents. Even though attempts were made to correct all the mistakes, some may have been missed, and remained in the body of the document. If questions arise, please contact our department.

## 2022-02-02 NOTE — DISCHARGE SUMMARY
Physician Discharge Summary       Patient: Krystina Narvaez MRN: [id number]  SSN: [id number]    YOB: 1970  Age: 46 y.o. Sex: male    PCP: Marbella Marcos MD    Allergies: Grape    Admit date: 1/9/2022  Admitting Provider: Melissa Guerin MD    Discharge date: 2/4/2022  Discharging Provider: Ezra Byers MD    * Admission Diagnoses: COVID [U07.1]  Pneumonia due to COVID-19 virus [U07.1, J12.82]    * Discharge Diagnoses:      1. Acute respiratory failure with hypoxia due to COVID-19 infection      2. COVID-19 pneumonia s/p treatment  3. MONICA on CKD3, associate with covid infection, resolving  4. Elevated troponin, demand ischemia,   5. Hyperglycemia with T2DM, HgbA1c 7.8%  6. Acute DVT on legs, associate hypercoagulable state due to covid   7. H/o stroke with right-sided hemiparesis   8. . Stage 2 pressure ulcer of the gluteal cleft, POA   9. Hypomagnesemia, better  10. Exertional hypoxia due to deconditioning from recent COVID-19 infection. [facility] Hospital Course: Patient presented to hospital on February 2, 2022 with complaint of shortness of breath. He was diagnosed with Covid positive. Patient had chest x-ray done consistent with COVID-19 pneumonia. Patient was started on treatment for COVID-19 in form of steroid. Patient was subsequently found out to have DVT of lower extremity and started on Xarelto without any sign of bleeding. Patient had mildly elevated troponin that was thought secondary to demand ischemia due to hypoxia. Patient was also seen by nephrology service due to acute renal failure on stage III chronic kidney disease which has been stable now. Patient was maintained on his home medications for other comorbidities. Renal ultrasound was negative for any acute findings. Patient was seen by PT and OT recommended ARU versus SNF placement. Patient continues to have exertional hypoxia due to deconditioning and Covid related lung disease.   Patient was seen by pulmonology service here. He will require 3 L oxygen during rest and 5 L during exertion. It will be in patient's best interest, patient must be discharged to nursing home as he will be at high risk for readmission if he goes home. I have left this decision to patient's wife and  will handle it. * Procedures: None  * No surgery found *      Consults: Pulmonary/Intensive care, ID and Nephrology    Significant Diagnostic Studies: Chest x-rays, bilateral upper and lower extremity venous Doppler, renal ultrasound. Echocardiogram.    Discharge Exam:  Please refer my progress note from February 4, 2022 for further detail    * Discharge Condition: improved  * Disposition: [place] ([facility])    Discharge Medications:  Current Discharge Medication List      START taking these medications    Details   famotidine (PEPCID) 20 mg tablet Take 1 Tablet by mouth daily. Qty: 30 Tablet, Refills: 0  Start date: 2/3/2022      melatonin 3 mg tablet Take 1 Tablet by mouth nightly. Qty: 20 Tablet, Refills: 0  Start date: 2/2/2022      mirtazapine (REMERON) 7.5 mg tablet Take 1 Tablet by mouth nightly. Qty: 30 Tablet, Refills: 0  Start date: 2/2/2022      rivaroxaban (XARELTO) 20 mg tab tablet Take 1 Tablet by mouth daily (with breakfast) for 2 doses. Qty: 30 Tablet, Refills: 0  Start date: 2/2/2022, End date: 2/4/2022      acetaminophen (TYLENOL) 500 mg tablet Take 1 Tablet by mouth every six (6) hours as needed for Pain or Fever. Qty: 20 Tablet, Refills: 0  Start date: 2/2/2022      albuterol-ipratropium (DUO-NEB) 2.5 mg-0.5 mg/3 ml nebu 3 mL by Nebulization route four (4) times daily. Qty: 30 Nebule, Refills: 0  Start date: 2/2/2022      atorvastatin (LIPITOR) 20 mg tablet Take 2 Tablets by mouth nightly.   Qty: 30 Tablet, Refills: 0  Start date: 2/2/2022         CONTINUE these medications which have CHANGED    Details   metoprolol tartrate (LOPRESSOR) 25 mg tablet Take 0.5 Tablets by mouth every twelve (12) hours. Qty: 30 Tablet, Refills: 0  Start date: 2/2/2022         CONTINUE these medications which have NOT CHANGED    Details   SITagliptin-metFORMIN (JANUMET) 50-1,000 mg per tablet Take 1 Tab by mouth two (2) times daily (with meals). STOP taking these medications       amLODIPine-Olmesartan 10-40 mg tab Comments:   Reason for Stopping:         HYDROcodone-acetaminophen (NORCO) 5-325 mg per tablet Comments:   Reason for Stopping:               * Follow-up Care/Patient Instructions: Activity: PT/OT Eval and Treat  Diet: Cardiac Diet and Renal Diet  Wound Care: As directed    Follow-up Information     Follow up With Specialties Details Why Contact Info    Larraine Dandy, MD Nephrology In 4 weeks for ckd [address]      Yarely Moran, [address] [place] [address]  [phone]      Larraine Dandy, MD Nephrology  awaiting for office to call back for an 4 wks appt/time per md [address] [place] [address] [phone]          Follow-up Appointments   Procedures    FOLLOW UP VISIT Appointment in: Other ([address] [place] [address]) shelter doctor to follow With dr. Howie Fisher and group in 2-3 weeks for hypoxia     Nursing home doctor to follow  With dr. Howie Fisher and group in 2-3 weeks for hypoxia     Standing Status:   Standing     Number of Occurrences:   1     Order Specific Question:   Appointment in     Answer: Other (Specify)     Total time of discharge is greater than 35 minutes    Disclaimer: Sections of this note are dictated using utilizing voice recognition software, which may have resulted in some phonetic based errors in grammar and contents. Even though attempts were made to correct all the mistakes, some may have been missed, and remained in the body of the document. If questions arise, please contact our department.       Signed:  Grecia Lyon MD  2/4/2022

## 2022-02-02 NOTE — PROGRESS NOTES
helped the patient Alexander Mijares, who is a 46 y.o.,male, connect with the family Luly Valencia, spouse) with Z[employer] Video Connection. Assessment:  There are no further spiritual or Zoroastrianism issues which require Spiritual Care Services interventions at this time. Plan:  Chaplains will continue to follow and will provide pastoral care on an as needed/requested basis.  recommends bedside caregivers page  on duty if patient shows signs of acute spiritual or emotional distress. Tisha Hatchet, MDiv.   [facility]   [phone]

## 2022-02-02 NOTE — PROGRESS NOTES
Called [facility]U and spoke with Ely Garcia to review patient for possible acceptance.     Taina Amador RN

## 2022-02-02 NOTE — PROGRESS NOTES
Called and spoke with Christal Bernstein of [facility], states she is matching patient out to their [place] facilities. She will call me back with update.     Shahbaz Pierre RN

## 2022-02-03 LAB
ALBUMIN SERPL-MCNC: 2.1 G/DL (ref 3.4–5)
ANION GAP SERPL CALC-SCNC: 4 MMOL/L (ref 3–18)
BUN SERPL-MCNC: 22 MG/DL (ref 7–18)
BUN/CREAT SERPL: 17 (ref 12–20)
CALCIUM SERPL-MCNC: 8.8 MG/DL (ref 8.5–10.1)
CHLORIDE SERPL-SCNC: 108 MMOL/L (ref 100–111)
CO2 SERPL-SCNC: 28 MMOL/L (ref 21–32)
CREAT SERPL-MCNC: 1.32 MG/DL (ref 0.6–1.3)
GLUCOSE BLD STRIP.AUTO-MCNC: 119 MG/DL (ref 70–110)
GLUCOSE BLD STRIP.AUTO-MCNC: 146 MG/DL (ref 70–110)
GLUCOSE BLD STRIP.AUTO-MCNC: 181 MG/DL (ref 70–110)
GLUCOSE BLD STRIP.AUTO-MCNC: 204 MG/DL (ref 70–110)
GLUCOSE BLD STRIP.AUTO-MCNC: 222 MG/DL (ref 70–110)
GLUCOSE SERPL-MCNC: 147 MG/DL (ref 74–99)
PHOSPHATE SERPL-MCNC: 3.2 MG/DL (ref 2.5–4.9)
POTASSIUM SERPL-SCNC: 4.2 MMOL/L (ref 3.5–5.5)
SODIUM SERPL-SCNC: 140 MMOL/L (ref 136–145)

## 2022-02-03 PROCEDURE — 74011000250 HC RX REV CODE- 250: Performed by: HOSPITALIST

## 2022-02-03 PROCEDURE — 74011636637 HC RX REV CODE- 636/637: Performed by: INTERNAL MEDICINE

## 2022-02-03 PROCEDURE — 36415 COLL VENOUS BLD VENIPUNCTURE: CPT

## 2022-02-03 PROCEDURE — 99232 SBSQ HOSP IP/OBS MODERATE 35: CPT | Performed by: INTERNAL MEDICINE

## 2022-02-03 PROCEDURE — 2709999900 HC NON-CHARGEABLE SUPPLY

## 2022-02-03 PROCEDURE — 77030040392 HC DRSG OPTIFOAM MDII -A

## 2022-02-03 PROCEDURE — 80069 RENAL FUNCTION PANEL: CPT

## 2022-02-03 PROCEDURE — 65660000000 HC RM CCU STEPDOWN

## 2022-02-03 PROCEDURE — 94640 AIRWAY INHALATION TREATMENT: CPT

## 2022-02-03 PROCEDURE — 74011250637 HC RX REV CODE- 250/637: Performed by: FAMILY MEDICINE

## 2022-02-03 PROCEDURE — 82962 GLUCOSE BLOOD TEST: CPT

## 2022-02-03 PROCEDURE — 74011000250 HC RX REV CODE- 250: Performed by: INTERNAL MEDICINE

## 2022-02-03 PROCEDURE — 74011250637 HC RX REV CODE- 250/637: Performed by: INTERNAL MEDICINE

## 2022-02-03 RX ADMIN — Medication 3 MG: at 21:45

## 2022-02-03 RX ADMIN — FAMOTIDINE 20 MG: 20 TABLET ORAL at 08:31

## 2022-02-03 RX ADMIN — IPRATROPIUM BROMIDE 1 DOSE: 0.5 SOLUTION RESPIRATORY (INHALATION) at 15:14

## 2022-02-03 RX ADMIN — Medication 5000 UNITS: at 08:32

## 2022-02-03 RX ADMIN — Medication 6 UNITS: at 12:01

## 2022-02-03 RX ADMIN — MIRTAZAPINE 7.5 MG: 15 TABLET, FILM COATED ORAL at 21:45

## 2022-02-03 RX ADMIN — RIVAROXABAN 15 MG: 15 TABLET, FILM COATED ORAL at 08:32

## 2022-02-03 RX ADMIN — SODIUM CHLORIDE, PRESERVATIVE FREE 10 ML: 5 INJECTION INTRAVENOUS at 21:45

## 2022-02-03 RX ADMIN — IPRATROPIUM BROMIDE 1 DOSE: 0.5 SOLUTION RESPIRATORY (INHALATION) at 21:12

## 2022-02-03 RX ADMIN — METOPROLOL TARTRATE 12.5 MG: 25 TABLET, FILM COATED ORAL at 08:31

## 2022-02-03 RX ADMIN — METOPROLOL TARTRATE 12.5 MG: 25 TABLET, FILM COATED ORAL at 21:45

## 2022-02-03 RX ADMIN — ATORVASTATIN CALCIUM 20 MG: 20 TABLET, FILM COATED ORAL at 21:45

## 2022-02-03 RX ADMIN — IPRATROPIUM BROMIDE 1 DOSE: 0.5 SOLUTION RESPIRATORY (INHALATION) at 08:31

## 2022-02-03 RX ADMIN — IPRATROPIUM BROMIDE 1 DOSE: 0.5 SOLUTION RESPIRATORY (INHALATION) at 11:29

## 2022-02-03 RX ADMIN — SODIUM CHLORIDE, PRESERVATIVE FREE 10 ML: 5 INJECTION INTRAVENOUS at 05:32

## 2022-02-03 RX ADMIN — Medication 3 UNITS: at 21:44

## 2022-02-03 NOTE — PROGRESS NOTES
Nutrition Assessment (Disaster Mode)    Type and Reason for Visit: Reassess,Wound,RD nutrition re-screen/LOS    Nutrition Recommendations/Plan:   - Modify supplement: decrease Glucerna Shake to BID. Continue Deshawn BID  - Continue all other nutrition interventions. Encourage continued po intake of meals and supplements. Malnutrition Assessment:  Malnutrition Status: At risk for malnutrition (specify) (fair po intake since admission)    Nutrition Assessment:   Nutrition Assessment: Pt reported good appetite/ meal intake. Eating 100% of recent meals. Tolerating diet. Consuming/ likes nutrition supplements. Agreeable to having glucerna shakes frequency decreased to BID. Noted okay for discharge; awaiting placement. Noted gluteal fold pressure injury improving; was stage 3, now stage 2. Right buttocks pressure injury remain stage 2. Nutrition Related Findings: BM 2/2. No edema. Pertinent meds: vitamin D3, SSI, remeron     Total Energy Requirements (kcals/day): [id number] Energy Requirements Based On: Fredda Petit (x1-1.3) Weight Used for Energy Requirements:  (94.3 kg)  Total Protein Requirements (g/day): 117-131 Weight Used for Protein Requirements: Current (x1.25-1. 4)  Total Fluid Requirements (ml/day): 1874-7082 Method Used for Fluid Requirements: 1 ml/kcal    Current Nutrition Therapies:  ADULT ORAL NUTRITION SUPPLEMENT Lunch, Dinner; Wound Healing Supplement  ADULT DIET Regular; 4 carb choices (60 gm/meal); Low Fat/Low Chol/High Fiber/2 gm Na; Low Potassium (Less than 3000 mg/day); No Concentrated Sweets  ADULT ORAL NUTRITION SUPPLEMENT Breakfast, Lunch, Dinner; Diabetic Supplement     Anthropometric Measures:  Height: 5' 11\" (180.3 cm) Weight: 94.3 kg (208 lb) Body mass index is 29.01 kg/m².   Admission Body Weight: 240 lb 1.3 oz (pt stated)  Ideal Body Weight (lbs) (Calculated): 172 lbs Ideal Body Weight (Kg) (Calculated): 78 kg % IBW (Calculated): 126.1 %  Usual Body Weight: 108.9 kg (240 lb) (1/18/2019 per chart hx) % Weight Change (Calculated): -9.6            Nutrition Diagnosis:   · Inadequate oral intake related to early satiety (promotion of wound healing) as evidenced by intake 51-75%,intake 26-50% (previous days, but recently improving with good po intake)    · Increased nutrient needs related to inadequate protein-energy intake (promotion of wound healing) as evidenced by wounds (pressure injury)       Nutrition Interventions:   Food and/or Nutrient Delivery: Continue current diet  Nutrition Education/Counseling: No recommendations at this time,Education not indicated  Coordination of Nutrition Care: Continue to monitor while inpatient    Previous Goal Met: Progressing toward goal(s)  Goals: PO nutrition intake will meet >75% of patient's estimated nutrition needs within the next follow up date    Nutrition Monitoring and Evaluation: Patient will be monitored per nutrition standards of care. Consult Dietitian if nutrition intervention essential to patient care is needed. Behavioral-Environmental Outcomes: None identified  Food/Nutrient Intake Outcomes: Food and nutrient intake,Supplement intake,Vitamin/mineral intake  Physical Signs/Symptoms Outcomes: Biochemical data,Meal time behavior,Nutrition focused physical findings,Skin    Discharge Planning:  Too soon to determine    Eric Lan RD on 2/3/2022 at 1:55 PM  Contact: [phone]    Disaster Mode Active

## 2022-02-03 NOTE — PROGRESS NOTES
Case Management arranged  transportation to [facility]. Address is [address] [place] [address]  and phone number is [phone]  Patient will require BLS transport. Pt requires Stretcher If stretcher, reason: Covid, HTN, DM  Patient is currently requiring oxygen Yes   Height:5'11   Weight: 208  Pt is on isolation: No    Is the pt ready now? yes  Requested time: Next Available  PCS Faxed: N/A  Insurance verified on face sheet: yes  Auth needed for transport: yes  CM completed PCS/ Envelope and placed on chart. Called to schedule transport to SNF, the earliest transportation time will be 9am tomorrow morning.    Ref # [id number]    Jess Ortiz RN

## 2022-02-03 NOTE — DIABETES MGMT
Diabetes Patient/Family Education Record    Factors That May Influence Patients Ability to Learn or Comply with Recommendations   []   Language barrier    []   Cultural needs   []   Motivation    []   Cognitive limitation    []   Physical   [x]   Education    []   Physiological factors   []   Hearing/vision/speaking impairment   []   Sabianist beliefs    []   Financial factors   []  Other:   []  No factors identified at this time. Person Instructed:   [x]   Patient   []   Family   []  Other     Preference for Learning:   [x]   Verbal   [x]   Written: diab educ packet; contents explained/discussed     [x]  Demonstration     Level of Comprehension & Competence:    []  Good                                      [] Fair                                     []  Poor                             []  Needs Reinforcement   []  Teach back completed    Education Component: Seen patient prior to discharge this afternoon. [x]  Medication management, including how to administer insulin (if appropriate) and potential medication interactions:  Noted prescription for patient to continue taking Janumet  mg 2 times daily with meals. Educated patient on medication. [x]  Nutritional management - [x] Obtained usual meal pattern: Patient receptive the following education:    [x]   Basic carbohydrate counting  [x]  Plate method  [x]  Limit concentrated sweets and avoid sweetened beverages  [x]  Portion control  []    Avoid skipping meals   []  Exercise   [x]  Signs, symptoms, and treatment of hyperglycemia and hypoglycemia: Discussed high blood glucose with patient and encouraged him to follow his diabetes treatment plan to help treat/prevent high blood sugar. Encouraged patient to call his doctor if he experience high blood sugar in the 300's that he cannot explain. Discussed symptoms of high blood sugar. Educated patient on how to use a BG meter.      [x] Prevention, recognition and treatment of hyperglycemia and hypoglycemia: Discussed low blood glucose less than 70 with patient including how to prevent, treat and when to contact his doctor. [x]  Importance of blood glucose monitoring  [x] Blood Glucose targets: fasting blood sugar range is between  range    [x]   Provided patient with blood glucose meter: Sample meter, Contour next EZ, with 10 lancets and 10 test strips. Patient will need prescriptions for additional testing supplies. Also informed patient about generic meters available in local pharmacy which are over the counter including [facility] at [address] [facility]. Cost of meter: $9.00  Cost of 50 count test strips: $9.00  Cost of 100 count lancets: $1.56  []  Has glucometer and supplies at home   [x]  Instruction on use of the blood glucose meter and recommended monitoring schedule: Patient reported that he has never performed home BG monitoring before. Educated patient about the value of checking his blood sugar at home and he was receptive during education and instructions. Patient stated that he lives with his wife at home who will be able to assist him. []  Discuss the importance of HbA1C monitoring. Patients A1c is ___ %. This is equivalent to average glucose of ___ mg/dl for the past 2-3 months.   []  Sick day guidelines   [x]  Proper use and disposal of lancets, needles, syringes or insulin pens (if appropriate)   []  Potential long-term complications (retinopathy, kidney disease, neuropathy, foot care)   [x] Information about whom to contact in case of emergency or for more information:   1.) call his primary care doctor as needed if he has any concerns and concerns about diabetes. 2.) call me at [phone] if he needs further education or if his wife is interested.  I work Monday-Friday from 8 AM to 4:30 PM. Leave voice message on the phone if I'm not available to answer hi call and I will return it as soon as possible.    []  Goal: Patient/family will demonstrate understanding of Diabetes Self- Management Skills by: 2/10/2022  Plan for post-discharge education or self-management support:    [] Outpatient class schedule provided            [] Patient Declined    [] Scheduled for outpatient classes (date) _______    [x] Written information provided  Verify: [x] Prior to admission Diabetes medications    Does patient understand how diabetes medications work? No. Educated patient. Does patient have difficulty obtaining diabetes medications and testing supplies?  No, with diabetes meds and he has never performed home BG monitoring before    Kristyn Cam RN [facility]  Pager: [phone]

## 2022-02-03 NOTE — PROGRESS NOTES
Spoke with [facility], states they can accept patient to [facility]. Will arrange transportation.     Isidoro Bowens RN

## 2022-02-03 NOTE — ROUTINE PROCESS
Bedside and Verbal shift change report given to Sylvia Nava LPN (oncoming nurse) by Florencia Christianson RN   (offgoing nurse). Report included the following information SBAR, Kardex, Intake/Output, MAR and Recent Results.

## 2022-02-03 NOTE — PROGRESS NOTES
Hospitalist Progress Note    Patient: Nori Wakefield Age: 46 y.o. : 1970 MR#: [id number] SSN: [id number]  Date/Time: 2/3/2022     DOA: 2022  PCP: Ricky Fairbanks MD    Subjective:     Patient does not have any new complaint overnight. Denies any chest pain abdominal pain. No new shortness of breath or cough currently. Assessment/Plan:     1. Acute respiratory failure with hypoxia due to COVID-19 infection, slowly improving. 2.  COVID-19 pneumonia s/p treatment  3. MONICA on CKD3, associate with covid infection, resolving  4. Elevated troponin, demand ischemia,   5. Hyperglycemia with T2DM, HgbA1c 7.8%  6. Acute DVT on legs, associate hypercoagulable state due to covid   7. H/o stroke with right-sided hemiparesis   8. . Stage 2 pressure ulcer of the gluteal cleft, POA   9. Hypomagnesemia, better  10. Exertional hypoxia due to deconditioning from recent COVID-19 infection. PLAN:    Continue 3 L oxygen during rest and 5 L during exertion. Patient has exertional hypoxia due to deconditioning and recent Covid related lung disease. Pulmonology input noted. Continue incentive spirometry, Combivent and bronchial hygiene. Continuing Xarelto  Avoid nephrotoxic meds, nephrology is following  PT and OT to follow this patient  Skilled nursing home placement today or tomorrow. Patient is otherwise medically stable. Case discussed with:  [x]Patient  []Family  [x]Nursing  [x]Case Management  DVT Prophylaxis:  []Lovenox  [x]Xarelto  []SCDs  []Coumadin   []On Heparin gtt    Total time to take care of this patient was 30 minutes and more than 50% of time was spent counseling and coordinating care.        Objective:   VS:   Visit Vitals  /82 (BP 1 Location: Left upper arm, BP Patient Position: At rest;Semi fowlers)   Pulse 75   Temp 98 °F (36.7 °C)   Resp 20   Ht 5' 11\" (1.803 m)   Wt 94.3 kg (208 lb)   SpO2 97%   BMI 29.01 kg/m²      Tmax/24hrs: Temp (24hrs), Av.4 °F (36.9 °C), Min:97.9 °F (36.6 °C), Max:99.4 °F (37.4 °C)      Intake/Output Summary (Last 24 hours) at 2/3/2022 1234  Last data filed at 2/3/2022 7297  Gross per 24 hour   Intake 240 ml   Output --   Net 240 ml       General:  Awake, follows commands, not in acute distress  Cardiovascular:  S1S2+, RRR  Pulmonary: Diminished breath sound bibasilar, no wheezes  GI:  Soft, BS+, NT, ND  Extremities:  No pedal edema  CNS: Moves left side very well.   Right-sided weakness      Current Facility-Administered Medications   Medication Dose Route Frequency    atorvastatin (LIPITOR) tablet 20 mg  20 mg Oral QHS    albuterol/ipratropium (DUONEB) neb solution  1 Dose Nebulization Q4H RT    melatonin tablet 3 mg  3 mg Oral QHS    mirtazapine (REMERON) tablet 7.5 mg  7.5 mg Oral QHS    metoprolol tartrate (LOPRESSOR) tablet 12.5 mg  12.5 mg Oral Q12H    dextrose 10% infusion 125-250 mL  125-250 mL IntraVENous PRN    hydrALAZINE (APRESOLINE) 20 mg/mL injection 10 mg  10 mg IntraVENous Q6H PRN    sodium chloride (NS) flush 5-40 mL  5-40 mL IntraVENous Q8H    sodium chloride (NS) flush 5-40 mL  5-40 mL IntraVENous PRN    acetaminophen (TYLENOL) tablet 650 mg  650 mg Oral Q6H PRN    Or    acetaminophen (TYLENOL) suppository 650 mg  650 mg Rectal Q6H PRN    polyethylene glycol (MIRALAX) packet 17 g  17 g Oral DAILY PRN    ondansetron (ZOFRAN ODT) tablet 4 mg  4 mg Oral Q8H PRN    Or    ondansetron (ZOFRAN) injection 4 mg  4 mg IntraVENous Q6H PRN    insulin lispro (HUMALOG) injection   SubCUTAneous AC&HS    glucose chewable tablet 16 g  4 Tablet Oral PRN    glucagon (GLUCAGEN) injection 1 mg  1 mg IntraMUSCular PRN    famotidine (PEPCID) tablet 20 mg  20 mg Oral DAILY    cholecalciferol (VITAMIN D3) capsule 5,000 Units  5,000 Units Oral DAILY        Recent Results (from the past 24 hour(s))   GLUCOSE, POC    Collection Time: 02/02/22 12:33 PM   Result Value Ref Range    Glucose (POC) 264 (H) 70 - 110 mg/dL   GLUCOSE, POC Collection Time: 02/02/22  4:19 PM   Result Value Ref Range    Glucose (POC) 197 (H) 70 - 110 mg/dL   GLUCOSE, POC    Collection Time: 02/02/22  9:22 PM   Result Value Ref Range    Glucose (POC) 171 (H) 70 - 110 mg/dL   RENAL FUNCTION PANEL    Collection Time: 02/03/22  3:37 AM   Result Value Ref Range    Sodium 140 136 - 145 mmol/L    Potassium 4.2 3.5 - 5.5 mmol/L    Chloride 108 100 - 111 mmol/L    CO2 28 21 - 32 mmol/L    Anion gap 4 3.0 - 18 mmol/L    Glucose 147 (H) 74 - 99 mg/dL    BUN 22 (H) 7.0 - 18 MG/DL    Creatinine 1.32 (H) 0.6 - 1.3 MG/DL    BUN/Creatinine ratio 17 12 - 20      GFR est AA >60 >60 ml/min/1.73m2    GFR est non-AA 57 (L) >60 ml/min/1.73m2    Calcium 8.8 8.5 - 10.1 MG/DL    Phosphorus 3.2 2.5 - 4.9 MG/DL    Albumin 2.1 (L) 3.4 - 5.0 g/dL   GLUCOSE, POC    Collection Time: 02/03/22  8:04 AM   Result Value Ref Range    Glucose (POC) 146 (H) 70 - 110 mg/dL     Disclaimer: Sections of this note are dictated using utilizing voice recognition software, which may have resulted in some phonetic based errors in grammar and contents. Even though attempts were made to correct all the mistakes, some may have been missed, and remained in the body of the document. If questions arise, please contact our department.

## 2022-02-04 VITALS
HEIGHT: 71 IN | RESPIRATION RATE: 19 BRPM | BODY MASS INDEX: 28.98 KG/M2 | SYSTOLIC BLOOD PRESSURE: 107 MMHG | DIASTOLIC BLOOD PRESSURE: 73 MMHG | TEMPERATURE: 98.9 F | OXYGEN SATURATION: 98 % | HEART RATE: 80 BPM | WEIGHT: 207 LBS

## 2022-02-04 LAB
ALBUMIN SERPL-MCNC: 2.2 G/DL (ref 3.4–5)
ANION GAP SERPL CALC-SCNC: 5 MMOL/L (ref 3–18)
BUN SERPL-MCNC: 17 MG/DL (ref 7–18)
BUN/CREAT SERPL: 13 (ref 12–20)
CALCIUM SERPL-MCNC: 9.1 MG/DL (ref 8.5–10.1)
CHLORIDE SERPL-SCNC: 107 MMOL/L (ref 100–111)
CO2 SERPL-SCNC: 28 MMOL/L (ref 21–32)
CREAT SERPL-MCNC: 1.26 MG/DL (ref 0.6–1.3)
GLUCOSE BLD STRIP.AUTO-MCNC: 118 MG/DL (ref 70–110)
GLUCOSE SERPL-MCNC: 181 MG/DL (ref 74–99)
PHOSPHATE SERPL-MCNC: 3.4 MG/DL (ref 2.5–4.9)
POTASSIUM SERPL-SCNC: 4.4 MMOL/L (ref 3.5–5.5)
SODIUM SERPL-SCNC: 140 MMOL/L (ref 136–145)

## 2022-02-04 PROCEDURE — 80069 RENAL FUNCTION PANEL: CPT

## 2022-02-04 PROCEDURE — 77010033678 HC OXYGEN DAILY

## 2022-02-04 PROCEDURE — 2709999900 HC NON-CHARGEABLE SUPPLY

## 2022-02-04 PROCEDURE — 36415 COLL VENOUS BLD VENIPUNCTURE: CPT

## 2022-02-04 PROCEDURE — 74011000250 HC RX REV CODE- 250: Performed by: HOSPITALIST

## 2022-02-04 PROCEDURE — 74011250637 HC RX REV CODE- 250/637: Performed by: INTERNAL MEDICINE

## 2022-02-04 PROCEDURE — 99239 HOSP IP/OBS DSCHRG MGMT >30: CPT | Performed by: INTERNAL MEDICINE

## 2022-02-04 PROCEDURE — 82962 GLUCOSE BLOOD TEST: CPT

## 2022-02-04 RX ADMIN — Medication 5000 UNITS: at 08:51

## 2022-02-04 RX ADMIN — FAMOTIDINE 20 MG: 20 TABLET ORAL at 08:51

## 2022-02-04 RX ADMIN — METOPROLOL TARTRATE 12.5 MG: 25 TABLET, FILM COATED ORAL at 08:51

## 2022-02-04 RX ADMIN — SODIUM CHLORIDE, PRESERVATIVE FREE 10 ML: 5 INJECTION INTRAVENOUS at 05:51

## 2022-02-04 NOTE — PROGRESS NOTES
Transition of Care Plan to SNF/Rehab    SNF/Rehab Transition:  Patient has been accepted to [facility] and meets criteria for admission. Patient will  be transported by medical tranpsort and expected to leave at 9am.    Communication to Patient/Family:  Met with patient and wife (identified care giver) and they are agreeable to the transition plan. Communication to SNF/Rehab:  Bedside RN, Gladis Lopez, has been notified of the transition plan to the facility and to call report (phone number [phone]). Discharge information has been updated on the AVS. And communicated to facility via [facility], or CC link. SNF/Rehab Transition:    PCP/Specialist:     Nursing Please include all hard scripts for controlled substances, med rec and dc summary, and AVS in packet.      Reviewed and confirmed with facility representative, Charmaine   at [facility] they can manage the patient care needs for the following:     Preeti Sidhu with (X) only those applicable:    COVID Status  Patient has had the Covid vaccine   Patient is C Negative        Medication:  [x]  Medications will be available at the facility  []  IV Antibiotics   []  Controlled Substance - hard copy to be sent with patient   []  Weekly Labs    Documents:  [] Hard RX  Number sent   [] MAR  [] Kardex  [] AVS  [] Wound care note  [x]Transfer Summary/Discharge Summary    Equipment:  []  CPAP/BiPAP  []  Wound Vacuum  []  Dubose or Urinary Device  []  PICC/Central Line  []  Nebulizer  []  Ventilator    Treatment:  []Isolation (for MRSA, VRE, etc.)  []Surgical Drain Management  []Tracheostomy Care  []Dressing Changes  []Dialysis with transportation and chair time  Center Mode of tranpsort   []PEG Care  []Oxygen  []Daily Weights for Heart Failure    Dietary:  []Any diet limitations  []Tube Feedings   []Total Parenteral Management (TPN)    Eligible for Medicaid Long Term Services and Supports  Yes:  [] Eligible for medical assistance or will become eligible within 180 days and LTSS completed. [] Provider/Patient and/or support system has requested screening. [x] LTSS copy provided to patient or responsible party, .  [] LTSS unavailable at discharge will send once processed to SNF provider.  [] LTSS  unavailable at discharged mailed to patient  [] LTSS performed by outside agency  on  with tracking number   No:   [] Private pay and is not financially eligible for Medicaid within the next 180 days. [] Reside out-of-state.   [] A residents of a state owned/operated facility that is licensed  by [address] [facility] [place] or [facility]  [] Enrollment in [facility] hospice services  [] [address] [place] [address] [facility]  [] Patient /Family declines to have screening completed or provide financial information for screening          Financial Resources:  Medicaid    [] Initiated and application pending   [] Full coverage      Advanced Care Plan:  []Surrogate Decision Maker of Care  []POA  []Communicated Code Status/ Full code    Other    Tamiko Ramsay RN

## 2022-02-04 NOTE — PROGRESS NOTES
Attempted to call report to [facility]. Call was disconnected x2. Medical Transport here for patient.

## 2022-02-04 NOTE — ROUTINE PROCESS
Bedside and Verbal shift change report given to Ban Hightower (oncoming nurse) by Celestina Stark RN   (offgoing nurse). Report included the following information SBAR, Kardex, Intake/Output, MAR and Recent Results.

## 2022-02-04 NOTE — DISCHARGE INSTRUCTIONS
Patient armband removed and shredded    DISCHARGE SUMMARY from Nurse    PATIENT INSTRUCTIONS:    After general anesthesia or intravenous sedation, for 24 hours or while taking prescription Narcotics:  · Limit your activities  · Do not drive and operate hazardous machinery  · Do not make important personal or business decisions  · Do  not drink alcoholic beverages  · If you have not urinated within 8 hours after discharge, please contact your surgeon on call. Report the following to your surgeon:  · Excessive pain, swelling, redness or odor of or around the surgical area  · Temperature over 100.5  · Nausea and vomiting lasting longer than 4 hours or if unable to take medications  · Any signs of decreased circulation or nerve impairment to extremity: change in color, persistent  numbness, tingling, coldness or increase pain  · Any questions    What to do at Home:  Recommended activity: Activity as tolerated. If you experience any of the following symptoms chest pain, shortness of breath, please follow up with health care provider. *  Please give a list of your current medications to your Primary Care Provider. *  Please update this list whenever your medications are discontinued, doses are      changed, or new medications (including over-the-counter products) are added. *  Please carry medication information at all times in case of emergency situations. These are general instructions for a healthy lifestyle:    No smoking/ No tobacco products/ Avoid exposure to second hand smoke  Surgeon General's Warning:  Quitting smoking now greatly reduces serious risk to your health.     Obesity, smoking, and sedentary lifestyle greatly increases your risk for illness    A healthy diet, regular physical exercise & weight monitoring are important for maintaining a healthy lifestyle    You may be retaining fluid if you have a history of heart failure or if you experience any of the following symptoms:  Weight gain of 3 pounds or more overnight or 5 pounds in a week, increased swelling in our hands or feet or shortness of breath while lying flat in bed. Please call your doctor as soon as you notice any of these symptoms; do not wait until your next office visit. The discharge information has been reviewed with the caregiver. The caregiver verbalized understanding. Discharge medications reviewed with the caregiver  Patient Education        8 Things You Can Do to Help Prevent Blood Clots  A blood clot in a deep vein, usually in the legs, is called a deep vein thrombosis (DVT). A DVT can break loose and travel through the bloodstream to the lungs. Then the clot can block blood flow in the lungs (pulmonary embolism). This can be life-threatening. That's why it's important to take steps to prevent a clot. Take a blood-thinning medicine (called an anticoagulant), if prescribed. If your doctor prescribes medicine, take it as directed. Exercise your lower leg muscles. This helps keep the blood moving through your legs. Pump your feet up and down by pulling your toes up toward your knees then pointing them down. Repeat. This is a good exercise to do when you are sitting for long periods of time. Get up out of bed as soon as your doctor says it's okay. Being active is one of the best things you can do to prevent clots. Take plenty of breaks when you travel. On long car trips, stop the car and walk around every hour or so. On the bus, plane, or train, get out of your seat and walk up and down the aisle every hour, if you can. Be active. Try to get 30 minutes or more of activity on most days of the week. Don't smoke. Smoking can increase your risk of blood clots. If you need help quitting, talk to your doctor about stop-smoking programs and medicines. Check with your doctor about whether you should use hormone forms of birth control or hormone therapy.   These may increase your risk of blood clots.     Use compression stockings if your doctor prescribes them. These are specially fitted stockings that may prevent blood clots by keeping blood from pooling in your legs. Current as of: July 6, 2021               Content Version: 13.0  © 2006-2021 [employer]. Care instructions adapted under license by [employer] (which disclaims liability or warranty for this information). If you have questions about a medical condition or this instruction, always ask your healthcare professional. [address] any warranty or liability for your use of this information. Patient Education        COVID-19 Antibody Test: About This Test  What is it? An antibody test looks for antibodies in the blood. These are proteins that your immune system makes, usually after you're exposed to germs like viruses or bacteria or after you get a vaccine. Antibodies work to fight illness. A COVID-19 antibody test looks for antibodies to SARS-CoV-2, the virus that causes COVID-19. If you test positive for these antibodies, it could mean that you already had COVID-19 or that you've been vaccinated for COVID-19. Why is it done? This test can be used to diagnose a past infection with the virus that causes COVID-19. Many people who get COVID-19 never have symptoms or have only mild ones. Without antibody testing, these people might never know that they already had the virus. Even if the test shows that you may have had COVID-19, you need to keep taking steps to protect yourself and others from the virus. Having COVID-19 in the past may not prevent you from getting it again. Antibody testing is important because:  · It could show who has already had COVID-19. · It could show who hasn't had the infection. · It helps experts who are tracking COVID-19 learn more about the virus and how it spreads. Talk to your doctor about what the test results mean for you.   How do you prepare for the test?  You don't need to do anything to prepare for this test. But be sure to follow any instructions your health care provider gives you. How is it done? This is a blood test. A health professional may prick your finger or use a needle to take a sample of blood from your arm. What do your results mean? The result is either positive or negative. A positive result means antibodies to SARS-CoV-2 were found. You probably already had COVID-19 or had a COVID-19 vaccine. But:  · You could get a \"false-positive\" result. The test might show that you have COVID-19 antibodies when you don't. The test may find antibodies that formed in response to another type of coronavirus. · It's not certain that having these antibodies will protect you from getting COVID-19 again. And if it does, it's not clear how long the protection lasts. A negative result means that these antibodies were not found. · You could get a \"false-negative\" result. It takes a while after you're infected or vaccinated for your immune system to make antibodies. · You could have a negative result but be infected now. You'd need a different test (viral test) to know if you have COVID-19 now. Where can you learn more? Go to http://www.gray.com/  Enter A128 in the search box to learn more about \"COVID-19 Antibody Test: About This Test.\"  Current as of: March 26, 2021               Content Version: 13.0  © 8033-4579 Healthwise, [facility]. Care instructions adapted under license by [employer] (which disclaims liability or warranty for this information). If you have questions about a medical condition or this instruction, always ask your healthcare professional. [place] [address] any warranty or liability for your use of this information.         and appropriate educational materials and side effects teaching were provided.   ___________________________________________________________________________________________________________________________________

## 2022-02-04 NOTE — PROGRESS NOTES
Hospitalist Progress Note    Patient: Any Rebolledo Age: 46 y.o. : 1970 MR#: [id number] SSN: [id number]  Date/Time: 2022     DOA: 2022  PCP: Juanita Anne MD    Subjective:     Patient was seen in presence of medical transport and nursing. Patient feels much better denies any chest pain abdominal pain. No nausea vomiting. He is happy that he is going out of hospital today. Assessment/Plan:     1. Acute respiratory failure with hypoxia due to COVID-19 infection, slowly improving. 2.  COVID-19 pneumonia s/p treatment  3. MONICA on CKD3, associate with covid infection, resolving  4. Elevated troponin, demand ischemia,   5. Hyperglycemia with T2DM, HgbA1c 7.8%  6. Acute DVT on legs, associate hypercoagulable state due to covid   7. H/o stroke with right-sided hemiparesis   8. . Stage 2 pressure ulcer of the gluteal cleft, POA   9. Hypomagnesemia, better  10. Exertional hypoxia due to deconditioning from recent COVID-19 infection. PLAN:    Continue 3 L oxygen during rest and 5 L during exertion. Patient has exertional hypoxia due to deconditioning and recent Covid related lung disease. Pulmonology input noted. Continue incentive spirometry, Combivent and bronchial hygiene. Continuing Xarelto. No overt sign or symptoms of bleeding. Avoid nephrotoxic meds, nephrology is following  PT and OT to follow this patient at rehab. Skilled nursing home placement today     Case discussed with:  [x]Patient  []Family  [x]Nursing  [x]Case Management  DVT Prophylaxis:  []Lovenox  [x]Xarelto  []SCDs  []Coumadin   []On Heparin gtt    Total time to take care of this patient was 30 minutes and more than 50% of time was spent counseling and coordinating care.        Objective:   VS:   Visit Vitals  /89   Pulse 69   Temp 99 °F (37.2 °C)   Resp 20   Ht 5' 11\" (1.803 m)   Wt 93.9 kg (207 lb)   SpO2 97%   BMI 28.87 kg/m²      Tmax/24hrs: Temp (24hrs), Av.5 °F (36.9 °C), Min:97.5 °F (36.4 °C), Max:99 °F (37.2 °C)      Intake/Output Summary (Last 24 hours) at 2/4/2022 1121  Last data filed at 2/3/2022 2145  Gross per 24 hour   Intake 540 ml   Output 150 ml   Net 390 ml       General:  Awake, follows commands, not in acute distress  Cardiovascular:  S1S2+, RRR  Pulmonary: Diminished breath sound bibasilar, no wheezes  GI:  Soft, BS+, NT, ND  Extremities:  No pedal edema  CNS: Moves left side very well.   Right-sided weakness      Current Facility-Administered Medications   Medication Dose Route Frequency    atorvastatin (LIPITOR) tablet 20 mg  20 mg Oral QHS    albuterol/ipratropium (DUONEB) neb solution  1 Dose Nebulization Q4H RT    melatonin tablet 3 mg  3 mg Oral QHS    mirtazapine (REMERON) tablet 7.5 mg  7.5 mg Oral QHS    metoprolol tartrate (LOPRESSOR) tablet 12.5 mg  12.5 mg Oral Q12H    dextrose 10% infusion 125-250 mL  125-250 mL IntraVENous PRN    hydrALAZINE (APRESOLINE) 20 mg/mL injection 10 mg  10 mg IntraVENous Q6H PRN    sodium chloride (NS) flush 5-40 mL  5-40 mL IntraVENous Q8H    sodium chloride (NS) flush 5-40 mL  5-40 mL IntraVENous PRN    acetaminophen (TYLENOL) tablet 650 mg  650 mg Oral Q6H PRN    Or    acetaminophen (TYLENOL) suppository 650 mg  650 mg Rectal Q6H PRN    polyethylene glycol (MIRALAX) packet 17 g  17 g Oral DAILY PRN    ondansetron (ZOFRAN ODT) tablet 4 mg  4 mg Oral Q8H PRN    Or    ondansetron (ZOFRAN) injection 4 mg  4 mg IntraVENous Q6H PRN    insulin lispro (HUMALOG) injection   SubCUTAneous AC&HS    glucose chewable tablet 16 g  4 Tablet Oral PRN    glucagon (GLUCAGEN) injection 1 mg  1 mg IntraMUSCular PRN    famotidine (PEPCID) tablet 20 mg  20 mg Oral DAILY    cholecalciferol (VITAMIN D3) capsule 5,000 Units  5,000 Units Oral DAILY        Recent Results (from the past 24 hour(s))   GLUCOSE, POC    Collection Time: 02/03/22 11:47 AM   Result Value Ref Range    Glucose (POC) 204 (H) 70 - 110 mg/dL   GLUCOSE, POC    Collection Time: 02/03/22 11:49 AM   Result Value Ref Range    Glucose (POC) 222 (H) 70 - 110 mg/dL   GLUCOSE, POC    Collection Time: 02/03/22  5:02 PM   Result Value Ref Range    Glucose (POC) 119 (H) 70 - 110 mg/dL   GLUCOSE, POC    Collection Time: 02/03/22  9:10 PM   Result Value Ref Range    Glucose (POC) 181 (H) 70 - 110 mg/dL   RENAL FUNCTION PANEL    Collection Time: 02/04/22  2:36 AM   Result Value Ref Range    Sodium 140 136 - 145 mmol/L    Potassium 4.4 3.5 - 5.5 mmol/L    Chloride 107 100 - 111 mmol/L    CO2 28 21 - 32 mmol/L    Anion gap 5 3.0 - 18 mmol/L    Glucose 181 (H) 74 - 99 mg/dL    BUN 17 7.0 - 18 MG/DL    Creatinine 1.26 0.6 - 1.3 MG/DL    BUN/Creatinine ratio 13 12 - 20      GFR est AA >60 >60 ml/min/1.73m2    GFR est non-AA >60 >60 ml/min/1.73m2    Calcium 9.1 8.5 - 10.1 MG/DL    Phosphorus 3.4 2.5 - 4.9 MG/DL    Albumin 2.2 (L) 3.4 - 5.0 g/dL   GLUCOSE, POC    Collection Time: 02/04/22  6:52 AM   Result Value Ref Range    Glucose (POC) 118 (H) 70 - 110 mg/dL     Disclaimer: Sections of this note are dictated using utilizing voice recognition software, which may have resulted in some phonetic based errors in grammar and contents. Even though attempts were made to correct all the mistakes, some may have been missed, and remained in the body of the document. If questions arise, please contact our department.

## 2022-02-05 NOTE — PROGRESS NOTES
Pt's spouse called to speak to . pt was transferred to [facility] this morning according to previous notes. Spouse reports calling to communicate her wishes to SNF location selected. Mrs. Alexandria Wood was very upset no one communicated with her in regards to husbands discharge.

## 2022-03-18 PROBLEM — N17.9 AKI (ACUTE KIDNEY INJURY) (HCC): Status: ACTIVE | Noted: 2022-01-11

## 2022-03-18 PROBLEM — D68.69 HYPERCOAGULABLE STATE ASSOCIATED WITH COVID-19 (HCC): Status: ACTIVE | Noted: 2022-01-11

## 2022-03-18 PROBLEM — U07.1 HYPERCOAGULABLE STATE ASSOCIATED WITH COVID-19 (HCC): Status: ACTIVE | Noted: 2022-01-11

## 2022-03-19 PROBLEM — M62.82 NON-TRAUMATIC RHABDOMYOLYSIS: Status: ACTIVE | Noted: 2022-01-12

## 2022-03-19 PROBLEM — J96.01 ACUTE RESPIRATORY FAILURE WITH HYPOXIA (HCC): Status: ACTIVE | Noted: 2022-01-11

## 2022-03-20 PROBLEM — J12.82 PNEUMONIA DUE TO COVID-19 VIRUS: Status: ACTIVE | Noted: 2022-01-10

## 2022-03-20 PROBLEM — U07.1 PNEUMONIA DUE TO COVID-19 VIRUS: Status: ACTIVE | Noted: 2022-01-10

## 2022-03-20 PROBLEM — I82.403 ACUTE DEEP VEIN THROMBOSIS (DVT) OF BOTH LOWER EXTREMITIES (HCC): Status: ACTIVE | Noted: 2022-01-11

## 2022-05-02 ENCOUNTER — HOSPITAL ENCOUNTER (OUTPATIENT)
Dept: PHYSICAL THERAPY | Age: 52
Discharge: HOME | End: 2022-05-02
Payer: MEDICARE

## 2022-05-02 PROCEDURE — 97162 PT EVAL MOD COMPLEX 30 MIN: CPT

## 2022-05-02 PROCEDURE — 97110 THERAPEUTIC EXERCISES: CPT

## 2022-05-02 PROCEDURE — 97530 THERAPEUTIC ACTIVITIES: CPT

## 2022-05-02 NOTE — PROGRESS NOTES
[facility]  [address]  [phone] fax    Plan of Care/ Statement of Necessity for Physical Therapy Services    Patient name: Allyson Clark Start of Care: 2022   Referral source: Yvette Smith MD : 1970    Medical Diagnosis: Age-related physical debility [R54]  Payor: [place] MEDICARE / Plan: [place] MEDICARE PART A & B / Product Type: Medicare /  Onset Date:CVA ;   COVID-19 2022    Treatment Diagnosis: B LE weakness, abnormalities of gait and mobility   Prior Hospitalization: see medical history Provider#: [id number]   Medications: Verified on Patient summary List    Comorbidities: HTN, arthritis, diabetes, heart disease, depresion, visual impairment   Prior Level of Function: ambulating with quad cane; lives in 1 story home with ramp with 3 steps to enter living area; lives with wife and daughter; has shower chair      The Plan of Care and following information is based on the information from the initial evaluation. Assessment/ key information: Patient is a 59-year-old right-handed male who presents to therapy s/p CVA in  and diagnosis of COVID-19 with hospitalization in 2022. He has increased SOB. Patient reports difficulty with ambulation. He reports left hip pain this visit. Patient presents to therapy ambulating with decreased gait speed and decreased right hip flex/knee flex/ankle DF contributing to decreased foot clearance; patient compensates with left lateral trunk lean, hip circumduction, and increased inversion on right. Patient with AFO donned on right; CGA and cues for cane placement to get all points in contact with floor with ambulation. Patient able to complete sit to stand transfer from 19\" plinth height with left UE support followed by 20\" height with B hands placed on knee; decreased eccentric control and standby to [facility] for transfer. Patient reports independence with bed mobility.  Bilateral hip flex strength diminished (3-/5 left and 2+/5 right); right LE strength measured at 2+/5 for knee flex, 3-/5 for knee ext, and trace for ankle DF. He was able to maintain Romberg stance and MSR stance with left posterior for 30 sec with standby to [facility]. Patient would benefit from skilled outpatient PT to address above mentioned deficits to return to prior level of function, increase independence with ADLs, and improve overall quality of life. Patient's aide present for initial evaluation. Patient's wife reports patient has impaired memory. Evaluation Complexity History HIGH Complexity :3+ comorbidities / personal factors will impact the outcome/ POC ; Examination HIGH Complexity : 4+ Standardized tests and measures addressing body structure, function, activity limitation and / or participation in recreation  ;Presentation MEDIUM Complexity : Evolving with changing characteristics  ; Clinical Decision Making MEDIUM Complexity : FOTO score of 26-74  Overall Complexity Rating: MEDIUM  Problem List: pain affecting function, decrease ROM, decrease strength, impaired gait/ balance, decrease ADL/ functional abilitiies, decrease activity tolerance, decrease flexibility/ joint mobility and decrease transfer abilities   Treatment Plan may include any combination of the following: Therapeutic exercise, Therapeutic activities, Neuromuscular re-education, Physical agent/modality, Gait/balance training, Manual therapy, Patient education, Self Care training, Functional mobility training, Home safety training and Stair training  Patient / Family readiness to learn indicated by: asking questions, trying to perform skills and interest  Persons(s) to be included in education: patient (P) and family support person (FSP);list spouse, nurse/aide  Barriers to Learning/Limitations: yes;  altered mental status (i.e.Sedation, Confusion); visual impairment  Patient Goal (s): to walk better and move better and be more independent  Patient Self Reported Health Status: fair  Rehabilitation Potential: fair    Short Term Goals: To be accomplished in 3 treatments:   1. Patient will report compliance with initial HEP to optimize therapy outcomes. Long Term Goals: To be accomplished in 4 weeks:   1. Patient will improve FOTO score by at least 5 points in order to demonstrate functional improvement. 2. Patient will report no more than \"limited a little\" with \"Moderate activities like moving a table or pushing a vacuum \" with FOTO in order to demonstrate improved tolerance to ADLs. 3. Patient will be able to ambulate at least 150 ft with no more than [email or web address] [place] and supervision in order to navigate home with increased ease and safety. 4. Patient will be able to perform sit to stand transfer from chair height with no more than left UE assist and mod I in order to perform daily tasks with increased ease.        Frequency / Duration: Patient to be seen 2-3 times per week for 4 weeks. Patient/ Caregiver education and instruction: Diagnosis, prognosis, self care, activity modification and exercises   [x]  Plan of care has been reviewed with PTA    Certification Period: 5/2/22-5/31/22  Geoff Fermin, PT 5/2/2022 8:49 AM    ________________________________________________________________________    I certify that the above Therapy Services are being furnished while the patient is under my care. I agree with the treatment plan and certify that this therapy is necessary.     [de-identified] Signature:____________Date:_________TIME:________     Adolfo Fabian MD  ** Signature, Date and Time must be completed for valid certification **    Please sign and return to [facility]  [address] [place] [address]  [phone] fax

## 2022-05-02 NOTE — PROGRESS NOTES
PT DAILY TREATMENT NOTE/NEURO EVAL     Patient Name: Navya Mcadams  Date:2022  : 1970  [x]  Patient  Verified  Payor: Jan Becerra / Plan: [facility] [place] [place] [facility] / Product Type: Medicare /    In time: 9:47A  Out time:10:32A  Total Treatment Time (min): 45  Visit #: 1 of 12    Medicare/BCBS Only   Total Timed Codes (min):  7 1:1 Treatment Time:  *45     Treatment Area: Age-related physical debility [R54]    SUBJECTIVE  Pain Level (0-10 scale): 2/10 (left leg)  []constant []intermittent []improving []worsening []no change since onset    Any medication changes, allergies to medications, adverse drug reactions, diagnosis change, or new procedure performed?: [x] No    [] Yes (see summary sheet for update)  Subjective functional status/changes:         Aide present 7hrs/day Mon-Fri; [place]. Comorbidities: HTN, arthritis, diabetes, heart disease, depresion, visual impairment   Prior Level of Function: ambulating with quad cane; lives in 1 story home with ramp with 3 steps to enter living area; lives with wife and daughter; has shower chair      The Plan of Care and following information is based on the information from the initial evaluation. Assessment/ key information: Patient is a 71-year-old right-handed male who presents to therapy s/p CVA in  and diagnosis of COVID-19 with hospitalization in 2022. He has increased SOB. Patient reports difficulty with ambulation. He reports left hip pain this visit. Patient presents to therapy ambulating with decreased gait speed and decreased right hip flex/knee flex/ankle DF contributing to decreased foot clearance; patient compensates with left lateral trunk lean, hip circumduction, and increased inversion on right. Patient with AFO donned on right; CGA and cues for cane placement to get all points in contact with floor with ambulation.  Patient able to complete sit to stand transfer from 19\" plinth height with left UE support followed by 20\" height with B hands placed on knee; decreased eccentric control and standby to [facility] for transfer. Patient reports independence with bed mobility. Bilateral hip flex strength diminished (3-/5 left and 2+/5 right); right LE strength measured at 2+/5 for knee flex, 3-/5 for knee ext, and trace for ankle DF. He was able to maintain Romberg stance and MSR stance with left posterior for 30 sec with standby to [facility]. Patient would benefit from skilled outpatient PT to address above mentioned deficits to return to prior level of function, increase independence with ADLs, and improve overall quality of life. 38 min [x]Eval                  []Re-Eval       7 min Therapeutic Activity:  []  See flow sheet :   Rationale: increase ROM, increase strength, improve coordination, improve balance and increase proprioception  to improve the patients ability to perform ADLs with increased ease             With   [] TE   [x] TA   [] neuro   [] other: Patient Education: [x] Review HEP    [] Progressed/Changed HEP based on:   [] positioning   [] body mechanics   [] transfers   [] heat/ice application    [x] other: diagnosis, prognosis, POC, purpose and importance of therapy; anatomy and physiology as it relates to current condition;;adjusted quad cane to place appropriate legs closer to body; emphasis on therapy with breathing techniques and improving endurance; importance of posture to decrease hip flex tightnesss     Other Objective/Functional Measures:     Physical Therapy Evaluation - Neurologic    Posture: [x] Poor    [] Fair    [] Good    Describe: Forward flexed    Gait: [] Normal    [x] Abnormal    Device:      Describe:   Foot drag on right due to decreased hip, knee, and ankle PF        Strength (MMT):                                                                               Hip L (1-5) R (1-5)   Hip Flexion 3- 2+   Hip Ext     Hip ABD     Hip ADD     Hip ER 4-    Hip IR 5      Knee L (1-5) R (1-5)   Knee Flexion 5 2+   Knee Extension 5 3-   Ankle PF     Ankle DF 5 trace   Other       Tone:    Motor Control:    Sensation:    Reflexes: [] Not Tested   Left Right   Biceps (C5)     Brachioradiais (C6)     Triceps (C7)     Knee Jerk (L4)     Ankle Jerk (SI)       Balance/ Equilibrium:              Left            Right  Tracks Across Midline      Reaches Across Midline           Sitting Balance: Static:  [] Good    [] Fair    [] Poor     Dynamic:   [] Good    [] Fair    [] Poor        Standing Balance: Static:   [] Good    [] Fair    [] Poor     Dynamic:   [] Good    [] Fair    [] Poor        Protective Extension:  [] Present    [] Delayed    [] Absent        Single Leg Stance:         Eyes Open  Eyes Closed   L  L    R  R        Functional Mobility      Bed Mobility:   Patient reports independence    Scooting:        Rolling:       Sit-Supine:      Transfers:       Sit-Stand:       Floor-Stand:       Gait:       Tandem:       Backwards:       Braiding:      Elevations:       Curbs:      Ramps:      Stairs:    Behavior: [x] Cooperative    [] Impulsive    [] Agitated    [] Perseverative    [] Confused   Oriented x:    Cognition: [] One Step Commands   [x] Multiple Commands   [] Displays Neglect [] R  [] L    Other:       Impaired Judgement: [] Y    [] N      Impaired Vision:  [] Y    [] N      Safety Awareness Deficits  [] Y    [] N      Impaired Hearing  [] Y    [] N      Able to Express Needs [] Y    [] N    Optional Tests:       Dynamic Gait Index (24pt scale): Functional Gait Assessment (30pt scale):       Gu Balance Scale (56pt scale):     Other test /comments:      Sit to stands: 19\" plinth, left UE support; standby to CGA      Romberg, MSR left posterior 30 sec with standby to [facility]    Pain Level (0-10 scale) post treatment: 0/10    ASSESSMENT/Changes in Function: See POC    Patient will continue to benefit from skilled PT services to modify and progress therapeutic interventions, address functional mobility deficits, address ROM deficits, address strength deficits, analyze and address soft tissue restrictions, analyze and cue movement patterns, analyze and modify body mechanics/ergonomics, assess and modify postural abnormalities, address imbalance/dizziness and instruct in home and community integration to attain remaining goals.      [x]  See Plan of Care  []  See progress note/recertification  []  See Discharge Summary         Progress towards goals / Updated goals:  See POC    PLAN  []  Upgrade activities as tolerated     [x]  Continue plan of care  []  Update interventions per flow sheet       []  Discharge due to:_  []  Other:_      Desean Ba, PT 5/2/2022  8:45 AM

## 2022-05-04 ENCOUNTER — HOSPITAL ENCOUNTER (OUTPATIENT)
Dept: PHYSICAL THERAPY | Age: 52
End: 2022-05-04
Payer: MEDICARE

## 2022-05-12 ENCOUNTER — TELEPHONE (OUTPATIENT)
Dept: PHYSICAL THERAPY | Age: 52
End: 2022-05-12

## 2022-05-12 NOTE — TELEPHONE ENCOUNTER
CALLED TO SCHEDULE F/U FOR PATIENT BUT PATIENT'S WIFE SAID THAT HE MAY NOT WANT THERAPY AND THAT SHE WILL CALL BACK TO LET US KNOW IF HE WANTS TO BE SCHEDULED.

## 2022-05-18 ENCOUNTER — HOSPITAL ENCOUNTER (OUTPATIENT)
Dept: PHYSICAL THERAPY | Age: 52
Discharge: HOME | End: 2022-05-18
Payer: MEDICARE

## 2022-05-18 PROCEDURE — 97110 THERAPEUTIC EXERCISES: CPT

## 2022-05-18 PROCEDURE — 97112 NEUROMUSCULAR REEDUCATION: CPT
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
## 2022-05-19 ENCOUNTER — HOSPITAL ENCOUNTER (OUTPATIENT)
Dept: PHYSICAL THERAPY | Age: 52
Discharge: HOME | End: 2022-05-19
Payer: MEDICARE

## 2022-05-19 PROCEDURE — 97166 OT EVAL MOD COMPLEX 45 MIN: CPT
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
## 2022-05-24 ENCOUNTER — HOSPITAL ENCOUNTER (OUTPATIENT)
Dept: PHYSICAL THERAPY | Age: 52
Discharge: HOME | End: 2022-05-24
Payer: MEDICARE

## 2022-05-24 PROCEDURE — 97530 THERAPEUTIC ACTIVITIES: CPT

## 2022-05-24 PROCEDURE — 97112 NEUROMUSCULAR REEDUCATION: CPT

## 2022-05-24 NOTE — PROGRESS NOTES
PT DAILY TREATMENT NOTE     Patient Name: Nora Ribeiro  Date:2022  : 1970  [x]  Patient  Verified  Payor: [facility] MEDICARE / Plan: ALEXIS SHEPHERD [facility] / Product Type: Managed Care Medicare /    In time:12:55P  Out time:1:39P  Total Treatment Time (min): 44  Visit #: 3 of 12    Medicare/BCBS Only   Total Timed Codes (min):  44 1:1 Treatment Time:  44       Treatment Area: Weakness of right lower extremity [R29.898]  Weakness of left lower extremity [R29.898]  Other abnormalities of gait and mobility [R26.89]    SUBJECTIVE  Pain Level (0-10 scale): 0/10  Any medication changes, allergies to medications, adverse drug reactions, diagnosis change, or new procedure performed?: [x] No    [] Yes (see summary sheet for update)  Subjective functional status/changes:   [] No changes reported    Patient reports he felt fine after last visit. Spouse reports patient only has pain when walking (normally in back). He felt fine after last visit. They just got a new AFO; patient denies discomfort. Patient's spouse reports he will take walks with his nurse to try to avoid using walls/furniture to walk but that patient will decline going outside/performing exercises due to knee pain/being tired.     OBJECTIVE      34 min Therapeutic Activity:  [x]  See flow sheet : ambulation to therapy gym with CGA; sit to stand transfers, vitals assessment, patient/family ed   Rationale: increase ROM, increase strength, improve coordination, improve balance and increase proprioception  to improve the patients ability to perform daily tasks with increased ease     10 min Neuromuscular Re-education:  [x]  See flow sheet : PNF, SAQ   Rationale: increase ROM, increase strength, improve coordination, improve balance and increase proprioception  to improve the patients ability to perform daily tasks with increased ease            With   [] TE   [x] TA   [] neuro   [] other: Patient Education: [x] Review HEP    [] Progressed/Changed HEP based on:   [] positioning   [] body mechanics   [] transfers   [] heat/ice application    [x] other: calf stretching and use of heat to posterior knee to reduce calf tightness; ability to use pain relieving creams as long as not adverse reactions noted and no allergies to ingredients; \"body follows head\" to promote increased anterior trunk lean with sit to stands and upright posture with ambulation; importance of HEP compliance to optimize therapy outcomes     Other Objective/Functional Measures:     Ambulation to gym-patient with several missteps and increased respiration rate-improved with cues to take standing rest breaks and taking deep breaths  /88, HR 75 bpm at start of session  CGA for ambulation during session  Sit to stands-poor eccentric control requiring UE support, decreased forward weight shift/anterior trunk lean improved with verbal and visual cues  Patient will attempt to stand with left foot more posterior  TTP to lateral gastroc head  Right knee pain reported with active marching in supine and active PNF on right (but no pain with activity perform passively or with AA)  Challenged with SAQ on right  Inability to fully extend knee on left due to left hip pain in supine  Increased fatigue at end of session leading to difficulty with turning (requiring tactile cues with tapping on thigh and verbal cues for direction, decreased foot clearance, and decreased gait speed    Pain Level (0-10 scale) post treatment: 0/10    ASSESSMENT/Changes in Function: Patient presents to therapy with AFO donned contributing to improved foot clearance with ambulation. He demonstrates increased independence with ambulation with decreased reliance on walls/objects this visit but continues to require up to CGA for safety due to instability and tendency to run into objects on the left side.  He continues to demonstrate decreased overall endurance contributing to impaired gait/decreased foot clearance and increased respiration rate with prolonged ambulation. Patient limited by right knee pain and left hip pain this visit; knee pain contributing to difficulty with ambulation at home. Patient will continue to benefit from skilled PT services to modify and progress therapeutic interventions, address functional mobility deficits, address ROM deficits, address strength deficits, analyze and address soft tissue restrictions, analyze and cue movement patterns, analyze and modify body mechanics/ergonomics, assess and modify postural abnormalities, address imbalance/dizziness and instruct in home and community integration to attain remaining goals. Progress towards goals / Updated goals:  Short Term Goals: To be accomplished in 3 treatments:              1. Patient will report compliance with initial HEP to optimize therapy outcomes. Not compliant (5/18/22)  Long Term Goals: To be accomplished in 4 weeks:              1. Patient will improve FOTO score by at least 5 points in order to demonstrate functional improvement.               2. Patient will report no more than \"limited a little\" with \"Moderate activities like moving a table or pushing a vacuum \" with FOTO in order to demonstrate improved tolerance to ADLs.             3. Patient will be able to ambulate at least 150 ft with no more than SBQC and supervision in order to navigate home with increased ease and safety.              4. Patient will be able to perform sit to stand transfer from chair height with no more than left UE assist and mod I in order to perform daily tasks with increased ease.  Progressing-21.5\" with left UE assist to sit with standby assist to [address] (5/24/22)    PLAN  [x]  Upgrade activities as tolerated     [x]  Continue plan of care  []  Update interventions per flow sheet       []  Discharge due to:_  []  Other:_      Pippa Garcia, PT 5/24/2022  10:21 AM    Future Appointments   Date Time Provider Katina Barrera   5/24/2022 12:45 PM Xiomara Nolene Shahrzad, [place] [facility]   5/25/2022  9:45 AM Xiomara Nolene Shahrzad, PT [facility]   5/27/2022  1:30 PM Brandon Audrey, PT [facility]   6/2/2022  9:00 AM Clarissa Prow, PTA [facility]   6/3/2022  1:30 PM Clarissa Prow, PTA [facility]   6/6/2022  9:45 AM Clarissa Prow, PTA [facility]   6/8/2022 10:30 AM Clarissa Prow, PTA [facility]   6/10/2022 12:00 PM Xiomara Nolene Shahrzad, PT [facility]   6/13/2022 11:15 AM Xiomara Nolene Shahrzad, PT [facility]   6/15/2022 11:15 AM Clarissa Prow, PTA [facility]   6/17/2022 11:15 AM Clarissa Prow, PTA [facility]   6/20/2022 11:15 AM Clarissa Prow, PTA [facility]   6/22/2022 11:15 AM Clarissa Prow, PTA [facility]   6/24/2022 12:00 PM Xiomara Nolene Shahrzad, PT [facility]   6/27/2022 11:15 AM Clarissa Prow, PTA [facility]   6/29/2022 11:15 AM Clarissa Prow, PTA [facility]   7/1/2022 11:15 AM Clarissa Prow, PTA [facility]

## 2022-05-25 ENCOUNTER — TELEPHONE (OUTPATIENT)
Dept: PHYSICAL THERAPY | Age: 52
End: 2022-05-25

## 2022-05-27 ENCOUNTER — HOSPITAL ENCOUNTER (OUTPATIENT)
Dept: PHYSICAL THERAPY | Age: 52
Discharge: HOME | End: 2022-05-27
Payer: MEDICARE

## 2022-05-27 PROCEDURE — 97112 NEUROMUSCULAR REEDUCATION: CPT

## 2022-05-27 PROCEDURE — 97530 THERAPEUTIC ACTIVITIES: CPT

## 2022-05-27 PROCEDURE — 97110 THERAPEUTIC EXERCISES: CPT

## 2022-05-27 NOTE — PROGRESS NOTES
PT DAILY TREATMENT NOTE     Patient Name: Aric Gonzalez  Date:2022  : 1970  [x]  Patient  Verified  Payor: [employer] MEDICARE / Plan: ALEXIS SHEPHERD [facility] / Product Type: Managed Care Medicare /    In time: 1:30  Out time: 2:25  Total Treatment Time (min): 55  Visit #: 4 of 12    Medicare/BCBS Only   Total Timed Codes (min):  55 1:1 Treatment Time: 55       Treatment Area: Weakness of right lower extremity [R29.898]  Weakness of left lower extremity [R29.898]  Other abnormalities of gait and mobility [R26.89]    SUBJECTIVE  Pain Level (0-10 scale): 0/10  Any medication changes, allergies to medications, adverse drug reactions, diagnosis change, or new procedure performed?: [x] No    [] Yes (see summary sheet for update)  Subjective functional status/changes:   [] No changes reported  Pt reports no current pain. The new nurse Caryn Encinas is present for the session with permission. Family reports he is working on walking at home but not too many other exercises.     OBJECTIVE    10 min Therapeutic Exercise:  [x]  See flow sheet :   Rationale: increase ROM, increase strength, improve coordination, improve balance and increase proprioception  to improve the patients ability to perform daily tasks with increased ease    30 min Therapeutic Activity:  [x]  See flow sheet : ambulation to therapy gym with CGA; vitals assessment, patient/family ed   Rationale: increase ROM, increase strength, improve coordination, improve balance and increase proprioception  to improve the patients ability to perform daily tasks with increased ease     15 min Neuromuscular Re-education:  [x]  See flow sheet : PNF, SAQ   Rationale: increase ROM, increase strength, improve coordination, improve balance and increase proprioception  to improve the patients ability to perform daily tasks with increased ease            With   [] TE   [] TA   [] neuro   [] other: Patient Education: [x] Review HEP    [] Progressed/Changed HEP based on:   [] positioning   [] body mechanics   [] transfers   [] heat/ice application    [] other:      Other Objective/Functional Measures:  Left hip flexion contracture limits upright posture and right step length  Improved heel strike with AFO donned  Decreased cruz with ambulation and cues to increase right step length and keep all 4 points of SBQC on ground during gait  Utilized right UE gripping assistance on the bar to elicit use of right hand  Challenged with right steps forward and right marches  Quad lag with SLR on the right  Fatigues quickly in right hip flexor  Educated nurse to slowly work on straightening left hip in supine to start and use heat or stick rolling to reduce tension of muscle    Pain Level (0-10 scale) post treatment: 0/10    ASSESSMENT/Changes in Function: Pt motivated during session to push through pain and fatigue. He demonstrates forward flexed posture due to left hip flexion contracture. He has decreased right hip flexion strength to increase right step length to assist with cruz during ambulation. He furniture walks at home. Will continue to progress right LE strength and balance to decrease fall risk and improve independence at home. Patient will continue to benefit from skilled PT services to modify and progress therapeutic interventions, address functional mobility deficits, address ROM deficits, address strength deficits, analyze and address soft tissue restrictions, analyze and cue movement patterns, analyze and modify body mechanics/ergonomics, assess and modify postural abnormalities, address imbalance/dizziness and instruct in home and community integration to attain remaining goals. Progress towards goals / Updated goals:  Short Term Goals: To be accomplished in 3 treatments:              1. Patient will report compliance with initial HEP to optimize therapy outcomes. Not compliant (5/18/22)  Long Term Goals: To be accomplished in 4 weeks:              1.  Patient will improve FOTO score by at least 5 points in order to demonstrate functional improvement.               2. Patient will report no more than \"limited a little\" with \"Moderate activities like moving a table or pushing a vacuum \" with FOTO in order to demonstrate improved tolerance to ADLs.             3. Patient will be able to ambulate at least 150 ft with no more than SBQC and supervision in order to navigate home with increased ease and safety.              4. Patient will be able to perform sit to stand transfer from chair height with no more than left UE assist and mod I in order to perform daily tasks with increased ease.  Progressing-21.5\" with left UE assist to sit with standby assist to CGA (5/24/22)    PLAN  [x]  Upgrade activities as tolerated     [x]  Continue plan of care  []  Update interventions per flow sheet       []  Discharge due to:_  []  Other:_      Master Lomax, BLU 5/27/2022  10:21 AM    Future Appointments   Date Time Provider Katina Barrera   5/27/2022  1:30 PM Jessica Back, PTA [facility]   6/2/2022  9:00 AM Jessica Back, PTA [facility]   6/3/2022  1:30 PM Jessica Back, PTA [facility]   6/6/2022 12:45 PM Sarai Weiss, PTA [facility]   6/8/2022 10:30 AM Jessica Back, PTA [facility]   6/10/2022 12:00 PM Xiomara Dorethia Art, PT [facility]   6/13/2022 11:15 AM Xiomara Dorethia Art, PT [facility]   6/15/2022 11:15 AM Jessica Back, PTA [facility]   6/17/2022 11:15 AM Jessica Back, [facility]   6/20/2022 11:15 AM Jessica Back, PTA [facility]   6/22/2022 11:15 AM Jessica Back, PTA [facility]   6/24/2022 12:00 PM Xiomara Edge, PT [facility]   6/27/2022 11:15 AM Jessica Back, PTA [facility]   6/29/2022 11:15 AM Jessica Back, PTA [facility]   7/1/2022 11:15 AM Jessica Back, PTA [facility]

## 2022-06-03 ENCOUNTER — HOSPITAL ENCOUNTER (OUTPATIENT)
Dept: PHYSICAL THERAPY | Age: 52
Discharge: HOME | End: 2022-06-03
Payer: MEDICARE

## 2022-06-03 PROCEDURE — 97110 THERAPEUTIC EXERCISES: CPT

## 2022-06-03 PROCEDURE — 97112 NEUROMUSCULAR REEDUCATION: CPT

## 2022-06-06 ENCOUNTER — APPOINTMENT (OUTPATIENT)
Dept: PHYSICAL THERAPY | Age: 52
End: 2022-06-06
Payer: MEDICARE

## 2022-06-07 ENCOUNTER — HOSPITAL ENCOUNTER (OUTPATIENT)
Dept: PHYSICAL THERAPY | Age: 52
Discharge: HOME | End: 2022-06-07
Payer: MEDICARE

## 2022-06-07 PROCEDURE — 97112 NEUROMUSCULAR REEDUCATION: CPT

## 2022-06-07 PROCEDURE — 97535 SELF CARE MNGMENT TRAINING: CPT

## 2022-06-07 NOTE — PROGRESS NOTES
OT DAILY TREATMENT NOTE     Patient Name: Joan Carrier  Date:2022  : 1970  [x]  Patient  Verified  Payor: [facility] MEDICARE / Plan: ALEXIS SHEPHERD [facility] / Product Type: Managed Care Medicare /    In time:1122  Out time:1200  Total Treatment Time (min): 38  Visit #: 2 of 8    Medicare/BCBS Only   Total Timed Codes (min): 38  1:1 Treatment Time:  38     Treatment Area: Weakness of right upper extremity [R29.898]    SUBJECTIVE  Pain Level (0-10 scale): 0/10  Any medication changes, allergies to medications, adverse drug reactions, diagnosis change, or new procedure performed?: [x] No    [] Yes (see summary sheet for update)  Subjective functional status/changes:   [] No changes reported  somedays he does well  He gets confused sometimes-no pattern re morning or night  He was doing well with memory stuff but when the speech stopped he stopped working on it    OBJECTIVE       10 min Neuromuscular Re-education:  []  See flow sheet :   Rationale: increase ROM and increase strength  to improve the patients ability to incorporate right hand/UE  BUE towel slides shoulder flex and abduction  AAROM elbow flex ext with quick stretch       28 min Self Care/Home Management: dressing   Rationale: improve coordination  to improve the patients ability to dress self  Completed t shirt, button front shirt, shoes socks and AFO with verbal cue to improve technique and mirror for feedback    With   [] TE   [] TA   [] neuro   [x] other: Patient Education: [x] Review HEP    [] Progressed/Changed HEP based on: mirror, tablet to improve engagement and cognitive skills  [] positioning   [] body mechanics   [] transfers   [] heat/ice application   [] Splint wear/care   [] Sensory re-education   [] scar management      [] other:            Other Objective/Functional Measures:   Able to demonstrate good dressing techniques     Pain Level (0-10 scale) post treatment: 0/10    ASSESSMENT/Changes in Function: no evidence of confusion or lack of vigilance in task completion during dressing tasks    Patient will continue to benefit from skilled OT services to modify and progress therapeutic interventions, address strength deficits, analyze and cue movement patterns and instruct in home and community integration to attain remaining goals. []  See Plan of Care  []  See progress note/recertification  []  See Discharge Summary         Progress towards goals / Updated goals:  1. Patient  will be able to follow multi step written/visual cues  to complete targeted self care tasks with moderate verbal cues. 6/7 Minimal verbal cues for dressing only     Long Term Goals: To be accomplished in 4 weeks:          1. Patient and caregiver will report improved performance in self care through use of checklist and occasional cues.   2.  Patient will be able to incorporate left UE into tasks such as wiping table using bimanual     PLAN  []  Upgrade activities as tolerated     []  Continue plan of care  []  Update interventions per flow sheet       []  Discharge due to:_  []  Other:_      Tessy Velazquez, OTR/L 6/7/2022  11:09 AM    Future Appointments   Date Time Provider Katina Barrera   6/7/2022 11:15 AM Joseph Paganini, OTR/L [facility]   6/9/2022 11:15 AM Joseph Paganini, OTR/L [facility]   6/14/2022 11:15 AM Joseph Paganini, OTR/L [facility]   6/17/2022 12:00 PM Memo Mendoza OT [facility]   6/22/2022 11:15 AM Joseph Paganini, OTR/L [facility]   6/24/2022 11:15 AM [facility] [address] [facility]   6/28/2022 10:30 AM Joseph Paganini, OTR/L [facility]   6/30/2022 11:15 AM Joseph Paganini, OTR/L [facility]

## 2022-06-08 ENCOUNTER — APPOINTMENT (OUTPATIENT)
Dept: PHYSICAL THERAPY | Age: 52
End: 2022-06-08
Payer: MEDICARE

## 2022-06-09 ENCOUNTER — HOSPITAL ENCOUNTER (OUTPATIENT)
Dept: PHYSICAL THERAPY | Age: 52
Discharge: HOME | End: 2022-06-09
Payer: MEDICARE

## 2022-06-09 PROCEDURE — 97110 THERAPEUTIC EXERCISES: CPT

## 2022-06-09 PROCEDURE — 97112 NEUROMUSCULAR REEDUCATION: CPT

## 2022-06-09 PROCEDURE — 97535 SELF CARE MNGMENT TRAINING: CPT

## 2022-06-09 NOTE — PROGRESS NOTES
OT DAILY TREATMENT NOTE     Patient Name: Shay Zepedabinder  Date:2022  : 1970  [x]  Patient  Verified  Payor: [facility] MEDICARE / Plan: ALEXIS SHEPHERD [facility] / Product Type: Managed Care Medicare /    In time:1120  Out time:1200  Total Treatment Time (min): 40  Visit #: 3 of 8    Medicare/BCBS Only   Total Timed Codes (min):  40 1:1 Treatment Time:  40     Treatment Area: Weakness of right upper extremity [R29.898]    SUBJECTIVE  Pain Level (0-10 scale): 0/10  Any medication changes, allergies to medications, adverse drug reactions, diagnosis change, or new procedure performed?: [x] No    [] Yes (see summary sheet for update)  Subjective functional status/changes:   [] No changes reported  Radha Quintanilla at home. Was on porch after nurse left and he fell  He does pretty well with the toilet on and off   Discussed concerns re being alone on porch, being able to clean self well, need for grab bar    OBJECTIVE      10 min Therapeutic Exercise:  [] See flow sheet :   Rationale: increase ROM to improve the patients ability to stretch  BUE exercises table level to slide towel with assist of other arm     10 min Neuromuscular Re-education:  []  See flow sheet :   Rationale: increase ROM and increase strength  to improve the patients ability to use right UE  AAROM hand held by OT for shoulder flex ext, elbow flex ext  For HEP        20 min Self Care/Home Management: review of status, issues   Rationale: increase ROM and improve coordination  to improve the patients ability to perform self care task of toileting  Reviewed options such as baby wipes for self cleaning. Concern re history of prior skin breakdown, need to use diapers and hygiene.   Reviewed need to have aide be sure patient is inside home before leaving  Consider extending nursing hours, or having family sit if you will be out    With   [] TE   [] TA   [] neuro   [] other: Patient Education: [x] Review HEP    [] Progressed/Changed HEP based on:   [] positioning   [] body mechanics   [] transfers   [] heat/ice application   [] Splint wear/care   [] Sensory re-education   [] scar management      [x] other: caregiving suggestions           Other Objective/Functional Measures:   Patient with recent fall at home     Pain Level (0-10 scale) post treatment: 0/10    ASSESSMENT/Changes in Function: wife appears overwhelmed but is working to resolve safety concerns    Patient will continue to benefit from skilled OT services to modify and progress therapeutic interventions, address ROM deficits, address strength deficits, analyze and address soft tissue restrictions and instruct in home and community integration to attain remaining goals. []  See Plan of Care  []  See progress note/recertification  []  See Discharge Summary         Progress towards goals / Updated goals:  1.  Patient  will be able to follow multi step written/visual cues  to complete targeted self care tasks with moderate verbal cues. 6/7 Minimal verbal cues for dressing only  6/9: reviewed caregiver concerns     Long Term Goals: To be accomplished in 4 weeks:          1.  Patient and caregiver will report improved performance in self care through use of checklist and occasional cues.   2.  Patient will be able to incorporate left UE into tasks such as wiping table   6/9: progressing    PLAN  []  Upgrade activities as tolerated     []  Continue plan of care  []  Update interventions per flow sheet       []  Discharge due to:_  []  Other:_      Radha Cameron OTR/L 6/9/2022  10:27 AM    Future Appointments   Date Time Provider Katina Barrera   6/9/2022 11:15 AM Leena Lira OTR/L [facility]   6/14/2022 11:15 AM Leena Lira OTR/L [facility]   6/17/2022 12:00 PM Derrick Tovar OT [facility]   6/22/2022 11:15 AM Leena Lira OTR/L [facility]   6/24/2022 11:15 AM [facility] PT [address] [facility]   6/28/2022 10:30 AM Leena Lira OTR/L [facility]   6/30/2022 11:15 AM Leena Lira OTR/L [facility]

## 2022-06-10 ENCOUNTER — APPOINTMENT (OUTPATIENT)
Dept: PHYSICAL THERAPY | Age: 52
End: 2022-06-10
Payer: MEDICARE

## 2022-06-13 ENCOUNTER — APPOINTMENT (OUTPATIENT)
Dept: PHYSICAL THERAPY | Age: 52
End: 2022-06-13
Payer: MEDICARE

## 2022-06-14 ENCOUNTER — HOSPITAL ENCOUNTER (OUTPATIENT)
Dept: PHYSICAL THERAPY | Age: 52
Discharge: HOME | End: 2022-06-14
Payer: MEDICARE

## 2022-06-14 PROCEDURE — 97110 THERAPEUTIC EXERCISES: CPT

## 2022-06-14 PROCEDURE — 97112 NEUROMUSCULAR REEDUCATION: CPT

## 2022-06-14 PROCEDURE — 97535 SELF CARE MNGMENT TRAINING: CPT

## 2022-06-14 NOTE — PROGRESS NOTES
OT DAILY TREATMENT NOTE     Patient Name: Shay Zepedabinder  Date:2022  : 1970  [x]  Patient  Verified  Payor: [facility] MEDICARE / Plan: ALEXIS SHEPHERD [facility] / Product Type: Managed Care Medicare /    In time:1120  Out time:1200  Total Treatment Time (min): 40  Visit #: 4 of 8    Medicare/BCBS Only   Total Timed Codes (min):  40 1:1 Treatment Time:  40     Treatment Area: Weakness of right upper extremity [R29.898]    SUBJECTIVE  Pain Level (0-10 scale): 0/10  Any medication changes, allergies to medications, adverse drug reactions, diagnosis change, or new procedure performed?: [x] No    [] Yes (see summary sheet for update)  Subjective functional status/changes:   [] No changes reported  He always does that (sits too fast)    OBJECTIVE      15 min Therapeutic Exercise:  [] See flow sheet :   Rationale: increase ROM and increase strength to improve the patients ability to move right UE  BUE towel slides shoulder flex and hor ab add  BUE shoulder flex hands clasped     10 min Neuromuscular Re-education:  []  See flow sheet :   Rationale: increase ROM and increase strength  to improve the patients ability to move right UE  AAROM elbow flex ext, hor ab add with support, sup pro with support         15 min Self Care/Home Management: clothes management   Rationale: increase ROM, improve coordination and improve balance  to improve the patients ability to manage clothing well at toilet  Standing retrieved t band from thighs to proper posisition at waist  Standing pulled and straightened pants wasitband on right side  Standing retrieved 10/10 clothespins from pants at midline and toright  side on buttons    With   [] TE   [] TA   [] neuro   [] other: Patient Education: [x] Review HEP    [] Progressed/Changed HEP based on: slow stand to sit to count of 5  [] positioning   [] body mechanics   [] transfers   [] heat/ice application   [] Splint wear/care   [] Sensory re-education   [] scar management [] other:            Other Objective/Functional Measures:   Able to straighten pants waist and pull up with verbal cues     Pain Level (0-10 scale) post treatment: 0/10    ASSESSMENT/Changes in Function: improving self care skills    Patient will continue to benefit from skilled OT services to modify and progress therapeutic interventions, address ROM deficits, address strength deficits, analyze and address soft tissue restrictions, analyze and cue movement patterns and instruct in home and community integration to attain remaining goals. []  See Plan of Care  []  See progress note/recertification  []  See Discharge Summary         Progress towards goals / Updated goals:  1.  Patient  will be able to follow multi step written/visual cues  to complete targeted self care tasks with moderate verbal cues. 6/7 Minimal verbal cues for dressing only  6/9: reviewed caregiver concerns  6/14: Goal modified to reflect current concerns re safety and toileting, which will not be addressed effectively with check lists.       Long Term Goals: To be accomplished in 4 weeks:          1.  Patient and caregiver will report improved performance in self care through use of checklist and occasional cues.   6/14: No longer pursuing check list, working to incorporate safety strategies  2.  Patient will be able to incorporate left UE into tasks such as wiping table   6/9: progressing  6/14: able to move right UE across table with left assist.    PLAN  []  Upgrade activities as tolerated     [x]  Continue plan of care  []  Update interventions per flow sheet       []  Discharge due to:_  []  Other:_      Antoinette Antonio, OTR/L 6/14/2022  11:20 AM    Future Appointments   Date Time Provider Katina Barrera   6/17/2022 12:00 PM Ameena Dietrich OT [facility]   6/22/2022 11:15 AM Arturo Lundborg, OTR/L [facility]   6/24/2022 11:15 AM [facility] [address] [facility]   6/28/2022 10:30 AM Arturo Lundborg, OTR/L [facility]   6/30/2022 11:15 AM Lilo Dsouza, OTR/L [facility]

## 2022-06-15 ENCOUNTER — APPOINTMENT (OUTPATIENT)
Dept: PHYSICAL THERAPY | Age: 52
End: 2022-06-15
Payer: MEDICARE

## 2022-06-17 ENCOUNTER — APPOINTMENT (OUTPATIENT)
Dept: PHYSICAL THERAPY | Age: 52
End: 2022-06-17
Payer: MEDICARE

## 2022-06-17 ENCOUNTER — HOSPITAL ENCOUNTER (OUTPATIENT)
Dept: PHYSICAL THERAPY | Age: 52
Discharge: HOME | End: 2022-06-17
Payer: MEDICARE

## 2022-06-17 PROCEDURE — 97112 NEUROMUSCULAR REEDUCATION: CPT

## 2022-06-17 PROCEDURE — 97535 SELF CARE MNGMENT TRAINING: CPT

## 2022-06-17 PROCEDURE — 97110 THERAPEUTIC EXERCISES: CPT

## 2022-06-17 NOTE — PROGRESS NOTES
OT DAILY TREATMENT NOTE 10-18    Patient Name: Chirag Vasquez  Date:2022  : 1970  [x]  Patient  Verified  Payor: [facility] MEDICARE / Plan: ALEXIS SHEPHERD [facility] / Product Type: Managed Care Medicare /    In time:1200  Out time:1243  Total Treatment Time (min): 43  Visit #: 5 of 8    Medicare/BCBS Only   Total Timed Codes (min):  43 1:1 Treatment Time:  43     Treatment Area: Weakness of right upper extremity [R29.898]    SUBJECTIVE  Pain Level (0-10 scale): 0/10  Any medication changes, allergies to medications, adverse drug reactions, diagnosis change, or new procedure performed?: [x] No    [] Yes (see summary sheet for update)  Subjective functional status/changes:   [] No changes reported  I can open the door    OBJECTIVE    18 min Therapeutic Exercise:  [] See flow sheet :   Rationale: increase ROM and increase strength to improve the patients ability to move Right UE    BUE towel slides shoulder flex and hor ab add  Right UE towel slides shoulder flex and hor ab add  BUE shoulder flex hands clasped  PROM Right UE    10 min Neuromuscular Re-education:  []  See flow sheet :   Rationale: increase ROM and increase strength  to improve the patients ability to move right UE    AAROM elbow flex ext, hor ab add with support, sup pro with support        15 min Self Care/Home Management: home/self care   Rationale: increase ROM, increase strength and improve coordination  to improve the patients ability to increase independence with ADL's      FOTO  Zipper- Table level with Right UE as assist   Kitchen Task- Open/Close refridgerator door - performed in stand and seated in wheelchair         With   [] TE   [] TA   [] neuro   [] other: Patient Education: [x] Review HEP    [] Progressed/Changed HEP based on:   [] positioning   [] body mechanics   [] transfers   [] heat/ice application   [] Splint wear/care   [] Sensory re-education   [] scar management      [] other:            Other Objective/Functional Measures:     Pain with supination during PROM  Some movement with horizontal abduction/adduction with elbow support provided by HILL    FOTO 40 (31)     Pain Level (0-10 scale) post treatment: 0/10    ASSESSMENT/Changes in Function: functional self care skills slowly improving     Patient will continue to benefit from skilled OT services to modify and progress therapeutic interventions, address ROM deficits, address strength deficits and instruct in home and community integration to attain remaining goals. []  See Plan of Care  []  See progress note/recertification  []  See Discharge Summary         Progress towards goals / Updated goals:  1.  Patient  will be able to follow multi step written/visual cues  to complete targeted self care tasks with moderate verbal cues. 6/7 Minimal verbal cues for dressing only  6/9: reviewed caregiver concerns  6/14: Goal modified to reflect current concerns re safety and toileting, which will not be addressed effectively with check lists.     Status at [facility]:  Current Status (6/17/2022): Long Term Goals: To be accomplished in 4 weeks:          1.  Patient and caregiver will report improved performance in self care through use of checklist and occasional cues. 6/14: No longer pursuing check list, working to incorporate safety strategies   Status at [facility]:   Current Status (6/17/2022): No noticeable changes per patient/patient wife report, however pt able to open/close refrigerator in stand/seated with Right UE    2.  Patient will be able to incorporate left UE into tasks such as wiping table using bimanual technique ot increase funciotn per FOTO by at least 10 points.     6/9: progressing  6/14: able to move right UE across table with left assist.  Status at [facility]:  Current Status (6/17/2022):       PLAN  []  Upgrade activities as tolerated     [x]  Continue plan of care  []  Update interventions per flow sheet       []  Discharge due to:_  []  Other:_      Flores Bowling ,HILL/L  6/17/2022  9:08 AM        Future Appointments   Date Time Provider Katina Barrera   6/17/2022 12:00 PM Leopold Adolph [id number]   6/22/2022 10:30 AM Xiomara Mckeon, PT [id number]   6/22/2022 11:15 AM Neil Lundborg, OTR/L [facility]   6/24/2022 11:15 AM [facility] PT [address] [facility]   6/28/2022 10:30 AM Neil Lundborg, OTR/L [facility]   6/30/2022 11:15 AM Neil Lundborg, OTR/L [facility]

## 2022-06-20 ENCOUNTER — APPOINTMENT (OUTPATIENT)
Dept: PHYSICAL THERAPY | Age: 52
End: 2022-06-20
Payer: MEDICARE
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
## 2022-06-22 ENCOUNTER — APPOINTMENT (OUTPATIENT)
Dept: PHYSICAL THERAPY | Age: 52
End: 2022-06-22
Payer: MEDICARE

## 2022-06-22 ENCOUNTER — HOSPITAL ENCOUNTER (OUTPATIENT)
Dept: PHYSICAL THERAPY | Age: 52
Discharge: HOME | End: 2022-06-22
Payer: MEDICARE

## 2022-06-22 PROCEDURE — 97530 THERAPEUTIC ACTIVITIES: CPT

## 2022-06-22 PROCEDURE — 97112 NEUROMUSCULAR REEDUCATION: CPT

## 2022-06-22 PROCEDURE — 97535 SELF CARE MNGMENT TRAINING: CPT

## 2022-06-22 NOTE — PROGRESS NOTES
PT DAILY TREATMENT NOTE     Patient Name: Tariq Juarez  Date:2022  : 1970  [x]  Patient  Verified  Payor: [facility] MEDICARE / Plan: ALEXIS SHEPHERD [facility] / Product Type: Managed Care Medicare /    In time:10:40A Out time:11:10A  Total Treatment Time (min): 30  Visit #: 1 of 12    Medicare/BCBS Only   Total Timed Codes (min): 30 1:1 Treatment Time:  30       Treatment Area: Weakness of right lower extremity [R29.898]  Weakness of left lower extremity [R29.898]  Other abnormalities of gait and mobility [R26.89]    SUBJECTIVE  Pain Level (0-10 scale): 0/10  Any medication changes, allergies to medications, adverse drug reactions, diagnosis change, or new procedure performed?: [x] No    [] Yes (see summary sheet for update)  Subjective functional status/changes:   [] No changes reported      Patient reports walking without having to hold onto objects has gotten easier. Patient denies things that he needs to work on with therapy but does feel like he could benefit. Patient's spouse reports he has been non compliant with exercises but works with the nurse on functional tasks like walking. Patient's nurse reports he has been trying to work with patient on relaxation for left side, sit to stands as he tends to \"flop\", and leg strengthening. He will lean to the right side when he walks. He needs to work on his balance. He has gotten better with walking longer distances.     OBJECTIVE    30 min Therapeutic Activity:  []  See flow sheet :   Rationale: increase ROM, increase strength, improve coordination, improve balance and increase proprioception  to improve the patients ability to perform ADLs with increased ease and determine therapy plan             With   [] TE   [] TA   [] neuro   [] other: Patient Education: [x] Review HEP    [] Progressed/Changed HEP based on:   [] positioning   [] body mechanics   [] transfers   [] heat/ice application    [] other:      Other Objective/Functional Measures: Session abbreviated-patient arrived to therapy session late and needing to use restroom upon arrival    Romberg 30 sec  MSR 30 sec B  SLS iwht left UE: left 30 sec, right 15 sec   Normal stance foam 30 sec, Romberg foam 15 sec  Ambulation: 158ft with standby assist; decreased right foot clearance progressively and 3 standing rest breaks    FOTO:30/100    Pain Level (0-10 scale) post treatment: 3/10 low back    ASSESSMENT/Changes in Function: SEE RECERTIFICATION    Patient will continue to benefit from skilled PT services to modify and progress therapeutic interventions, address functional mobility deficits, address ROM deficits, address strength deficits, analyze and address soft tissue restrictions, analyze and cue movement patterns, analyze and modify body mechanics/ergonomics, assess and modify postural abnormalities, address imbalance/dizziness and instruct in home and community integration to attain remaining goals. []  See Plan of Care  [x]  See progress note/recertification  []  See Discharge Summary         Progress towards goals / Updated goals:    SEE RECERTIFICATION FOR UPDATED [address] be accomplished in 3 treatments:              1. Patient will report compliance with initial HEP to optimize therapy outcomes. Not compliant (5/18/22)  Long Term Goals: To be accomplished in 4 weeks:              1. Patient will improve FOTO score by at least 5 points in order to demonstrate functional improvement. Progressing-30/100              2. Patient will report no more than \"limited a little\" with \"Moderate activities like moving a table or pushing a vacuum \" with FOTO in order to demonstrate improved tolerance to ADLs. Progressing-limited a lot              3. Patient will be able to ambulate at least 150 ft with no more than SBQC and supervision in order to navigate home with increased ease and safety.  Progressing-see above              4. Patient will be able to perform sit to stand transfer from chair height with no more than left UE assist and mod I in order to perform daily tasks with increased ease.  Progressing-21.5\" with left UE assist to sit with standby assist to [address] (5/24/22)  Progressing-stand by assistance and left UE assist (6/22/22)    PLAN  [x]  Upgrade activities as tolerated     [x]  Continue plan of care  []  Update interventions per flow sheet       []  Discharge due to:_  []  Other:_      Geoff Fermin, PT 6/22/2022  8:53 AM    Future Appointments   Date Time Provider Katina Barrera   6/22/2022 10:30 AM [facility], PT [facility]   6/22/2022 11:15 AM Robert Hu, OTR/L [facility]   6/24/2022 11:15 AM Evens Huynh, ABRAHAM [facility]   6/28/2022 10:30 AM Robert Hu, OTR/L [facility]   6/30/2022 11:15 AM Robert Hu, OTR/L [facility]

## 2022-06-22 NOTE — PROGRESS NOTES
[facility] [employer]  [address]  [phone] fax    Continued Plan of Care/ Re-certification for Physical Therapy Services    Patient name: Conrado Mcknight Start of Care: 2022   Referral source: Duke Galvan MD : 1970   Medical/Treatment Diagnosis: Weakness of right lower extremity [R29.898]  Weakness of left lower extremity [R29.898]  Other abnormalities of gait and mobility [R26.89]  Payor[facility]IC[facility] / [facility] / Product Type: Managed Care Medicare /  Onset Date:CVA ;   COVID-2022       Prior Hospitalization: see medical history Provider#: [id number]   Medications: Verified on Patient Summary List    Comorbidities:  HTN, arthritis, diabetes, heart disease, depresion, visual impairment  Prior Level of Function:ambulating with quad cane; lives in 1 story home with ramp with 3 steps to enter living area; lives with wife and daughter; has shower chair  Visits from Start of Care: 6    Missed Visits: 1    The Plan of Care and following information is based on the patient's current status:  Goal: Patient will report compliance with initial HEP to optimize therapy outcomes. Status at last note/certification: New goal-established at evaluation  Current Status: Progressing    Goal: Patient will improve FOTO score by at least 5 points in order to demonstrate functional improvement. Status at last note/certification: New VQEY-  Current Status: Progressing-30/100    Goal: Patient will report no more than \"limited a little\" with \"Moderate activities like moving a table or pushing a vacuum \" with FOTO in order to demonstrate improved tolerance to ADLs.   Status at last note/certification: New goal-\"limited at lot\"  Current Status: Progressing-\"limited a lot\"    Goal: Patient will be able to ambulate at least 150 ft with no more than [place] and supervision in order to navigate home with increased ease and safety. Status at last note/certification: New goal-decreased gait speed and decreased right hip flex/knee flex/ankle DF contributing to decreased foot clearance; patient compensates with left lateral trunk lean, hip circumduction, and increased inversion on right. Patient with AFO donned on right; CGA and cues for cane placement to get all points in contact with floor with ambulation  Current Status: Progressing-158ft with standby assist; decreased right foot clearance progressively and 3 standing rest breaks; good sequencing with quad cane    Goal: Patient will be able to perform sit to stand transfer from chair height with no more than left UE assist and mod I in order to perform daily tasks with increased ease. Status at last note/certification: New ZHWL-63\" plinth height with left UE support followed by 20\" height with B hands placed on knee; decreased eccentric control and standby to [address] for transfer  Current Status: Progressing-supervision to standby assist for safety from [address] functional changes: subjective reports of improvement, increased ambulation tolerance/endurance      Problems/ barriers to goal attainment: fair compliance with HEP; prolonged time since CVA     Problem List: pain affecting function, decrease ROM, decrease strength, impaired gait/ balance, decrease ADL/ functional abilitiies, decrease activity tolerance, decrease flexibility/ joint mobility and decrease transfer abilities    Treatment Plan: Therapeutic exercise, Therapeutic activities, Neuromuscular re-education, Physical agent/modality, Gait/balance training, Manual therapy, Patient education, Self Care training, Functional mobility training, Home safety training and Stair training     Patient Goal (s) has been updated and includes: \"to have better balance\"     Goals for this certification period to be accomplished in 4 weeks:  1. Patient and spouse will report compliance with HEP to optimize therapy outcomes.   2. Patient will improve FOTO score to at least 39/100 points to demonstrate functional improvement. 3. Patient will report no more than \"limited a little\" with \"Moderate activities like moving a table or pushing a vacuum \" with FOTO in order to demonstrate improved tolerance to ADLs. 4. Patient will be able to perform sit to stand transfer from chair height with no more than left UE assist and mod I in order to perform daily tasks with increased ease. 5. Patient will be able to ambulate at least 150 ft with no more than [facility] and supervision in order to navigate home with increased ease and safety. 6. Patient will be able perform Romberg stance on foam for at least 30 sec in order to navigate uneven surfaces with increased ease. Frequency / Duration: Patient to be seen 2-3 times per week for 4 weeks:    Assessment / Recommendations: Patient making slow, steady progress towards goals. Patient reports walking without having to hold onto objects has gotten easier. Patient's spouse reports he has been non-compliant with exercises but works with the nurse on functional tasks like walking and transfers. Patient's nurse reports he has been trying to work with patient on relaxation for left side, sit to stands( as he tends to \"flop\"), and leg strengthening. Patient will lean to the right side when he walks but overall endurance with walking longer distances has greatly improved. Nurse reports patient needs to work on balance. FOTO score has decreased to 30/100 points. Patient was able to maintain Romberg and MSR stance for 30 sec B; with left UE support patient able to maintain SLS for 30 sec on left and 15 sec on right. He was able to maintain balance on airex for 30 sec with normal stance and 15 sec with Romberg stance.  He demonstrates overall improvement with stability and endurance with ambulation with improved ability to place all points down on floor with cane and without needing to hold onto walls with right UE. He continues to demonstrate increased fatigue with prolonged ambulation contributing to worsening foot clearance and need for standing rest breaks. He demonstrates significant weakness of the right hip flexors and quads needed for right LE advancement during gait. He demonstrates B hip flexor contractures and hamstring tightness limiting ease of upright posture. Patient demonstrates poor eccentric control with sitting with increase fatigue. Patient would benefit from continued skilled outpatient PT to address above mentioned deficits to return to prior level of function, increase independence with ADLs, and improve overall quality of life. Certification Period: 6/3/22-7/2/22    Desean Ba, PT 6/22/2022 12:25 PM    ________________________________________________________________________  I certify that the above Therapy Services are being furnished while the patient is under my care. I agree with the treatment plan and certify that this therapy is necessary. [] I have read the above and request that my patient continue as recommended.   [] I have read the above report and request that my patient continue therapy with the following changes/special instructions: _______________________________________  [] I have read the above report and request that my patient be discharged from therapy    Physician's Signature:____________Date:_________TIME:________     Steven Otero MD  ** Signature, Date and Time must be completed for valid certification **    Please sign and return to [facility] [employer]  [address] [place] [address]  [phone] fax
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
## 2022-06-23 ENCOUNTER — TELEPHONE (OUTPATIENT)
Dept: PHYSICAL THERAPY | Age: 52
End: 2022-06-23

## 2022-06-24 ENCOUNTER — APPOINTMENT (OUTPATIENT)
Dept: PHYSICAL THERAPY | Age: 52
End: 2022-06-24
Payer: MEDICARE

## 2022-06-27 ENCOUNTER — APPOINTMENT (OUTPATIENT)
Dept: PHYSICAL THERAPY | Age: 52
End: 2022-06-27
Payer: MEDICARE

## 2022-06-28 ENCOUNTER — APPOINTMENT (OUTPATIENT)
Dept: PHYSICAL THERAPY | Age: 52
End: 2022-06-28
Payer: MEDICARE

## 2022-06-29 ENCOUNTER — APPOINTMENT (OUTPATIENT)
Dept: PHYSICAL THERAPY | Age: 52
End: 2022-06-29
Payer: MEDICARE

## 2022-06-30 ENCOUNTER — APPOINTMENT (OUTPATIENT)
Dept: PHYSICAL THERAPY | Age: 52
End: 2022-06-30
Payer: MEDICARE

## 2022-07-01 ENCOUNTER — APPOINTMENT (OUTPATIENT)
Dept: PHYSICAL THERAPY | Age: 52
End: 2022-07-01
Payer: MEDICARE

## 2022-07-01 ENCOUNTER — HOSPITAL ENCOUNTER (OUTPATIENT)
Dept: PHYSICAL THERAPY | Age: 52
Discharge: HOME | End: 2022-07-01
Payer: MEDICARE

## 2022-07-01 PROCEDURE — 97530 THERAPEUTIC ACTIVITIES: CPT

## 2022-07-01 PROCEDURE — 97112 NEUROMUSCULAR REEDUCATION: CPT

## 2022-07-01 NOTE — PROGRESS NOTES
PT DAILY TREATMENT NOTE     Patient Name: Aurea Woods  Date:2022  : 1970  [x]  Patient  Verified  Payor: [facility] MEDICARE / Plan: [facility] / Product Type: Managed Care Medicare /    In time: 10:26A  Out time:11:12a  Total Treatment Time (min): 46  Visit #: 2 of 12    Medicare/BCBS Only   Total Timed Codes (min):  46 1:1 Treatment Time: 42       Treatment Area: Weakness of right lower extremity [R29.898]  Weakness of left lower extremity [R29.898]  Other abnormalities of gait and mobility [R26.89]    SUBJECTIVE  Pain Level (0-10 scale): 0/10  Any medication changes, allergies to medications, adverse drug reactions, diagnosis change, or new procedure performed?: [x] No    [] Yes (see summary sheet for update)  Subjective functional status/changes:   [] No changes reported      Patient reports he felt good after last visit. Spouse and nurse reports patient still needs to work on balance.      OBJECTIVE      28 min Therapeutic Activity:  [x]  See flow sheet : vitals assessment, sit to stands; patient/family/caregiver education, bed mobility   Rationale: increase ROM, increase strength, improve coordination, improve balance and increase proprioception  to improve the patients ability to perform daily tasks with increased ease     18 min Neuromuscular Re-education:  [x]  See flow sheet : standing balance, PNF, tapping on muscles/assist for control with exercises   Rationale: increase ROM, increase strength, improve coordination, improve balance and increase proprioception  to improve the patients ability to perform daily tasks with increased ease         With   [] TE   [x] TA   [] neuro   [] other: Patient Education: [x] Review HEP    [] Progressed/Changed HEP based on:   [] positioning   [] body mechanics   [] transfers   [] heat/ice application    [x] other: having patient ensure his LEs are against seat and having him wait to sit in addition to providing verbal cues to lower slowly to seat; ability to provide assistance for active hip flex activities but able to apply resistance for ecc strength/control; having patient do as much as able with transfers; wearing AFO when ambulating to improve control/stability      Other Objective/Functional Measures:     /82, HR 77 bpm, O2 sat 97%   Labored breathing during balance exercises  Increased supination of right foot with standing on foam but able to stand with foot flat on flat surface  Improved control of right LE once AFO donned  Normal stance with eyes closed: 30 sec  Poor eccentric control with sitting to WC  Sit to stands: min A progressing to just verbal cues for ecc control with lowering to 21\" plinth and cues   Bed mobility-unable to achieve prone position on plinth; possible trial of activity on large mat table in future visits  CGA for scooting and CGA to min A for turning to manage right LE  Unable to perform seated [address]  Min A for end range with LAQ and sidelying HSC improved with repetitions  Improved hip flex ROM with PNF in supine with repetitions  Sitting to WC at end of session: CGA and verbal cues to lower slowly; decreased ecc control improved control within greater range       Pain Level (0-10 scale) post treatment: 0/10    ASSESSMENT/Changes in Function: Patient making slow, steady progress towards goals. He demonstrates improving static standing balance on flat surfaces but demonstrates impaired balance on uneven surfaces. Patient demonstrates decreased eccentric control/increased impulsivity with sitting contributing to decreased safety with transfers. Patient requires min A for bed mobility to manage right UE and has max difficulty with achieving prone positioning. Ability to achieve fully upright posture.      Patient will continue to benefit from skilled PT services to modify and progress therapeutic interventions, address functional mobility deficits, address ROM deficits, address strength deficits, analyze and address soft tissue restrictions, analyze and cue movement patterns, analyze and modify body mechanics/ergonomics, assess and modify postural abnormalities, address imbalance/dizziness and instruct in home and community integration to attain remaining goals. Progress towards goals / Updated goals:  1. Patient and spouse will report compliance with HEP to optimize therapy outcomes. 2. Patient will improve FOTO score to at least 39/100 points to demonstrate functional improvement. 3. Patient will report no more than \"limited a little\" with \"Moderate activities like moving a table or pushing a vacuum \" with FOTO in order to demonstrate improved tolerance to ADLs. 4. Patient will be able to perform sit to stand transfer from chair height with no more than left UE assist and mod I in order to perform daily tasks with increased ease. 5. Patient will be able to ambulate at least 150 ft with no more than [employer] [facility] and supervision in order to navigate home with increased ease and safety.   6. Patient will be able perform Romberg stance on foam for at least 30 sec in order to navigate uneven surfaces with increased ease.        PLAN  [x]  Upgrade activities as tolerated     [x]  Continue plan of care  []  Update interventions per flow sheet       []  Discharge due to:_  []  Other:_      Sang Sahu PT 7/1/2022  8:55 AM    Future Appointments   Date Time Provider Katina Barrera   7/1/2022 10:30 AM Xiomara Loyd, PT [facility]   7/5/2022 11:15 AM Sammi Mead PTA [facility]

## 2022-07-05 ENCOUNTER — HOSPITAL ENCOUNTER (OUTPATIENT)
Dept: PHYSICAL THERAPY | Age: 52
Discharge: HOME | End: 2022-07-05
Payer: MEDICARE

## 2022-07-05 PROCEDURE — 97110 THERAPEUTIC EXERCISES: CPT

## 2022-07-05 PROCEDURE — 97530 THERAPEUTIC ACTIVITIES: CPT

## 2022-07-05 NOTE — PROGRESS NOTES
PT DAILY TREATMENT NOTE     Patient Name: Malia Luceor  Date:2022  : 1970  [x]  Patient  Verified  Payor: [facility] MEDICARE / [facility] / Product Type: Managed Care Medicare /    In time: 11:20  Out time: 12:20  Total Treatment Time (min): 60  Visit #: 3 of 12    Medicare/BCBS Only   Total Timed Codes (min):  60 1:1 Treatment Time:60        Treatment Area: Weakness of right lower extremity [R29.898]  Weakness of left lower extremity [R29.898]  Other abnormalities of gait and mobility [R26.89]    SUBJECTIVE  Pain Level (0-10 scale): 0/10  Any medication changes, allergies to medications, adverse drug reactions, diagnosis change, or new procedure performed?: [x] No    [] Yes (see summary sheet for update)  Subjective functional status/changes:   [] No changes reported  Pt doesn't have any pain at rest but has increased B hip pain with movement. His nurse has been helping with exercises at home and notes the pt is more motivated to participate with exercises. Pt reports having his hip looked at years ago but nothing recently. His wife states he doesn't complain of pain so she usually doesn't know.      OBJECTIVE      30 min Therapeutic Activity:  [x]  See flow sheet : pt/family education; sit to stands    Rationale: increase ROM, increase strength, improve coordination, improve balance and increase proprioception  to improve the patients ability to perform daily tasks with increased ease     30 min Therapeutic Exercise:  [x]  See flow sheet :    Rationale: increase ROM, increase strength, improve coordination, improve balance and increase proprioception  to improve the patients ability to perform daily tasks with increased ease         With   [] TE   [] TA   [] neuro   [] other: Patient Education: [x] Review HEP    [] Progressed/Changed HEP based on:   [] positioning   [] body mechanics   [] transfers   [] heat/ice application    [] other:      Other Objective/Functional Measures: Firm end feel with hip flexion on the left  Left hip flexion contracture  Complete loss of left hip IR  Educated family on ortho follow up to see what can be done to assist with left hip pain limiting upright posture, right step length and participation in exercises  Improved right leg strength noted with exercises  Educated on nurse assisting with slow progressive stretching of the left hip within painfree range to slowly straighten out    Pain Level (0-10 scale) post treatment: 0/10    ASSESSMENT/Changes in Function: Pt making slow progress towards goals due to ongoing left hip pain and loss of ROM. He is unable to take an adequate right step during gait due to loss of left hip mobility. He also lacks strength for sit to stands and upright posture due to hip flexion contracture left>right. He has improving right LE activation and has been motivated to strength train. Suggested follow up with MD to ask about pain relief options for left hip to aide in therapy progression. Patient will continue to benefit from skilled PT services to modify and progress therapeutic interventions, address functional mobility deficits, address ROM deficits, address strength deficits, analyze and address soft tissue restrictions, analyze and cue movement patterns, analyze and modify body mechanics/ergonomics, assess and modify postural abnormalities, address imbalance/dizziness and instruct in home and community integration to attain remaining goals. Progress towards goals / Updated goals:  1. Patient and spouse will report compliance with HEP to optimize therapy outcomes. 2. Patient will improve FOTO score to at least 39/100 points to demonstrate functional improvement. 3. Patient will report no more than \"limited a little\" with \"Moderate activities like moving a table or pushing a vacuum \" with FOTO in order to demonstrate improved tolerance to ADLs.   4. Patient will be able to perform sit to stand transfer from chair height with no more than left UE assist and mod I in order to perform daily tasks with increased ease. 5. Patient will be able to ambulate at least 150 ft with no more than [facility] and supervision in order to navigate home with increased ease and safety.   6. Patient will be able perform Romberg stance on foam for at least 30 sec in order to navigate uneven surfaces with increased ease.        PLAN  [x]  Upgrade activities as tolerated     [x]  Continue plan of care  []  Update interventions per flow sheet       []  Discharge due to:_  []  Other:_      Jomar Guo PTA 7/5/2022  8:55 AM    Future Appointments   Date Time Provider Katina Holly   7/5/2022 11:15 AM Wesley Fabian PTA [facility]

## 2022-07-06 ENCOUNTER — HOSPITAL ENCOUNTER (OUTPATIENT)
Dept: PHYSICAL THERAPY | Age: 52
Discharge: HOME | End: 2022-07-06
Payer: MEDICARE

## 2022-07-06 PROCEDURE — 97112 NEUROMUSCULAR REEDUCATION: CPT

## 2022-07-06 PROCEDURE — 97110 THERAPEUTIC EXERCISES: CPT

## 2022-07-06 PROCEDURE — 97530 THERAPEUTIC ACTIVITIES: CPT

## 2022-07-06 NOTE — PROGRESS NOTES
PT DAILY TREATMENT NOTE     Patient Name: Raymond Paredes  Date:2022  : 1970  [x]  Patient  Verified  Payor: [facility] MEDICARE / Plan: [facility] / Product Type: Managed Care Medicare /    In time: 11:15  Out time: 12:05  Total Treatment Time (min): 50  Visit #: 2 of 18    Medicare/BCBS Only   Total Timed Codes (min):  50 1:1 Treatment Time: 50       Treatment Area: Weakness of right lower extremity [R29.898]  Weakness of left lower extremity [R29.898]  Other abnormalities of gait and mobility [R26.89]    SUBJECTIVE  Pain Level (0-10 scale): 0/10  Any medication changes, allergies to medications, adverse drug reactions, diagnosis change, or new procedure performed?: [x] No    [] Yes (see summary sheet for update)  Subjective functional status/changes:   [] No changes reported  Pt reports no current pain. He was sore after his last session. His wife reports she left a message for the MD about seeing him for his hips left>right. They are trying to work on stretches and exercises at home but the nurse will be gone on vacation this next week.     OBJECTIVE      20 min Therapeutic Activity:  [x]  See flow sheet : goal reassessment   Rationale: increase ROM, increase strength, improve coordination, improve balance and increase proprioception  to improve the patients ability to perform daily tasks with increased ease     20 min Therapeutic Exercise:  [x]  See flow sheet :    Rationale: increase ROM, increase strength, improve coordination, improve balance and increase proprioception  to improve the patients ability to perform daily tasks with increased ease    10 min Neuromuscular Re-education:  [x]  See flow sheet : core re-ed   Rationale: increase ROM, increase strength, improve coordination, improve balance and increase proprioception  to improve the patients ability to perform daily tasks with increased ease         With   [] TE   [] TA   [] neuro   [] other: Patient Education: [x] Review HEP    [] Progressed/Changed HEP based on:   [] positioning   [] body mechanics   [] transfers   [] heat/ice application    [] other:      Other Objective/Functional Measures:   HEP compliance: assistance from nurse daily  FOTO: 36/100  Sit to stand with 1 UE: able to complete from w/c  Romberg stance on foam: unable  Ambulation with SBQC with supervision: decreased step length due to left hip flexion contracture  Left hip extension AROM: 25 degrees from 0    Pain Level (0-10 scale) post treatment: 0/10    ASSESSMENT/Changes in Function: See Recertification. Patient will continue to benefit from skilled PT services to modify and progress therapeutic interventions, address functional mobility deficits, address ROM deficits, address strength deficits, analyze and address soft tissue restrictions, analyze and cue movement patterns, analyze and modify body mechanics/ergonomics, assess and modify postural abnormalities, address imbalance/dizziness and instruct in home and community integration to attain remaining goals. Progress towards goals / Updated goals:  1. Patient and spouse will report compliance with HEP to optimize therapy outcomes. Met  2. Patient will improve FOTO score to at least 39/100 points to demonstrate functional improvement. Progressing 36/100  3. Patient will report no more than \"limited a little\" with \"Moderate activities like moving a table or pushing a vacuum \" with FOTO in order to demonstrate improved tolerance to ADLs. \"limited a lot\"  4. Patient will be able to perform sit to stand transfer from chair height with no more than left UE assist and mod I in order to perform daily tasks with increased ease. Progressing 1 UE assist from w/c  5. Patient will be able to ambulate at least 150 ft with no more than [facility] and supervision in order to navigate home with increased ease and safety. Decreased right step length due to left hip pain and flexion contracture  6.  Patient will be able perform Romberg stance on foam for at least 30 sec in order to navigate uneven surfaces with increased ease.  Unable        PLAN  [x]  Upgrade activities as tolerated     [x]  Continue plan of care  []  Update interventions per flow sheet       []  Discharge due to:_  []  Other:_      Nikko Pruitt PTA 7/6/2022  8:55 AM    Future Appointments   Date Time Provider Katina Barrera   7/6/2022 11:15 AM Joseline Hay PTA [facility]   7/13/2022 12:00 PM Nicolas Dasilva [facility]   7/22/2022 11:15 AM Xiomara Molina, PT [facility]

## 2022-07-07 NOTE — PROGRESS NOTES
[facility] [place] [employer] [facility]  [address] [facility]  [phone] fax    Continued Plan of Care/ Re-certification for Physical Therapy Services    Patient name: Malia Lucero Start of Care: 2022   Referral source: Julia Silva MD : 1970   Medical/Treatment Diagnosis: Weakness of right lower extremity [R29.898]  Weakness of left lower extremity [R29.898]  Other abnormalities of gait and mobility [R26.89]  Payor: [facility] MEDICARE / [facility] / Product Type: Managed Care Medicare /  Onset Date:2007;   COVID-2022      Prior Hospitalization: see medical history Provider#: [id number]   Medications: Verified on Patient Summary List    Comorbidities:  HTN, arthritis, diabetes, heart disease, depresion, visual impairment  Prior Level of Function:ambulating with quad cane; lives in 1 story home with ramp with 3 steps to enter living area; lives with wife and daughter; has shower chair  Visits from Start of Care: 9    Missed Visits: 1    The Plan of Care and following information is based on the patient's current status:  Goal: Patient will report compliance with initial HEP to optimize therapy outcomes. Status at last note/certification: Initiated/reviewed  Current Status: met working with aide daily on strengthening/stretching    Goal: Patient will improve FOTO score to at least 39/100 points to demonstrate functional improvement. Status at last note/certification:   Current Status: not met 36/100    Goal: Patient will report no more than \"limited a little\" with \"Moderate activities like moving a table or pushing a vacuum \" with FOTO in order to demonstrate improved tolerance to ADLs.   Status at last note/certification: \"limited a lot\"  Current Status: not met \"limited a lot\"    Goal: Patient will be able to perform sit to stand transfer from chair height with no more than left UE assist and mod I in order to perform daily tasks with increased ease. Status at last note/certification:  supervision to standby assist for safety from [facility]  Current Status: met 1 UE assist from w/c    Goal: Patient will be able to ambulate at least 150 ft with no more than [facility] and supervision in order to navigate home with increased ease and safety. Status at last note/certification:158ft with standby assist; decreased right foot clearance progressively and 3 standing rest breaks; good sequencing with quad cane  Current Status: not met decreased step length due to left hip flexion contracture    Goal: Patient will be able perform Romberg stance on foam for at least 30 sec in order to navigate uneven surfaces with increased ease. Status at last note/certification: unable  Current Status: not met unable    Key functional changes: improving HEP compliance with assistance of aide at home; FOTO 36/100    Problems/ barriers to goal attainment: hip pain and left hip flexion contracture    Problem List: pain affecting function, decrease ROM, decrease strength, edema affecting function, impaired gait/ balance, decrease ADL/ functional abilitiies, decrease activity tolerance, decrease flexibility/ joint mobility and decrease transfer abilities    Treatment Plan: Therapeutic exercise, Therapeutic activities, Neuromuscular re-education, Physical agent/modality, Gait/balance training, Manual therapy, Patient education, Self Care training, Functional mobility training, Home safety training and Stair training     Patient Goal (s) has been updated and includes: \"improved ease of walking\"     Goals for this certification period to be accomplished in 6 weeks:  1. Patient will increase FOTO score to at least 39/100 to demonstrate improvement in functional mobility.    2. Patient will report no more than  \"limited a little\" when asked on the FOTO questionnaire, \"How much difficulty do you have performing moderate activities like moving a table or pushing a vacuum ? \" to improve participation in household chores. 3. Patient will improve left hip extension AROM to at least to 10 degrees from neutral to improve ease of right step length for ambulation with decreased assistance. 4. Patient will be able perform Romberg stance on foam for at least 30 sec in order to navigate uneven surfaces with increased ease. Frequency / Duration: Patient to be seen 2-3 times per week for 6 weeks:    Assessment / Recommendations: Mr. Skye Whiting is making slow, steady progress towards goals with improved HEP compliance with assistance of aide at the house. He reports B hip pain left>right with significant loss in left hip IR and flexion mobility but also a 25 degree left hip flexion contracture. He ambulates with decreased right step length due to left hip tightness and also due to right LE weakness. He struggles with progression of standing exercises and balance due to hip limitations and pain. He is motivated during sessions to participate in strengthening with muscle activation in the quads, hamstrings and glutes on the right LE. He had minor improvement in FOTO score at 36/100. He is able to perform a sit to stand transfer with 1 UE assist from the w/c with SBA. Patient demonstrates decreased eccentric control with sitting to lowered seat heights. Skilled PT remains medically necessary to reduce pain, improve mobility and increase strength for ease of transfers and ambulation with decreased fall risk and to lessen caregiver burden. Certification Period: 7/5/22 to 8/3/2022    Dominique Boldenr, PTA 7/7/2022 9:47 AM    ________________________________________________________________________  I certify that the above Therapy Services are being furnished while the patient is under my care. I agree with the treatment plan and certify that this therapy is necessary. [] I have read the above and request that my patient continue as recommended.   [] I have read the above report and request that my patient continue therapy with the following changes/special instructions: _______________________________________  [] I have read the above report and request that my patient be discharged from therapy    Physician's Signature:____________Date:_________TIME:________     Eric Hannon MD  ** Signature, Date and Time must be completed for valid certification **    Please sign and return to [facility]  [address] [place] [address]  [phone] fax

## 2022-07-13 ENCOUNTER — HOSPITAL ENCOUNTER (OUTPATIENT)
Dept: PHYSICAL THERAPY | Age: 52
Discharge: HOME | End: 2022-07-13
Payer: MEDICARE

## 2022-07-13 PROCEDURE — 97110 THERAPEUTIC EXERCISES: CPT

## 2022-07-13 PROCEDURE — 97112 NEUROMUSCULAR REEDUCATION: CPT

## 2022-07-13 NOTE — PROGRESS NOTES
PT DAILY TREATMENT NOTE     Patient Name: Nick Lee  Date:2022  : 1970  [x]  Patient  Verified  Payor: [facility] MEDICARE / Plan: ALEXIS SHEPHERD [facility] / Product Type: Managed Care Medicare /    In time: 11:55  Out time: 12:42  Total Treatment Time (min): 47  Visit #: 1 of     Medicare/BCBS Only   Total Timed Codes (min):  47 1:1 Treatment Time:  40       Treatment Area: Weakness of right lower extremity [R29.898]  Weakness of left lower extremity [R29.898]  Other abnormalities of gait and mobility [R26.89]    SUBJECTIVE  Pain Level (0-10 scale): 0/10  Any medication changes, allergies to medications, adverse drug reactions, diagnosis change, or new procedure performed?: [x] No    [] Yes (see summary sheet for update)  Subjective functional status/changes:   [] No changes reported  Pt reports doing ok. He     OBJECTIVE    32 min Therapeutic Exercise:  [x] See flow sheet :   Rationale: increase ROM and increase strength to improve the patients ability to amb & perform ADLs with more eaes    15 min Neuromuscular Re-education:  [x]  See flow sheet :   Rationale: improve coordination, improve balance and increase proprioception  to improve the patients ability to amb with safety & ease          With   [] TE   [] TA   [] neuro   [] other: Patient Education: [x] Review HEP    [] Progressed/Changed HEP based on:   [] positioning   [] body mechanics   [] transfers   [] heat/ice application    [] other:      Other Objective/Functional Measures:    Poor strength of Right knee, hip flexors   Max challenged with stand to sit   Compensated with hip hiking and audible back pop with step tap   Able to amb with step through pattern in parallel bars     Pain Level (0-10 scale) post treatment: 0/10    ASSESSMENT/Changes in Function: Pt's making gradual process with PT. He was significantly challenged with current therex & balance activities due to Right hips tightness and weakness.  Pt deemed a little emotional today. Will progress as tolerated. Patient will continue to benefit from skilled PT services to modify and progress therapeutic interventions, address functional mobility deficits, address ROM deficits, address strength deficits, analyze and address soft tissue restrictions, analyze and cue movement patterns, analyze and modify body mechanics/ergonomics, assess and modify postural abnormalities, address imbalance/dizziness and instruct in home and community integration to attain remaining goals. []  See Plan of Care  [x]  See progress note/recertification  []  See Discharge Summary         Progress towards goals / Updated goals:  Goals for this certification period to be accomplished in 6 weeks:  1. Patient will increase FOTO score to at least 39/100 to demonstrate improvement in functional mobility. 2. Patient will report no more than  \"limited a little\" when asked on the FOTO questionnaire, \"How much difficulty do you have performing moderate activities like moving a table or pushing a vacuum ? \" to improve participation in household chores. 3. Patient will improve left hip extension AROM to at least to 10 degrees from neutral to improve ease of right step length for ambulation with decreased assistance.    4. Patient will be able perform Romberg stance on foam for at least 30 sec in order to navigate uneven surfaces with increased ease. progressing gradually, min LOB 7-13-22    PLAN  [x]  Upgrade activities as tolerated     [x]  Continue plan of care  []  Update interventions per flow sheet       []  Discharge due to:_  []  Other:_      Eric Holiday 7/13/2022  8:08 AM    Future Appointments   Date Time Provider Katina Barrera   7/13/2022 12:00 PM Zachery RIOS [facility]   7/22/2022 11:15 AM Xiomara Campbell Song, PT [facility]

## 2022-07-22 ENCOUNTER — HOSPITAL ENCOUNTER (OUTPATIENT)
Dept: PHYSICAL THERAPY | Age: 52
Discharge: HOME | End: 2022-07-22
Payer: MEDICARE

## 2022-07-22 PROCEDURE — 97112 NEUROMUSCULAR REEDUCATION: CPT

## 2022-07-22 NOTE — PROGRESS NOTES
PT DAILY TREATMENT NOTE     Patient Name: Clara Brady  Date:2022  : 1970  [x]  Patient  Verified  Payor: [facility] MEDICARE / Plan: ALEXIS SHEPHERD [facility] / Product Type: Managed Care Medicare /    In time:11:35A  Out time:12:02P  Total Treatment Time (min): 27  Visit #: 4 of     Medicare/BCBS Only   Total Timed Codes (min):  27 1:1 Treatment Time:         Treatment Area: Weakness of right lower extremity [R29.898]  Weakness of left lower extremity [R29.898]  Other abnormalities of gait and mobility [R26.89]    SUBJECTIVE  Pain Level (0-10 scale): 0/10  Any medication changes, allergies to medications, adverse drug reactions, diagnosis change, or new procedure performed?: [x] No    [] Yes (see summary sheet for update)  Subjective functional status/changes:   [] No changes reported    Patient reports he feels pretty good today. He is not really having pain anywhere. He cannot think of anything he really needs to work on. His nurse reports patient needs to work on balance as his right foot will still \"get caught up\" when he stands and walks. His wife has not yet heard anything from the orthopedist regarding patient's hip pain.     OBJECTIVE    27 min Neuromuscular Re-education:  []  See flow sheet :   Rationale: increase ROM, increase strength, improve coordination, improve balance, and increase proprioception  to improve the patients ability to perform daily tasks and ambulate with increased ease/safety            With   [] TE   [] TA   [] neuro   [] other: Patient Education: [x] Review HEP    [] Progressed/Changed HEP based on:   [] positioning   [] body mechanics   [] transfers   [] heat/ice application    [] other:      Other Objective/Functional Measures:   Tactile and verbal cues for eccentric control with sitting at start of session to sit to [facility] to be brought back to therapy gym-improved ecc control  Cone taps-left UE support with patient attempting to tap foot to cone (vs on top of cone)  Decreased reaction time and very limited foot clearance with backwards step on right to return to starting position without tactile cues/min A  Attempted cone reach to floor-patient with LOB but able to catch self using UEs on parallel bars  Trialed cone reaches to 8\" and 12\" step height (along with passing cones to nurse/therapist and stacking cones on command)  Cues for upright posture with standing to pass cones   Patient reported increased back pain requiring patient to sit  Able to achieve full right knee ext in sitting  SLS: left UE support for each rep; pt holding onto parallel bar with 1st rep and performed 2nd rep with palm flat on top of bar  Able to perform without LOB but increased lateral trunk lean  Upon sitting following activities patient kept left UE on parallel bar and sat with LEs far from [facility] with poor eccentric control  Patient denied hip pain during therapy session  Educated patient and nurse on ability to trial activities performed today at home to tolerance to vary activities  Advised spouse to call to follow up with specialist as it has been ~2 weeks or more since she has heard from them      Pain Level (0-10 scale) post treatment: 0/10    ASSESSMENT/Changes in Function: Patient continues to demonstrate decreased safety awareness leading to decreased eccentric control/stability with sitting to chairs (worse with fatigue). He continues to demonstrate decreased right LE control leading to difficulty with right step with ambulation and with activities performed today. Right knee ext strength and SL stability with UE support are slowly improving. Patient continues to be limited with achieving upright posture secondary to hip flex contractures and posterior kinetic chain weakness. Patient remains limited with ambulation/activities at home secondary to left hip pain.      Patient will continue to benefit from skilled PT services to modify and progress therapeutic interventions, address functional mobility deficits, address ROM deficits, address strength deficits, analyze and address soft tissue restrictions, analyze and cue movement patterns, analyze and modify body mechanics/ergonomics, assess and modify postural abnormalities, address imbalance/dizziness, and instruct in home and community integration to attain remaining goals. Progress towards goals / Updated goals:  1. Patient will increase FOTO score to at least 39/100 to demonstrate improvement in functional mobility. 2. Patient will report no more than  \"limited a little\" when asked on the FOTO questionnaire, \"How much difficulty do you have performing moderate activities like moving a table or pushing a vacuum ? \" to improve participation in household chores. 3. Patient will improve left hip extension AROM to at least to 10 degrees from neutral to improve ease of right step length for ambulation with decreased assistance. 4. Patient will be able perform Romberg stance on foam for at least 30 sec in order to navigate uneven surfaces with increased ease.  progressing gradually, min LOB 7-13-22       PLAN  []  Upgrade activities as tolerated     []  Continue plan of care  []  Update interventions per flow sheet       []  Discharge due to:_  []  Other:_      [place], PT 7/22/2022  9:32 AM    Future Appointments   Date Time Provider Katina Holly   7/22/2022 11:15 AM Xiomara Joyce, PT [facility]   7/29/2022 10:00 AM Elizabeth Arcos MD [facility]

## 2022-08-05 ENCOUNTER — HOSPITAL ENCOUNTER (OUTPATIENT)
Dept: PHYSICAL THERAPY | Age: 52
Discharge: HOME | End: 2022-08-05
Payer: MEDICARE

## 2022-08-05 PROCEDURE — 97110 THERAPEUTIC EXERCISES: CPT

## 2022-08-05 PROCEDURE — 97530 THERAPEUTIC ACTIVITIES: CPT

## 2022-08-05 PROCEDURE — 97112 NEUROMUSCULAR REEDUCATION: CPT

## 2022-08-05 NOTE — PROGRESS NOTES
PT DAILY TREATMENT NOTE     Patient Name: Yasmine Becerra  Date:2022  : 1970  [x]  Patient  Verified  Payor: [employer] MEDICARE / [facility] Product Type: Managed Care Medicare /    In time: 12:49  Out time: 1:40  Total Treatment Time (min): 51   Visit #: 5 of     Medicare/BCBS Only   Total Timed Codes (min):  51 1:1 Treatment Time: 51       Treatment Area: Weakness of right lower extremity [R29.898]  Weakness of left lower extremity [R29.898]  Other abnormalities of gait and mobility [R26.89]    SUBJECTIVE  Pain Level (0-10 scale): 0/10  Any medication changes, allergies to medications, adverse drug reactions, diagnosis change, or new procedure performed?: [x] No    [] Yes (see summary sheet for update)  Subjective functional status/changes:   [] No changes reported  Pt reports no current pain. His wife and nurse state the pt has refused to do exercises at home stating he doesn't need them. They have also struggled with having him wear AFO as he feels he doesn't need it. They states he holds furniture and walls to walk around and they are scare he will fall. They have not called to reschedule his hip appt that they had to cancel.      OBJECTIVE    16 min Neuromuscular Re-education:  [x]  See flow sheet :   Rationale: increase ROM, increase strength, improve coordination, improve balance, and increase proprioception  to improve the patients ability to perform daily tasks and ambulate with increased ease/safety    15 min Therapeutic Exericse:  [x]  See flow sheet :   Rationale: increase ROM, increase strength, improve coordination, improve balance, and increase proprioception  to improve the patients ability to perform daily tasks and ambulate with increased ease/safety    20 min Therapeutic Activity:  [x]  See flow sheet :   Rationale: increase ROM, increase strength, improve coordination, improve balance, and increase proprioception  to improve the patients ability to perform daily tasks and ambulate with increased ease/safety            With   [] TE   [] TA   [] neuro   [] other: Patient Education: [x] Review HEP    [] Progressed/Changed HEP based on:   [] positioning   [] body mechanics   [] transfers   [] heat/ice application    [] other:      Other Objective/Functional Measures: FOTO: time constraint  Left hip extension AROM: 25 degree from 0  Romberg stance: min LOB  Increased time spent counseling family and patient regarding need to show improvement to continue PT and pt compliance at home  Max education regarding getting left hip evaluated to assess pain and arthritic changes  Utilized football for pt encouragement to work on standing weight shifts moving the ball to targets and sitting unsupported using the football for core exercises to improve participation and limit boredom  Educated nurse to find ways to use the football at home to help with motivation  In standing pt unable to weight shift to the left in order to kick with the right the ball  In sitting he can perform seated hip flexion and in supine he can perform a SLR with extensor lag  Educated on importance of using AFO for foot clearance but also stretching right calf   Educated on continued left hip flexor stretching to improve ease of upright posture    Pain Level (0-10 scale) post treatment: 0/10    ASSESSMENT/Changes in Function: See Recertification. Patient will continue to benefit from skilled PT services to modify and progress therapeutic interventions, address functional mobility deficits, address ROM deficits, address strength deficits, analyze and address soft tissue restrictions, analyze and cue movement patterns, analyze and modify body mechanics/ergonomics, assess and modify postural abnormalities, address imbalance/dizziness, and instruct in home and community integration to attain remaining goals. Progress towards goals / Updated goals:  1.  Patient will increase FOTO score to at least 39/100 to demonstrate improvement in functional mobility. 2. Patient will report no more than  \"limited a little\" when asked on the FOTO questionnaire, \"How much difficulty do you have performing moderate activities like moving a table or pushing a vacuum ? \" to improve participation in household chores. 3. Patient will improve left hip extension AROM to at least to 10 degrees from neutral to improve ease of right step length for ambulation with decreased assistance. 4. Patient will be able perform Romberg stance on foam for at least 30 sec in order to navigate uneven surfaces with increased ease.  progressing gradually, min LOB 7-13-22       PLAN  [x]  Upgrade activities as tolerated     [x]  Continue plan of care  []  Update interventions per flow sheet       []  Discharge due to:_  []  Other:_      Yessy Dubois PTA 8/5/2022  9:32 AM    Future Appointments   Date Time Provider Katina Barrera   8/5/2022 12:45 PM Perry Purcell [facility]   8/8/2022  8:15 AM Gwen Bernard PTA [facility]   8/12/2022 11:15 AM Xiomara Mariee, PT [facility]

## 2022-08-05 NOTE — PROGRESS NOTES
[facility] - [facility] [employer] [facility] [employer]  [address] [place] [address]  [phone] fax    Continued Plan of Care/ Re-certification for Physical Therapy Services    Patient name: Madhavi Asencio Start of Care: 2022   Referral source: Saundra Capps MD : 1970   Medical/Treatment Diagnosis: Weakness of right lower extremity [R29.898]  Weakness of left lower extremity [R29.898]  Other abnormalities of gait and mobility [R26.89]  Payor: [facility] MEDICARE / Plan: [facility] / Product Type: Managed Care Medicare /  Onset Date:2007;   COVID-2022       Prior Hospitalization: see medical history Provider#: [id number]   Medications: Verified on Patient Summary List    Comorbidities:  HTN, arthritis, diabetes, heart disease, depresion, visual impairment  Prior Level of Function:ambulating with quad cane; lives in 1 story home with ramp with 3 steps to enter living area; lives with wife and daughter; has shower chair  Visits from Start of Care: 12   Missed Visits: 1    The Plan of Care and following information is based on the patient's current status:  Goal: Patient will increase FOTO score to at least 39/100 to demonstrate improvement in functional mobility. Status at last note/certification:   Current Status: not met    Goal: Patient will report no more than  \"limited a little\" when asked on the FOTO questionnaire, \"How much difficulty do you have performing moderate activities like moving a table or pushing a vacuum ? \" to improve participation in household chores. Status at last note/certification: \"limited a lot\"  Current Status: not met    Goal: Patient will improve left hip extension AROM to at least to 10 degrees from neutral to improve ease of right step length for ambulation with decreased assistance.   Status at last note/certification: 25 degrees from 0  Current Status: not met    Goal:Patient will be able perform Romberg stance on foam for at least 30 sec in order to navigate uneven surfaces with increased ease. Status at last note/certification: unable  Current Status: not met    Key functional changes: none    Problems/ barriers to goal attainment: pt compliance; left hip pain and hip flexion contracture    Problem List: pain affecting function, decrease ROM, decrease strength, edema affecting function, impaired gait/ balance, decrease ADL/ functional abilitiies, decrease activity tolerance, decrease flexibility/ joint mobility, and decrease transfer abilities    Treatment Plan: Therapeutic exercise, Therapeutic activities, Neuromuscular re-education, Physical agent/modality, Gait/balance training, Manual therapy, Patient education, Self Care training, Functional mobility training, Home safety training, and Stair training     Patient Goal (s) has been updated and includes: \"be able to walk better\"     Goals for this certification period to be accomplished in 4 weeks:  1. Patient will increase FOTO score to at least 39/100 to demonstrate improvement in functional mobility. Status at last note/certification: 89/304  2. Patient will report no more than  \"limited a little\" when asked on the FOTO questionnaire, \"How much difficulty do you have performing moderate activities like moving a table or pushing a vacuum ? \" to improve participation in household chores. Status at last note/certification: \"limited a lot\"  3. Patient will improve left hip extension AROM to at least to 10 degrees from neutral to improve ease of right step length for ambulation with decreased assistance. Status at last note/certification: 25 degrees from 0  4. Patient will be able perform Romberg stance on foam for at least 30 sec in order to navigate uneven surfaces with increased ease.    Status at last note/certification: unable     Frequency / Duration: Patient to be seen 2 times per week for 4 weeks:    Assessment / Recommendations: Mr. Maxwell Lopez is making slow progress towards goals in therapy due to limited attendance awaiting insurance authorization and noncompliance with HEP at home. He struggles with motivation, discipline and denial of functional deficits for progressing with strength, stretching and balance. He continues to have increased left hip pain in weightbearing making it difficult to advance right LE during gait. He has a 25 degree left hip flexion contracture causing a forward flexed posture in standing. He has no change in his FOTO score due to limited progression at home and he continues to have imbalance during balance interventions due to his forward center of gravity. Heavy emphasis and discussion was had with patient and family today in order to continue with PT as long as pt becomes more compliant with exercises and therapy recommendations; pt and family agreeable to pursue more compliance in order to continue. Skilled PT remains medically necessary to progress strength, mobility and balance for decreased fall risk with ambulation and transfers. Certification Period: 8/5/2022 to 9/3/2022    Soumya Lopez, PTA 8/5/2022 2:12 PM    ________________________________________________________________________  I certify that the above Therapy Services are being furnished while the patient is under my care. I agree with the treatment plan and certify that this therapy is necessary. [] I have read the above and request that my patient continue as recommended.   [] I have read the above report and request that my patient continue therapy with the following changes/special instructions: _______________________________________  [] I have read the above report and request that my patient be discharged from therapy    Physician's Signature:____________Date:_________TIME:________     Dorina Holland MD  ** Signature, Date and Time must be completed for valid certification **    Please sign and return to [facility] - [address] [facility]  [phone] fax

## 2022-08-08 ENCOUNTER — APPOINTMENT (OUTPATIENT)
Dept: PHYSICAL THERAPY | Age: 52
End: 2022-08-08
Payer: MEDICARE

## 2022-08-18 ENCOUNTER — OFFICE VISIT (OUTPATIENT)
Dept: ORTHOPEDIC SURGERY | Age: 52
End: 2022-08-18
Payer: MEDICARE

## 2022-08-18 VITALS — BODY MASS INDEX: 28.98 KG/M2 | WEIGHT: 207 LBS | TEMPERATURE: 98.6 F | HEIGHT: 71 IN

## 2022-08-18 DIAGNOSIS — M16.52 POST-TRAUMATIC OSTEOARTHRITIS OF LEFT HIP: ICD-10-CM

## 2022-08-18 DIAGNOSIS — M43.17 SPONDYLOLISTHESIS AT L5-S1 LEVEL: Primary | ICD-10-CM

## 2022-08-18 DIAGNOSIS — M21.371 FOOT DROP, RIGHT: ICD-10-CM

## 2022-08-18 DIAGNOSIS — M24.551 FLEXION CONTRACTURE OF RIGHT HIP: ICD-10-CM

## 2022-08-18 DIAGNOSIS — M70.61 TROCHANTERIC BURSITIS OF RIGHT HIP: ICD-10-CM

## 2022-08-18 DIAGNOSIS — M16.11 PRIMARY OSTEOARTHRITIS OF RIGHT HIP: ICD-10-CM

## 2022-08-18 DIAGNOSIS — M25.551 HIP PAIN, RIGHT: ICD-10-CM

## 2022-08-18 DIAGNOSIS — M24.552 FLEXION CONTRACTURE OF LEFT HIP: ICD-10-CM

## 2022-08-18 PROCEDURE — 3017F COLORECTAL CA SCREEN DOC REV: CPT | Performed by: SPECIALIST

## 2022-08-18 PROCEDURE — G8419 CALC BMI OUT NRM PARAM NOF/U: HCPCS | Performed by: SPECIALIST

## 2022-08-18 PROCEDURE — G8427 DOCREV CUR MEDS BY ELIG CLIN: HCPCS | Performed by: SPECIALIST

## 2022-08-18 PROCEDURE — 99203 OFFICE O/P NEW LOW 30 MIN: CPT | Performed by: SPECIALIST

## 2022-08-18 PROCEDURE — 73502 X-RAY EXAM HIP UNI 2-3 VIEWS: CPT | Performed by: SPECIALIST

## 2022-08-18 PROCEDURE — G8432 DEP SCR NOT DOC, RNG: HCPCS | Performed by: SPECIALIST

## 2022-08-18 PROCEDURE — 20610 DRAIN/INJ JOINT/BURSA W/O US: CPT | Performed by: SPECIALIST

## 2022-08-18 RX ORDER — BETAMETHASONE SODIUM PHOSPHATE AND BETAMETHASONE ACETATE 3; 3 MG/ML; MG/ML
3 INJECTION, SUSPENSION INTRA-ARTICULAR; INTRALESIONAL; INTRAMUSCULAR; SOFT TISSUE ONCE
Status: COMPLETED | OUTPATIENT
Start: 2022-08-18 | End: 2022-08-18

## 2022-08-18 RX ADMIN — BETAMETHASONE SODIUM PHOSPHATE AND BETAMETHASONE ACETATE 3 MG: 3; 3 INJECTION, SUSPENSION INTRA-ARTICULAR; INTRALESIONAL; INTRAMUSCULAR; SOFT TISSUE at 15:01
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
## 2022-08-19 ENCOUNTER — HOSPITAL ENCOUNTER (OUTPATIENT)
Dept: PHYSICAL THERAPY | Age: 52
Discharge: HOME | End: 2022-08-19
Payer: MEDICARE

## 2022-08-19 PROCEDURE — 97530 THERAPEUTIC ACTIVITIES: CPT

## 2022-08-19 NOTE — PROGRESS NOTES
PT DAILY TREATMENT NOTE     Patient Name: Fabi Daily  Date:2022  : 1970  [x]  Patient  Verified  Payor: [facility] MEDICARE / Plan: [facility] / Product Type: Managed Care Medicare /    In time:10:30A Out time:10:54A  Total Treatment Time (min): 24  Visit #: 1 of 8    Medicare/BCBS Only   Total Timed Codes (min):  24 1:1 Treatment Time: 24       Treatment Area: Weakness of right lower extremity [R29.898]  Weakness of left lower extremity [R29.898]  Other abnormalities of gait and mobility [R26.89]    SUBJECTIVE  Pain Level (0-10 scale): 0/10  Any medication changes, allergies to medications, adverse drug reactions, diagnosis change, or new procedure performed?: [x] No    [] Yes (see summary sheet for update)  Subjective functional status/changes:   [] No changes reported  Patient's spouse and aide report they went to the orthopedic specialist; they said there was arthritis B. The did not plan to do surgery or anything but did give him and injection. Aide reports patient's right leg is more swollen; aide has been checking for blood clots.     OBJECTIVE    24 min Therapeutic Activity:  []  See flow sheet : assessment/measurements, patient and caregiver education   Rationale: increase ROM, increase strength, improve coordination, improve balance, and increase proprioception  to improve the patients ability to perform ADLs with increased ease and safety            With   [] TE   [] TA   [] neuro   [] other: Patient Education: [x] Review HEP    [] Progressed/Changed HEP based on:   [] positioning   [] body mechanics   [] transfers   [] heat/ice application    [] other:      Other Objective/Functional Measures:     Advised on holding activities with therapy due to receiving injection  Low probability of DVT with Wells CPR  Girth 1.5 cm greater on right 10 cm below tibial tuberosity  No calf tightness  Educated on monitoring for signs/symptoms of DVT  Abrasion noted to right great toe-patient and caregivers educated on monitoring area  Issued size E and D tubing (E donned) to assist with swelling    Pain Level (0-10 scale) post treatment: 0/10    ASSESSMENT/Changes in Function: Held activities/exercises today as patient received injection to hip; advised to hold excessive activity for 24-48 hours following injections. He presents with min increased edema to right calf which aide reports as new but patient reports as ongoing. Limited likelihood of DVT using Wells CPR; patient and caregivers educated on monitoring symptoms along with performing skin checks and monitoring for discomfort/change in symptoms with addition of tubing to right LE. Patient will continue to benefit from skilled PT services to modify and progress therapeutic interventions, address functional mobility deficits, address ROM deficits, address strength deficits, analyze and address soft tissue restrictions, analyze and cue movement patterns, analyze and modify body mechanics/ergonomics, assess and modify postural abnormalities, address imbalance/dizziness, and instruct in home and community integration to attain remaining goals. Progress towards goals / Updated goals:  1. Patient will increase FOTO score to at least 39/100 to demonstrate improvement in functional mobility. Status at last note/certification: 59/352  2. Patient will report no more than  \"limited a little\" when asked on the FOTO questionnaire, \"How much difficulty do you have performing moderate activities like moving a table or pushing a vacuum ? \" to improve participation in household chores. Status at last note/certification: \"limited a lot\"  3. Patient will improve left hip extension AROM to at least to 10 degrees from neutral to improve ease of right step length for ambulation with decreased assistance. Status at last note/certification: 25 degrees from 0  4.  Patient will be able perform Romberg stance on foam for at least 30 sec in order to navigate uneven surfaces with increased ease.    Status at last note/certification: unable       PLAN  [x]  Upgrade activities as tolerated     [x]  Continue plan of care  []  Update interventions per flow sheet       []  Discharge due to:_  []  Other:_      Elie Arteaga, PT 8/19/2022  8:42 AM    Future Appointments   Date Time Provider Katina Barrera   8/19/2022 10:30 AM Xiomara Cormier, PT [facility]

## 2022-08-29 ENCOUNTER — HOSPITAL ENCOUNTER (OUTPATIENT)
Dept: PHYSICAL THERAPY | Age: 52
Discharge: HOME | End: 2022-08-29
Payer: MEDICARE

## 2022-08-29 PROCEDURE — 97530 THERAPEUTIC ACTIVITIES: CPT

## 2022-08-29 PROCEDURE — 97110 THERAPEUTIC EXERCISES: CPT

## 2022-08-29 NOTE — PROGRESS NOTES
PT DAILY TREATMENT NOTE     Patient Name: Robbie Rocha  Date:2022  : 1970  [x]  Patient  Verified  Payor: [facility] MEDICARE / [facility] / Product Type: Managed Care Medicare /    In time:1209  Out time:1258  Total Treatment Time (min): 49  Visit #: 2 of 8    Medicare/BCBS Only   Total Timed Codes (min):  49 1:1 Treatment Time:  49       Treatment Area: Weakness of right lower extremity [R29.898]  Weakness of left lower extremity [R29.898]  Other abnormalities of gait and mobility [R26.89]    SUBJECTIVE  Pain Level (0-10 scale): 0  Any medication changes, allergies to medications, adverse drug reactions, diagnosis change, or new procedure performed?: [x] No    [] Yes (see summary sheet for update)  Subjective functional status/changes:   [] No changes reported  Patient's spouse and aide both report that patient has been exhibiting decreased motivation when outside of the clinic, declining to complete HEP or move from sitting on couch. Aide does report that the LE swelling has gone down  and patient denies muscular pain, only reporting that the he does not like the orthotic. OBJECTIVE    19 min Therapeutic Exercise:  [x] See flow sheet :   Rationale: increase ROM and increase strength to improve the patients ability to complete ADLs with ease. 30 min Therapeutic Activity:  [x]  See flow sheet : functional squatting, marching, and standing   Rationale: increase ROM, increase strength, improve coordination, and increase proprioception  to improve the patients ability to complete functional activities with ease.               With   [x] TE   [] TA   [] neuro   [] other: Patient Education: [x] Review HEP    [] Progressed/Changed HEP based on:   [] positioning   [] body mechanics   [] transfers   [] heat/ice application    [x] other: Patient educated on need for increased movement throughout the day as well as lying supine with      Other Objective/Functional Measures: -BP: 133/89 mmHg  -HR: 70 bpm at start of session    -5 sit to stands completed with mod assist x2 and frequent rest breaks required. Gait belt used. Max VC/TC required to increase hip extension and decrease Posterior trunk leaning compensation. -PNF stretching of hip flexors and hip extensors initiated, with slight improvement in mobility observed afterward.      -supine lying on plinth with weight on knee for OP facilitated, with good increase in knee extension during hip extension noted afterward.     -left hip extension AROM remains at lacking 25 degrees. Pain Level (0-10 scale) post treatment: 0    ASSESSMENT/Changes in Function: Today's session focused on increased hip, glute, and quad strength and mobility for improved kinesthetic awareness. Supine lying on plinth with weight on knee and PNF stretching were facilitated for improved length-tension relationship of hip flexors and extensors, with noticeable improvement observed afterward, indicating a positive response. Patient's family continues to report limited motivation to complete his HEP or negotiate within his home, more than likely contributing to his forward flexed posture. Patient will continue to benefit from skilled PT services to modify and progress therapeutic interventions, address functional mobility deficits, address ROM deficits, address strength deficits, analyze and address soft tissue restrictions, analyze and cue movement patterns, analyze and modify body mechanics/ergonomics, assess and modify postural abnormalities, address imbalance/dizziness, and instruct in home and community integration to attain remaining goals. []  See Plan of Care  []  See progress note/recertification  []  See Discharge Summary         Progress towards goals / Updated goals:  1. Patient will increase FOTO score to at least 39/100 to demonstrate improvement in functional mobility. Status at last note/certification: 05/207  2.  Patient will report no more than  \"limited a little\" when asked on the FOTO questionnaire, \"How much difficulty do you have performing moderate activities like moving a table or pushing a vacuum ? \" to improve participation in household chores. Status at last note/certification: \"limited a lot\"  3. Patient will improve left hip extension AROM to at least to 10 degrees from neutral to improve ease of right step length for ambulation with decreased assistance. Status at last note/certification: 25 degrees from 0  Current: left hip extension AROM remains at lacking 25 degrees. 8/29/22  4. Patient will be able perform Romberg stance on foam for at least 30 sec in order to navigate uneven surfaces with increased ease.    Status at last note/certification: unable    PLAN  []  Upgrade activities as tolerated     []  Continue plan of care  []  Update interventions per flow sheet       []  Discharge due to:_  []  Other:_      Vadim García PTA 8/29/2022  8:43 AM    Future Appointments   Date Time Provider Katina Barrera   8/29/2022 12:00 PM Suhail Zapata PTA [facility]

## 2022-09-06 ENCOUNTER — HOSPITAL ENCOUNTER (OUTPATIENT)
Dept: PHYSICAL THERAPY | Age: 52
Discharge: HOME | End: 2022-09-06
Payer: MEDICARE

## 2022-09-06 PROCEDURE — 97530 THERAPEUTIC ACTIVITIES: CPT

## 2022-09-06 NOTE — PROGRESS NOTES
PT DAILY TREATMENT NOTE     Patient Name: Nick Lee  Date:2022  : 1970  [x]  Patient  Verified  Payor: [facility] MEDICARE / Plan: ALEXIS SHEPHERD [facility] / Product Type: Managed Care Medicare /    In time: 12:11  Out time: 12:50  Total Treatment Time (min)39  Visit #: 1 of 8    Medicare/BCBS Only   Total Timed Codes (min):  39 1:1 Treatment Time: 39        Treatment Area: Weakness of right lower extremity [R29.898]  Weakness of left lower extremity [R29.898]  Other abnormalities of gait and mobility [R26.89]    SUBJECTIVE  Pain Level (0-10 scale): 0/10  Any medication changes, allergies to medications, adverse drug reactions, diagnosis change, or new procedure performed?: [x] No    [] Yes (see summary sheet for update)  Subjective functional status/changes:   [] No changes reported  Pt states no current pain. Family member reports near fall today where the couch was able to assist. They were told in the past and the pt did better with a rollator but hasn't wanted to use it. They also report decreased motivation to leave the chair with the tv is turned on for bathroom breaks. OBJECTIVE    39 min Therapeutic Activity:  [x]  See flow sheet : FOTO; goal reassessment   Rationale: increase ROM, increase strength, improve coordination, and increase proprioception  to improve the patients ability to complete functional activities with ease. With   [x] TE   [] TA   [] neuro   [] other: Patient Education: [x] Review HEP    [] Progressed/Changed HEP based on:   [] positioning   [] body mechanics   [] transfers   [] heat/ice application    [] other:      Other Objective/Functional Measures: FOTO:49/100  Romberg stance on foam: not assessed  Discussed functional training moving forward  Pt to bring in rollator    Pain Level (0-10 scale) post treatment: 0/10    ASSESSMENT/Changes in Function: See Recertification.     Patient will continue to benefit from skilled PT services to modify and progress therapeutic interventions, address functional mobility deficits, address ROM deficits, address strength deficits, analyze and address soft tissue restrictions, analyze and cue movement patterns, analyze and modify body mechanics/ergonomics, assess and modify postural abnormalities, address imbalance/dizziness, and instruct in home and community integration to attain remaining goals. [x]  See Plan of Care  [x]  See progress note/recertification  [x]  See Discharge Summary         Progress towards goals / Updated goals:  1. Patient will increase FOTO score to at least 39/100 to demonstrate improvement in functional mobility. Status at last note/certification: 79/176 Met 49/100  2. Patient will report no more than  \"limited a little\" when asked on the FOTO questionnaire, \"How much difficulty do you have performing moderate activities like moving a table or pushing a vacuum ? \" to improve participation in household chores. Status at last note/certification: \"limited a lot\" Met  3. Patient will improve left hip extension AROM to at least to 10 degrees from neutral to improve ease of right step length for ambulation with decreased assistance. Status at last note/certification: 25 degrees from 0  Current: left hip extension AROM remains at lacking 25 degrees. 8/29/22  4. Patient will be able perform Romberg stance on foam for at least 30 sec in order to navigate uneven surfaces with increased ease.    Status at last note/certification: unable Not met    PLAN  [x]  Upgrade activities as tolerated     [x]  Continue plan of care  []  Update interventions per flow sheet       []  Discharge due to:_  []  Other:_      Caldwell Buerger, PTA 9/6/2022  8:43 AM    Future Appointments   Date Time Provider Katina Barrera   9/6/2022 12:00 PM Homa Lamas PTA [facility]   9/8/2022 12:00 PM Cait Benavides PTA [facility]

## 2022-09-06 NOTE — PROGRESS NOTES
[facility] [employer] [facility]  [place]  [address] [place] [address]  [phone] fax    Continued Plan of Care/ Re-certification for Physical Therapy Services    Patient name: Charley Kennedy Start of Care: 2022   Referral source: Long Mahan MD : 1970   Medical/Treatment Diagnosis: Weakness of right lower extremity [R29.898]  Weakness of left lower extremity [R29.898]  Other abnormalities of gait and mobility [R26.89]  Payor: [facility] MEDICARE / Plan: [facility] / Product Type: Managed Care Medicare /  Onset Date:CVA ;   COVID-2022      Prior Hospitalization: see medical history Provider#: [id number]   Medications: Verified on Patient Summary List    Comorbidities:  HTN, arthritis, diabetes, heart disease, depresion, visual impairment  Prior Level of Function:ambulating with quad cane; lives in 1 story home with ramp with 3 steps to enter living area; lives with wife and daughter; has shower chair  Visits from Start of Care: 14   Missed Visits: 2    The Plan of Care and following information is based on the patient's current status:  Goal: Patient will increase FOTO score to at least 39/100 to demonstrate improvement in functional mobility. Status at last note/certification: 53/285  Current Status: met    Goal: Patient will report no more than  \"limited a little\" when asked on the FOTO questionnaire, \"How much difficulty do you have performing moderate activities like moving a table or pushing a vacuum ? \" to improve participation in household chores. Status at last note/certification: \"limited a lot\"  Current Status: met    Goal: Patient will improve left hip extension AROM to at least to 10 degrees from neutral to improve ease of right step length for ambulation with decreased assistance.   Status at last note/certification: 25 degrees from 0  Current Status: not met    Goal: Patient will be able perform Romberg stance on foam for at least 30 sec in order to navigate uneven surfaces with increased ease. 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; sedentary lifestyle at home with lack of motivation to improve    Problem List: pain affecting function, decrease ROM, decrease strength, edema affecting function, impaired gait/ balance, decrease ADL/ functional abilitiies, decrease activity tolerance, decrease flexibility/ joint mobility, and decrease transfer abilities    Treatment Plan: Therapeutic exercise, Therapeutic activities, Neuromuscular re-education, Physical agent/modality, Gait/balance training, Manual therapy, Patient education, Self Care training, Functional mobility training, Home safety training, and Stair training     Patient Goal (s) has been updated and includes: \"walk better and turn better\"     Goals for this certification period to be accomplished in 4 weeks:  1. Patient will ambulate at least 50 feet with rollator with no more than SBA and no evidence of imbalance to ensure safety with household navigation using this as his primary assistive device. 2. Patient will be independent with a final comprehensive program for home to improve mobility and strength for improved ease of ambulation and decreased fall risk upon D/C from therapy. Frequency / Duration: Patient to be seen 2 times per week for 4 weeks:    Assessment / Recommendations: Mr. Trent Luna is making slow progress towards goals due to ongoing limitations of hip AROM and strength. He is unstable on his current [facility] causing increased right leg drag and difficulty with transfers. The family mentioned a rollator that may be a better fit for the patient as he progressed on that AD better before.  His left hip AROM extension remains limited due to flexion contracture and his balance on compliant surfaces remains limited. Some of his lack of progress is due to sedentary lifestyle but also suspect behavioral health component as pt no longer has friends and does not leave the house except for doctor's appointments. Advised family to discuss depression symptoms with PCP for advice and recommendations. Skilled PT is medically necessary to progress gait with RW versus rollator to improve safety with ambulation in the home and the community for improved QOL and decreased fall risk. Certification Period: 09/06/2022 to 10/05/2022    Harish Cariastwila, PTA 9/6/2022 8:57 AM    ________________________________________________________________________  I certify that the above Therapy Services are being furnished while the patient is under my care. I agree with the treatment plan and certify that this therapy is necessary. [] I have read the above and request that my patient continue as recommended.   [] I have read the above report and request that my patient continue therapy with the following changes/special instructions: _______________________________________  [] I have read the above report and request that my patient be discharged from therapy    Physician's Signature:____________Date:_________TIME:________     Julia Silva MD  ** Signature, Date and Time must be completed for valid certification **    Please sign and return to [facility]  [address] [place] [address]  [phone] fax

## 2022-09-08 ENCOUNTER — APPOINTMENT (OUTPATIENT)
Dept: PHYSICAL THERAPY | Age: 52
End: 2022-09-08
Payer: MEDICARE

## 2022-09-12 ENCOUNTER — HOSPITAL ENCOUNTER (OUTPATIENT)
Dept: PHYSICAL THERAPY | Age: 52
End: 2022-09-12
Payer: MEDICARE

## 2022-09-16 ENCOUNTER — HOSPITAL ENCOUNTER (OUTPATIENT)
Dept: PHYSICAL THERAPY | Age: 52
Discharge: HOME | End: 2022-09-16
Payer: MEDICARE

## 2022-09-16 PROCEDURE — 97116 GAIT TRAINING THERAPY: CPT

## 2022-09-16 PROCEDURE — 97530 THERAPEUTIC ACTIVITIES: CPT

## 2022-09-16 NOTE — PROGRESS NOTES
PT DAILY TREATMENT NOTE     Patient Name: Amanda Davis  Date:2022  : 1970  [x]  Patient  Verified  Payor: [facility] MEDICARE / Plan: [facility]LEX[facility] SHEPHERD [facility] / Product Type: Managed Care Medicare /    In time: 2:15  Out time: 3:00  Total Treatment Time (min): 45  Visit #: 2 of 8    Medicare/BCBS Only   Total Timed Codes (min): 45  1:1 Treatment Time: 45       Treatment Area: Weakness of right lower extremity [R29.898]  Weakness of left lower extremity [R29.898]  Other abnormalities of gait and mobility [R26.89]    SUBJECTIVE  Pain Level (0-10 scale): 0/10  Any medication changes, allergies to medications, adverse drug reactions, diagnosis change, or new procedure performed?: [x] No    [] Yes (see summary sheet for update)  Subjective functional status/changes:   [] No changes reported  Family reports 3 falls recently with the pt trying to walk without his cane and without help. Nurse had to help the pt off the floor this morning. They state increased ankle swelling and a wound on the big toe. They have a follow up with the MD on Monday. Pt doesn't want to get moving at home and wants to stay in his chair. OBJECTIVE    30 min Therapeutic Activity:  [x]  See flow sheet : family education; wound care education; MD follow up education    Rationale: increase ROM, increase strength, improve coordination, and increase proprioception  to improve the patients ability to complete functional activities with ease. 15 min Gait Training:  [x]  See flow sheet : 27' with rollator; 20' x 3 x 3 times with [employer] [place]   Rationale: increase ROM, increase strength, improve coordination, and increase proprioception  to improve the patients ability to complete functional activities with ease.               With   [x] TE   [] TA   [] neuro   [] other: Patient Education: [x] Review HEP    [] Progressed/Changed HEP based on:   [] positioning   [] body mechanics   [] transfers   [] heat/ice application    [] other: Other Objective/Functional Measures:   Decreased safety with rollator with increased left shift due to UE weightbearing in rollator with right hand strapped in; pt also felt he wouldn't use a rollator if offered  Reversed back to using [employer] with verbal and tactile cues of sequencing and pt repeat for reinforcement  Pencil eraser size wound on dorsal aspect of right great toe that is open without scabbing present; educated to inform MD of this and keep dry until MD follow up to reduce ulcer  Educated on elevating ankle and ice for swelling and informing of MD to rule out sprain due to pt high pain tolerance    Pain Level (0-10 scale) post treatment: 0/10    ASSESSMENT/Changes in Function: Pt progressing with sequencing of using SBQC to decrease fall risk at home. He has poor safety awareness as he continues to attempt ambulation without AD or familial help and has increased falls recently. He has a pencil eraser size wound on the dorsal aspect of the great toe on the right and is following up with the MD on Monday. Will await insurance authorization to decide continuation versus D/C for therapy in regards to working on functional mobility and safety at home. Patient will continue to benefit from skilled PT services to modify and progress therapeutic interventions, address functional mobility deficits, address ROM deficits, address strength deficits, analyze and address soft tissue restrictions, analyze and cue movement patterns, analyze and modify body mechanics/ergonomics, assess and modify postural abnormalities, address imbalance/dizziness, and instruct in home and community integration to attain remaining goals. [x]  See Plan of Care  [x]  See progress note/recertification  [x]  See Discharge Summary         Goals for this certification period to be accomplished in 4 weeks:  1.  Patient will ambulate at least 50 feet with rollator with no more than SBA and no evidence of imbalance to ensure safety with household navigation using this as his primary assistive device. 2. Patient will be independent with a final comprehensive program for home to improve mobility and strength for improved ease of ambulation and decreased fall risk upon D/C from therapy.        PLAN  [x]  Upgrade activities as tolerated     [x]  Continue plan of care  []  Update interventions per flow sheet       []  Discharge due to:_  []  Other:_      Soumya oLpez PTA 9/16/2022  8:43 AM    Future Appointments   Date Time Provider Katina Barrera   9/16/2022  2:15 PM Lita Watkins PTA [facility]

## 2022-09-23 ENCOUNTER — HOSPITAL ENCOUNTER (OUTPATIENT)
Dept: PHYSICAL THERAPY | Age: 52
Discharge: HOME | End: 2022-09-23
Payer: MEDICARE

## 2022-09-23 PROCEDURE — 97112 NEUROMUSCULAR REEDUCATION: CPT

## 2022-09-23 PROCEDURE — 97530 THERAPEUTIC ACTIVITIES: CPT

## 2022-09-23 PROCEDURE — 97116 GAIT TRAINING THERAPY: CPT

## 2022-09-23 NOTE — PROGRESS NOTES
PT DAILY TREATMENT NOTE     Patient Name: Joelle Hoffman  Date:2022  : 1970  [x]  Patient  Verified  Payor: [facility] MEDICARE / Plan: ALEX[facility] SHEPHERD [facility] / Product Type: Managed Care Medicare /    In time:12:17P  Out time:12:55P  Total Treatment Time (min): 48  Visit #: 3 of 8    Medicare/BCBS Only   Total Timed Codes (min):  48 1:1 Treatment Time:  48       Treatment Area: Weakness of right lower extremity [R29.898]  Weakness of left lower extremity [R29.898]  Other abnormalities of gait and mobility [R26.89]    SUBJECTIVE  Pain Level (0-10 scale): 8/10 left hip, 6/10 toe   Any medication changes, allergies to medications, adverse drug reactions, diagnosis change, or new procedure performed?: [x] No    [] Yes (see summary sheet for update)  Subjective functional status/changes:   [] No changes reported  Patient and spouse/nurse report no falls this week. The MD said the toe looks better but to keep watch on int.      OBJECTIVE    8 min Therapeutic Activity:  [x]  See flow sheet : car transfer   Rationale: increase ROM, increase strength, improve coordination, improve balance, and increase proprioception  to improve the patients ability to perform daily tasks with increased ease     19 min Neuromuscular Re-education:  [x]  See flow sheet : ambulation with activiites for upright posture and scanning   Rationale: increase ROM, increase strength, improve coordination, improve balance, and increase proprioception  to improve the patients ability to perform ADLs with increased ease      13 min Gait Trainin feet with SQBC device on level surfaces with standby level of assist   Rationale:improve sequencing and increase ease/stability with gait            With   [] TE   [] TA   [] neuro   [] other: Patient Education: [x] Review HEP    [] Progressed/Changed HEP based on:   [] positioning   [] body mechanics   [] transfers   [] heat/ice application    [] other:      Other Objective/Functional Measures:       Gait: 5 min, 20 ft with SBQC with WC follow   Verbal and visual cues for sequencing along with having patient place cane and ambulate to level of therapist's foot to encourage right step length and decrease left step length  Gait 44 ft with SBQC and WC follow-patient with decreased right foot clearance secondary to reports of fatigue; patient with difficulty with dual tasking with locating objects in clinic while playing \"I spy\" and would stop ambulating to locate objects; instruction to \"find spot on wall and keep looking up at it\" while ambulating for safety with scanning and upright posture  Tactile and verbal cues for eccentric control with sitting   Increased time to complete car transfer with cues to perform scooting with hand placement on bar of roof to assist with positioning; min A to manage right LE into vehicle with cues to bring left LE farther into seat to allow space for right foot       Pain Level (0-10 scale) post treatment: 0/10    ASSESSMENT/Changes in Function: Patient demonstrates improved sequencing of gait with verbal and visual cues for cane and foot placement. He continues to demonstrate decreased safety awareness with transfers and will stand with decreased anterior weight shift and sits with poor eccentric control improved with verbal cues. He continues to demonstrate impaired standing posture (with hip flex tightness/contracture as contributing factors) and posterior kinetic chain weakness contributing to back pain with prolonged standing/ambulation with cues for upright posture.     Patient will continue to benefit from skilled PT services to modify and progress therapeutic interventions, address functional mobility deficits, address ROM deficits, address strength deficits, analyze and address soft tissue restrictions, analyze and cue movement patterns, analyze and modify body mechanics/ergonomics, assess and modify postural abnormalities, address imbalance/dizziness, and instruct in home and community integration to attain remaining goals. Progress towards goals / Updated goals:  1. Patient will ambulate at least 50 feet with rollator with no more than SBA and no evidence of imbalance to ensure safety with household navigation using this as his primary assistive device. 2. Patient will be independent with a final comprehensive program for home to improve mobility and strength for improved ease of ambulation and decreased fall risk upon D/C from therapy.     PLAN  []  Upgrade activities as tolerated     [x]  Continue plan of care  []  Update interventions per flow sheet       []  Discharge due to:_  []  Other:_      Giorgioland Smoke, PT 9/23/2022  8:51 AM    Future Appointments   Date Time Provider Katina Barrera   9/23/2022 12:00 PM Hudgins Frankey Proctor, [place] [facility]   9/28/2022  1:30 PM Mundo Brandon PTA [facility]   9/30/2022 12:45 PM Guero Ritchie PTA [facility]   10/5/2022 12:45 PM Hudgins Frankey Proctor, PT [facility]

## 2022-09-28 ENCOUNTER — HOSPITAL ENCOUNTER (OUTPATIENT)
Dept: PHYSICAL THERAPY | Age: 52
Discharge: HOME | End: 2022-09-28
Payer: MEDICARE

## 2022-09-28 PROCEDURE — 97116 GAIT TRAINING THERAPY: CPT

## 2022-09-28 PROCEDURE — 97110 THERAPEUTIC EXERCISES: CPT

## 2022-09-28 NOTE — PROGRESS NOTES
PT DAILY TREATMENT NOTE     Patient Name: Lia Hussein  Date:2022  : 1970  [x]  Patient  Verified  Payor: [facility] MEDICARE [facility] Product Type: Managed Care Medicare /    In time: 1:35 Out time: 2:20  Total Treatment Time (min): 45  Visit #: 4 of 8    Medicare/BCBS Only   Total Timed Codes (min): 45  1:1 Treatment Time:  45       Treatment Area: Weakness of right lower extremity [R29.898]  Weakness of left lower extremity [R29.898]  Other abnormalities of gait and mobility [R26.89]    SUBJECTIVE  Pain Level (0-10 scale): 0/10  Any medication changes, allergies to medications, adverse drug reactions, diagnosis change, or new procedure performed?: [x] No    [] Yes (see summary sheet for update)  Subjective functional status/changes:   [] No changes reported  Pt reports no current pain. Wife states they are without a nurse this week. She reports he doesn't want to get up at home and will urinate and defecate on himself. Pt states he needs to get up and walk more. Per wife, pt holds on to furniture to walk instead of using SBQC.     OBJECTIVE    20 min Therapeutic Exercise:  [x]  See flow sheet : car transfer  bike at end   Rationale: increase ROM, increase strength, improve coordination, improve balance, and increase proprioception  to improve the patients ability to perform daily tasks with increased ease       25 min Gait Trainin feet and 35 feet feet with SQBC device on level surfaces with standby level of assist   Rationale:improve sequencing and increase ease/stability with gait            With   [] TE   [] TA   [] neuro   [] other: Patient Education: [x] Review HEP    [] Progressed/Changed HEP based on:   [] positioning   [] body mechanics   [] transfers   [] heat/ice application    [] other:      Other Objective/Functional Measures:   Pt reports aching left hip pain with ambulation but states pain isn't what causes him to want to sit down  Increased respiration with walking  Trial of bike at end; asked wife about getting out bike at home to see if it would be usable by patient  Assisted with car transfer at end with just verbal cues for safety    Pain Level (0-10 scale) post treatment: 0/10    ASSESSMENT/Changes in Function: Pt progressing with sequencing of SBQC but continues to remain sedentary at home and when up, declines using SBQC and holds to wall and furniture instead. Will continue to progress gait training and encourage more up out of the chair at home as able. Patient will continue to benefit from skilled PT services to modify and progress therapeutic interventions, address functional mobility deficits, address ROM deficits, address strength deficits, analyze and address soft tissue restrictions, analyze and cue movement patterns, analyze and modify body mechanics/ergonomics, assess and modify postural abnormalities, address imbalance/dizziness, and instruct in home and community integration to attain remaining goals. Progress towards goals / Updated goals:  1. Patient will ambulate at least 50 feet with rollator with no more than SBA and no evidence of imbalance to ensure safety with household navigation using this as his primary assistive device. Deemed safer with [employer] and working on sequencing 36/35 feet on two trials with sitting break in between (9/28/22)  2. Patient will be independent with a final comprehensive program for home to improve mobility and strength for improved ease of ambulation and decreased fall risk upon D/C from therapy.     PLAN  [x]  Upgrade activities as tolerated     [x]  Continue plan of care  []  Update interventions per flow sheet       []  Discharge due to:_  []  Other:_      Stacia Aase, PTA 9/28/2022  8:51 AM    Future Appointments   Date Time Provider Katina Barrera   9/28/2022  1:30 PM Anne Velasco [facility]   9/30/2022 12:45 PM Thien Padgett PTA [facility]   10/5/2022 12:45 PM Xiomara Cross Bud Clemens

## 2022-09-30 ENCOUNTER — HOSPITAL ENCOUNTER (OUTPATIENT)
Dept: PHYSICAL THERAPY | Age: 52
Discharge: HOME | End: 2022-09-30
Payer: MEDICARE

## 2022-09-30 PROCEDURE — 97116 GAIT TRAINING THERAPY: CPT

## 2022-09-30 PROCEDURE — 97110 THERAPEUTIC EXERCISES: CPT

## 2022-09-30 PROCEDURE — 97112 NEUROMUSCULAR REEDUCATION: CPT

## 2022-09-30 NOTE — PROGRESS NOTES
PT DAILY TREATMENT NOTE     Patient Name: Prabha Veras  Date:2022  : 1970  [x]  Patient  Verified  Payor: [facility] MEDICARE / [facility] Product Type: Managed Care Medicare /    In time: 12:55  Out time:1:43  Total Treatment Time (min): 48  Visit #: 5 of 8    Medicare/BCBS Only   Total Timed Codes (min):  48 1:1 Treatment Time:  38       Treatment Area: Weakness of right lower extremity [R29.898]  Weakness of left lower extremity [R29.898]  Other abnormalities of gait and mobility [R26.89]    SUBJECTIVE  Pain Level (0-10 scale): 0/10  Any medication changes, allergies to medications, adverse drug reactions, diagnosis change, or new procedure performed?: [x] No    [] Yes (see summary sheet for update)  Subjective functional status/changes:   [] No changes reported  Pt reports doing ok.     OBJECTIVE    23 min Therapeutic Exercise:  [x] See flow sheet :   Rationale: increase ROM and increase strength to improve the patients ability to amb & perform ADLs with more ease     10 min Neuromuscular Re-education:  []  See flow sheet :   Rationale: improve coordination, improve balance, and increase proprioception  to improve the patients ability to amb & perform ADLs with ease & safety      15 min Gait Training:  __150_ feet with _SBQC__ device on level surfaces with __CGA <-> SBA_ level of assist   Rationale: improve pt's ease/tolerance with transfer and household amb            With   [] TE   [] TA   [] neuro   [] other: Patient Education: [x] Review HEP    [] Progressed/Changed HEP based on:   [] positioning   [] body mechanics   [] transfers   [] heat/ice application    [] other:      Other Objective/Functional Measures:    Challenged with heel contact    Mod fatigued with all exercises   Mod challenged with stepping over small cane    Difficulty with stand <-> sit, min A occasionally    Pain Level (0-10 scale) post treatment: 0/10    ASSESSMENT/Changes in Function: pt's making gradual progression with PT. Gait pattern cont to be limited with decreased ankle rocker, decreased step length, and flexed knee. Pt stayed highly motivated. Will cont to progress as tolerated. Patient will continue to benefit from skilled PT services to modify and progress therapeutic interventions, address functional mobility deficits, address ROM deficits, address strength deficits, analyze and address soft tissue restrictions, analyze and cue movement patterns, analyze and modify body mechanics/ergonomics, assess and modify postural abnormalities, address imbalance/dizziness, and instruct in home and community integration to attain remaining goals. []  See Plan of Care  [x]  See progress note/recertification  []  See Discharge Summary         Progress towards goals / Updated goals:  1. Patient will ambulate at least 50 feet with rollator with no more than SBA and no evidence of imbalance to ensure safety with household navigation using this as his primary assistive device. Deemed safer with [employer] [place] and working on sequencing 36/35 feet on two trials with sitting break in between (9/28/22)  2. Patient will be independent with a final comprehensive program for home to improve mobility and strength for improved ease of ambulation and decreased fall risk upon D/C from therapy.     PLAN  [x]  Upgrade activities as tolerated     [x]  Continue plan of care  []  Update interventions per flow sheet       []  Discharge due to:_  []  Other:_      Alirio Cunha 9/30/2022  8:10 AM    Future Appointments   Date Time Provider Katina Barrera   9/30/2022 12:45 PM Laura WONG [facility]   10/5/2022 12:45 PM Xi[facility]ara Batista, [facility]

## 2022-10-05 ENCOUNTER — HOSPITAL ENCOUNTER (OUTPATIENT)
Dept: PHYSICAL THERAPY | Age: 52
Discharge: HOME | End: 2022-10-05
Payer: MEDICARE

## 2022-10-05 PROCEDURE — 97530 THERAPEUTIC ACTIVITIES: CPT

## 2022-10-05 NOTE — PROGRESS NOTES
PT DAILY TREATMENT NOTE     Patient Name: Trent Snyder  Date:10/5/2022  : 1970  [x]  Patient  Verified  Payor: [facility] MEDICARE / [facility] / Product Type: Managed Care Medicare /    In time:12:53P Out time:1:41P  Total Treatment Time (min): 48  Visit #: 6 of 8    Medicare/BCBS Only   Total Timed Codes (min):  48 1:1 Treatment Time:  48       Treatment Area: Weakness of right lower extremity [R29.898]  Weakness of left lower extremity [R29.898]  Other abnormalities of gait and mobility [R26.89]    SUBJECTIVE  Pain Level (0-10 scale): 0/10  Any medication changes, allergies to medications, adverse drug reactions, diagnosis change, or new procedure performed?: [x] No    [] Yes (see summary sheet for update)  Subjective functional status/changes:   [] No changes reported  Patient denies pain today. He does not feel like there are any things he needs to work on. His spouse has concerns for him holding onto walls/furniture when he stands to walk; patient reports he does not feel off balance when this happens. He likes football, movies, and dancing.      OBJECTIVE    48 min Therapeutic Activity:  []  See flow sheet : vitals assessment, patient and caregiver education   Rationale: increase ROM, increase strength, improve coordination, improve balance, and increase proprioception  to improve the patients ability to perform ADLs with increased ease             With   [] TE   [x] TA   [] neuro   [] other: Patient Education: [x] Review HEP    [] Progressed/Changed HEP based on:   [] positioning   [] body mechanics   [] transfers   [] heat/ice application    [x] other: adaptive sports/activities and that people may have various levels of ability; performing activities enjoyed by patient even if performance is not the same as it used to be; verifying if referral to behavioral health professional needed by insurance to assist with depression/decreased motivation for activity     Other Objective/Functional Measures:     Patietn attempting to hold onto plinth vs cane to transfer from [facility] at end of plinth to middle seat portion of plinth  Reviewed alternating sidestepping in place to simulate \"2 step\" dance move  Hanging items on wall to prevent use of UEs on wall for support        Pain Level (0-10 scale) post treatment: 0/10    ASSESSMENT/Changes in Function: See Discharge Summary    Patient will continue to benefit from skilled PT services to modify and progress therapeutic interventions, address functional mobility deficits, address ROM deficits, address strength deficits, analyze and address soft tissue restrictions, analyze and cue movement patterns, analyze and modify body mechanics/ergonomics, assess and modify postural abnormalities, address imbalance/dizziness, and instruct in home and community integration to attain remaining goals. Progress towards goals / Updated goals:  1. Patient will ambulate at least 50 feet with rollator with no more than SBA and no evidence of imbalance to ensure safety with household navigation using this as his primary assistive device. Deemed safer with [employer] and working on sequencing 36/35 feet on two trials with sitting break in between (9/28/22)  2. Patient will be independent with a final comprehensive program for home to improve mobility and strength for improved ease of ambulation and decreased fall risk upon D/C from therapy.        PLAN  []  Upgrade activities as tolerated     []  Continue plan of care  []  Update interventions per flow sheet       [x]  Discharge due to: patient making limited additional progress towards goals  []  Other:_      [place], PT 10/5/2022  8:46 AM    Future Appointments   Date Time Provider Katina Barrera   10/5/2022 12:45 PM [facility] Banerjee, [facility]

## 2022-10-17 NOTE — THERAPY DISCHARGE
[facility] [place] [employer]  [place]  [address] [facility]  [phone] fax    Physical Therapy Discharge Summary    Patient name: Nery Cadena Start of Care: 2022   Referral source: Pushpa Ford MD : 1970   Medical/Treatment Diagnosis: Weakness of right lower extremity [R29.898]  Weakness of left lower extremity [R29.898]  Other abnormalities of gait and mobility [R26.89]  Payor: [facility] MEDICARE / [facility] Product Type: Managed Care Medicare /  Onset Date:CVA ;   COVID-2022      Prior Hospitalization: see medical history Provider#: [id number]   Medications: Verified on Patient Summary List    Comorbidities: HTN, arthritis, diabetes, heart disease, depresion, visual impairment  Prior Level of Function:ambulating with quad cane; lives in 1 story home with ramp with 3 steps to enter living area; lives with wife and daughter; has shower chair    Visits from Start of Care: 20    Missed Visits: 2    Reporting Period : 22 to 10/5/22    Summary of Care:  Goal: Patient will ambulate at least 50 feet with rollator with no more than SBA and no evidence of imbalance to ensure safety with household navigation using this as his primary assistive device. Status at last note/certification: Progressing  Status at discharge: not met-up to 44 feet with [employer] [place], patient declined ambulation with rollator    Goal: Patient will be independent with a final comprehensive program for home to improve mobility and strength for improved ease of ambulation and decreased fall risk upon D/C from therapy. Status at last note/certification:Progressing  Status at discharge: not met        ASSESSMENT/RECOMMENDATIONS: Patient has made fair overall progress towards remaining goals in therapy. Emphasis placed on safety and sequencing with [employer] as patient declined to attempt ambulation with rollator/walker.  He continues to remain challenged and demonstrate decreased safety awareness with transfers and will stand with decreased anterior weight shift and sits with poor eccentric control improved with verbal cues. He continues to demonstrate impaired standing posture (with hip flex tightness/contracture as contributing factors) and posterior kinetic chain weakness contributing to back pain with prolonged standing/ambulation with cues for upright posture. Patient's spouse continues to report patient will attempt to hold onto walls/furniture for assistance with ambulation. Patient with increased difficulty with dual tasking along with decreased scanning during ambulation limiting safety with walking. Patient may benefit from behavioral health interventions to manage symptoms of depression and lack of motivation to perform activities as spouse reports patient will soil himself rather than attempt to get up to use restroom. Patient and spouse educated heavily on importance of increased activity at home and performance of activities patient enjoys even if activities need to be modified.  At this time, patient is appropriate for discharge from skilled outpatient PT.       [x]Discontinue therapy: []Patient has reached or is progressing toward set goals      []Patient is non-compliant or has abdicated      [x]Due to lack of appreciable progress towards set goals    Ade Pizarro, PT 10/17/2022 11:20 AM

## 2022-10-26 ENCOUNTER — TELEPHONE (OUTPATIENT)
Dept: PHYSICAL THERAPY | Age: 52
End: 2022-10-26

## 2022-11-11 ENCOUNTER — TELEPHONE (OUTPATIENT)
Dept: PHYSICAL THERAPY | Age: 52
End: 2022-11-11

## 2022-12-13 ENCOUNTER — TRANSCRIBE ORDER (OUTPATIENT)
Dept: SCHEDULING | Age: 52
End: 2022-12-13

## 2022-12-13 DIAGNOSIS — G40.019 BENIGN ROLANDIC EPILEPSY, INTRACTABLE (HCC): Primary | ICD-10-CM

## 2023-01-16 ENCOUNTER — HOSPITAL ENCOUNTER (OUTPATIENT)
Dept: LAB | Age: 53
Discharge: HOME | End: 2023-01-16
Payer: MEDICARE

## 2023-01-16 ENCOUNTER — HOSPITAL ENCOUNTER (OUTPATIENT)
Dept: MRI IMAGING | Age: 53
Discharge: HOME | End: 2023-01-16
Payer: MEDICARE

## 2023-01-16 ENCOUNTER — HOSPITAL ENCOUNTER (OUTPATIENT)
Dept: NON INVASIVE DIAGNOSTICS | Age: 53
Discharge: HOME | End: 2023-01-16
Payer: MEDICARE

## 2023-01-16 DIAGNOSIS — G40.019 BENIGN ROLANDIC EPILEPSY, INTRACTABLE (HCC): ICD-10-CM

## 2023-01-16 LAB — XX-LABCORP SPECIMEN COL,LCBCF: NORMAL

## 2023-01-16 PROCEDURE — 95816 EEG AWAKE AND DROWSY: CPT

## 2023-01-16 PROCEDURE — 70551 MRI BRAIN STEM W/O DYE: CPT

## 2023-01-16 PROCEDURE — 99001 SPECIMEN HANDLING PT-LAB: CPT

## 2023-01-16 PROCEDURE — 95819 EEG AWAKE AND ASLEEP: CPT

## 2023-01-21 NOTE — PROCEDURES
[address] [place] [address]   EEG    Name:  Gladys Garcia  MR#:   [id number]  :  1970  ACCOUNT #:  [de-identified]  DATE OF SERVICE:  2023    OUTPATIENT RECORDING    EEG Number:  MV EEG     OUTPATIENT RECORDING:  EEG was ordered by Adam Haddad MD.    REASON FOR EEG:  Evaluation of memory loss. MEDICATIONS:  At the time of recording include melatonin, Remeron, metoprolol, metformin, Tylenol and Pepcid. EEG TECHNIQUE USED:  Standard 10-20 system with 16-channel recording and an EKG. Activation procedure used was photic stimulation. Total duration of the recording was 26 minutes. This is mainly an awake EEG recording. TECHNICAL DIFFICULTIES:  Encountered include lots of movement and EMG artifacts. EEG REPORT:  The background consists of posterior-dominant alpha rhythms at a frequency of 8-9 Hz and attenuate to eye opening. No significant asymmetry. No obvious spikes or sharp wave discharges. No focal slowing. There were some limitations including loss of movement and EMG artifacts during this recording. IMPRESSION:  With the limitation during this recording, there are no *** abnormalities seen and the EEG overall normal.    CLINICAL CORRELATION:  Normal EEG, does not rule out the possibility of seizures or epilepsy.       MD DALE Dorsey/[id number]  D:  2023 12:08  T:  2023 2:01  JOB #:  [id number]

## 2023-02-17 ENCOUNTER — HOME HEALTH ADMISSION (OUTPATIENT)
Age: 53
End: 2023-02-17
Payer: MEDICARE

## 2023-02-18 ENCOUNTER — HOME CARE VISIT (OUTPATIENT)
Age: 53
End: 2023-02-18
Payer: MEDICARE

## 2023-02-18 VITALS
OXYGEN SATURATION: 97 % | RESPIRATION RATE: 18 BRPM | SYSTOLIC BLOOD PRESSURE: 145 MMHG | DIASTOLIC BLOOD PRESSURE: 85 MMHG | TEMPERATURE: 97.9 F | HEART RATE: 88 BPM

## 2023-02-18 PROCEDURE — G0151 HHCP-SERV OF PT,EA 15 MIN: HCPCS

## 2023-02-18 ASSESSMENT — ENCOUNTER SYMPTOMS: DYSPNEA ACTIVITY LEVEL: AFTER AMBULATING MORE THAN 20 FT

## 2023-02-18 NOTE — Clinical Note
CVA with R sided weakness. Patient is a fall risk and per wife has been falling 2x a week. He was able to use a hemiwalker prior to referral but when he got COVID, he has gotten weaker and has difficulty using it. He has an aide that assists daily. Patient's spouse has MS so cannot help that much. I educated them to rearrange the furniture to have a safe pathway for w/c. Please work on gait with AFO for transfers purposes only and w/c for long term mobility.

## 2023-02-19 NOTE — HOME HEALTH
Skilled services/Home bound verification:     Skilled Reason for admission/summary of clinical condition:  MD referral received. Patient with dx of CVA afftecting RUE and RLE. This patient is homebound for the following reasons Requires considerable and taxing effort to leave the home . Caregiver: spouse. Caregiver assists with IADL's. Medications reconciled and all medications are available in the home this visit. High risk medication teaching regarding anticoagulants, hyperglycemic agents or opiod narcotics performed (specify) janumet for risk of hypoglycemia, xarelto for risk of bleeding     Dr. Arie Cha notified of any discrepancies/look a like medications/medication interactions no severe interaction noted . Home health supplies by type and quantity ordered/delivered this visit include: none     Patient education provided this visit to include: HEP, fall prevention, dc planning . Patient level of understanding of education provided: Patient was able to partially teach back fall prevention     Sharps Education Provided: Clinician instructed patient/CG on proper disposal of sharps: Containers should be made of hard plastic, be puncture-resistant and leakproof,   such as a laundry detergent or bleach bottle.  When the container is ¾ full, it should be sealed with tape and labeled   DO NOT RECYCLE prior to discarding in the regular trash.      Patient response to procedure performed:  Patient needed visual cues for HEP     Home exercise program/Homework provided: patient educated with HEP including seated hip flex, knee extension, hip abduction, hip adduction, ankle PF and DF and ball squeeze x 20 reps x 3 sets daily to improve MMT on BLE to facilitate with improved bed mobility, transfers and gait with AD. patient also educated with deep breathing exercises to be done daily x 10 reps x 3 sets to prevent SOB during activity.  Patient educated with fall prevention technique by decluttering space, proper use of AD and footwear    Pt/Caregiver instructed on plan of care and are agreeable to plan of care at this time. Physician Dr. Jay Brewer notified of patient admission to home health and plan of care including anticipated frequency of 1w1 2w4 for PT     Discharge planning discussed with patient and caregiver. Discharge planning as follows: dc to family with MD supervision when all goals are met . Pt/Caregiver did verbalize understanding of discharge planning. Next MD appointment TBD  Patient/caregiver encouraged/instructed to keep appointment as lack of follow through with physician appointment could result in discontinuation of home care services for non-compliance. PMHx: CVA, DM II, HTN, HLD   S: \"I fall a lot\"  O:Patients Goals= Be able to go back to PLOF  Wound/Incision: location, description, drainage: none   PLOF: Lives in a 1 level house with spouse has a ramp to enter main floor, prior to referral, he was having assistance with daily ADL's and IADL's from caregiver daily. Patient has been able to use hemiwalker for mobility independently at home and needed assistance for community mobility   STRENGTH R hip and knee flexor and extensor 3-/5, L hip and knee flexor and extensor 3+/5  BALANCE Tinetti 6/28 high fall risk due to reduced strength on BLE, gait deviation and decreased efficiency of balance reactions. Patient demonstrated poor use of hip and ankle strategy during standing balance activity. Patient requires support from AD at all times for safety and stability. GAIT Patient was able to ambulate with hemiwalker with Mod A with noted drop foot on RLE. Educated with importance of using AFO to prevent drop foot.  Educated with maintenance of upright posture to prevent LOB and falls   BED MOBILITY Patient needed Min A x1 with bed mobility   TRANSFERS Patient needed Mod A x1 using hemiwalker to and from bed, chair and toilet   A: PT evaluation completed with the presence of  spouse who is the primary CG, POC established, Med rec done, HEP established  P:Home Safety eval/recommendations: Home health physical therapy initial evaluation performed. Patient demonstrates decreased strength, balance, and endurance which increases patient's overall fall risk and burden of care. Patient would benefit from home health physical therapy to improve balance, strength, and endurance which would decrease fall risk and allow patient to return to prior level of function once all functional goals or full rehab potential is met. patient educated with HEP including seated hip flex, knee extension, hip abduction, hip adduction, ankle PF and DF and ball squeeze x 20 reps x 3 sets daily to improve MMT on BLE to facilitate with improved bed mobility, transfers and gait with AD. patient also educated with deep breathing exercises to be done daily x 10 reps x 3 sets to prevent SOB during activity.  Patient educated with fall prevention technique by decluttering space, proper use of AD and footwear

## 2023-02-20 ENCOUNTER — HOME CARE VISIT (OUTPATIENT)
Age: 53
End: 2023-02-20
Payer: MEDICARE

## 2023-02-20 ENCOUNTER — TELEPHONE (OUTPATIENT)
Facility: HOSPITAL | Age: 53
End: 2023-02-20

## 2023-02-20 PROCEDURE — G0157 HHC PT ASSISTANT EA 15: HCPCS

## 2023-02-21 ENCOUNTER — HOME CARE VISIT (OUTPATIENT)
Age: 53
End: 2023-02-21
Payer: MEDICARE

## 2023-02-21 VITALS
RESPIRATION RATE: 17 BRPM | TEMPERATURE: 98.1 F | DIASTOLIC BLOOD PRESSURE: 88 MMHG | OXYGEN SATURATION: 95 % | SYSTOLIC BLOOD PRESSURE: 148 MMHG | HEART RATE: 104 BPM

## 2023-02-21 NOTE — HOME HEALTH
SUBJECTIVE: Pt states that he is doing fair today, notes that he has no new complaints or concerns. Denies any falls, pain or changes in medication at this time. CAREGIVER INVOLVEMENT/ASSISTANCE NEEDED FOR: Pt lives in a one story home with his wife who he is primarily reliant on for completion of ADL's such as cooking, cleaning, bathing and dressing. OBJECTIVE:  See interventions. PATIENT RESPONSE TO TREATMENT:  Pt encouraged by her tolerance to PT today, she was able to participate more in PT than she had previously thought possible. Reported improved confidence following PT today. PATIENT LEVEL OF UNDERSTANDING OF EDUCATION PROVIDED: Pt and caregiver educatged on importance of HEP and how often to complete to maintain complaince. Educated on awareness of bed sores and to perform weight shifting every 2 hours to decrease chance at this. Educated pt and caregiver on safety awareness of gait and transfers to mainly involve use of wheelchair. ASSESSMENT OF PROGRESS TOWARD GOALS: Pt was seen for PT follow up session for transfer training and gait training. Gait completed for short distance inside of home with use of dung walker with MIN A x1 with constant gurading and assistance for transistioning R LE to take a step. 10ft completed in total with two full turns to the right completed. He demonstrated increased fatigue upon completion. Also required cuing for motor planning and upright posture during gait. Sit to stand x3 with MOD A x1 to reach stance today, pt sits in a very low couch and is very difficult for pt to reach stance from here. Advised pt and caregiver on importance providing a more stable surfaces for patient to transistion to stance from. HOME EXERCISE PROGRAM: Pt encouraged to continue to remain compliant with HEP to this point.      THE FOLLOWING DISCHARGE PLANNING WAS DISCUSSED WITH THE PATIENT/CAREGIVER: Pt to be D/C from [facility] PT when all goals have been met or progressed well towards. PLAN FOR NEXT VISIT: Cont PT PoC with focus on progressing pt's safety with transfers and gait.

## 2023-02-28 ENCOUNTER — HOME CARE VISIT (OUTPATIENT)
Age: 53
End: 2023-02-28
Payer: MEDICARE

## 2023-02-28 PROCEDURE — G0157 HHC PT ASSISTANT EA 15: HCPCS

## 2023-03-01 VITALS
RESPIRATION RATE: 17 BRPM | SYSTOLIC BLOOD PRESSURE: 142 MMHG | DIASTOLIC BLOOD PRESSURE: 88 MMHG | HEART RATE: 76 BPM | TEMPERATURE: 98.1 F | OXYGEN SATURATION: 98 %

## 2023-03-02 ENCOUNTER — HOME CARE VISIT (OUTPATIENT)
Age: 53
End: 2023-03-02
Payer: MEDICARE

## 2023-03-02 PROCEDURE — G0151 HHCP-SERV OF PT,EA 15 MIN: HCPCS

## 2023-03-07 ENCOUNTER — HOME CARE VISIT (OUTPATIENT)
Age: 53
End: 2023-03-07
Payer: MEDICARE

## 2023-03-07 PROCEDURE — G0157 HHC PT ASSISTANT EA 15: HCPCS

## 2023-03-07 NOTE — HOME HEALTH
SUBJECTIVE: Pt states that he is doing fair today, notes that he has no new complaints or concerns. Denies any falls, pain or changes in medication at this time. CAREGIVER INVOLVEMENT/ASSISTANCE NEEDED FOR: Pt lives in a one story home with his wife who he is primarily reliant on for completion of ADL's such as cooking, cleaning, bathing and dressing. OBJECTIVE:  See interventions. PATIENT RESPONSE TO TREATMENT:  Pt encouraged by her tolerance to PT today, she was able to participate more in PT than she had previously thought possible. Reported improved confidence following PT today. PATIENT LEVEL OF UNDERSTANDING OF EDUCATION PROVIDED: Pt and caregiver given education on importance of slow eccentric lowering for safety of sit to stand stransfers. Educated on correct way to don and doff AFO. Pt and caregiver verbalized understanding of all education today. ASSESSMENT OF PROGRESS TOWARD GOALS:  Expanded Text  -----------------------  POST FALL:   Date and Time of Fall: 2/25/23 3am  SOC/FAIZA Date: 2/23/23  Fall observed by [facility] Staff? No  Describe Event and Document any re-training or treatment plan modifications indicated:  Pt slid from couch to floor and wife found the patient laying supine on the floor the next morning. He was unable to be helped up from wife or neighbor and caregiver was not available. He then laid there for 2 days until caregiver came through the door the following monday. Response to re-training or treatment plan modifications: Educated pt and caregiver on ways to keep patient safe inside of home, and importance of use of lift chair when aide from caregivers is nogt available. Assisted Devices used by patient prior to fall: Isaac walker   Was equipment in use at time of fall? (Yes / No) No  Injury (Yes / No), If yes, describe:  No  Emergent Care Received: (Yes / No), if yes, describe: Np  Was patient identified as High Risk for falls?  (Yes / No) Yes   List Tests Performed, Scores of Tests, and Patient Risk Factors: N/A  MD Notified (name and time): Yes, 2/25/23    HOME EXERCISE PROGRAM: Pt encouraged to continue to remain compliant with HEP to this point. THE FOLLOWING DISCHARGE PLANNING WAS DISCUSSED WITH THE PATIENT/CAREGIVER: Pt to be D/C from [facility] PT when all goals have been met or progressed well towards. PLAN FOR NEXT VISIT: Cont PT PoC with focus on progressing pt's safety with transfers and gait.

## 2023-03-08 VITALS
TEMPERATURE: 98.4 F | OXYGEN SATURATION: 97 % | DIASTOLIC BLOOD PRESSURE: 88 MMHG | SYSTOLIC BLOOD PRESSURE: 140 MMHG | HEART RATE: 71 BPM | RESPIRATION RATE: 18 BRPM

## 2023-03-08 VITALS
RESPIRATION RATE: 17 BRPM | TEMPERATURE: 98.4 F | HEART RATE: 76 BPM | OXYGEN SATURATION: 98 % | DIASTOLIC BLOOD PRESSURE: 84 MMHG | SYSTOLIC BLOOD PRESSURE: 122 MMHG

## 2023-03-08 NOTE — HOME HEALTH
Post fall/sup-  SUBJECTIVE: Pt sitting in his lift chair as suggested by Lucy Failing upon arrival. Pts wife recounted what occurred over the weekend with pt being found on the floor and decling to get back up when help came by. States he slid to the floor out of the couch recliner and decided to make himself comfotable on the floor and watch TV. Reports a friend came by to asisst getting him up but was unsuccessful d/t being dressed in his good clothing and pt was soiled. he came back the next day and they were able to lift him back up off the floor. Pt denies any injuries. Denies pain or med changes  CAREGIVER INVOLVEMENT/ASSISTANCE NEEDED FOR: Pts wife assists with meals, medications and transportation to appointments  [address] ORDERED/DELIVERED THIS VISIT INCLUDE: None  OBJECTIVE:  See interventions. MEDICATIONS: Medications reconciled and all medications are available in the home this visit. The following education was provided regarding medications, medication interactions, and look a like medications: Continue medication as prescribed by MD. Medications are effective at this time. PATIENT RESPONSE TO TREATMENT:  Pt reportd fatigue after trying to get self up off the floor during floor recovery training. PATIENT LEVEL OF UNDERSTANDING OF EDUCATION PROVIDED: Instructed pt and wife on using nonemergency number if pt has a fall and is unable to get up off the floor instead of laying there d/t increased risk of pressure related injuries. Pts wife verbalized understanding  ASSESSMENT OF PROGRESS TOWARD GOALS: Pt is making slow progress with ambulation goals. Instructed in floor recovery strategies and pt unable to get self off the floor without max A of another person.  Pt is becoming more accepting of PT recommendations such as using lift chair instead of sitting on low couch for easier and safer transfers  [address]: Pt continues to have weakness requirng assistance with transfers and ambulation.  PLAN FOR NEXT VISIT: Progress strengtheing exercises and initiate ramp training for improved independence and safety with exiting home  THE FOLLOWING DISCHARGE PLANNING WAS DISCUSSED WITH THE PATIENT/CAREGIVER: DC to family under MD supervision when goals met/max rehab potential

## 2023-03-08 NOTE — HOME HEALTH
SUBJECTIVE: Pt states that he is doing fair today, notes that he has no new complaints or concerns. Wife reports that patient took a fall over the weekend. See assessment for mechanism of fall. Pt and caregiver note n injury from this. CAREGIVER INVOLVEMENT/ASSISTANCE NEEDED FOR: Pt lives in a one story home with his wife who he is primarily reliant on for completion of ADL's such as cooking, cleaning, bathing and dressing. OBJECTIVE:  See interventions. PATIENT RESPONSE TO TREATMENT:  Pt encouraged by her tolerance to PT today, she was able to participate more in PT than she had previously thought possible. Reported improved confidence following PT today. PATIENT LEVEL OF UNDERSTANDING OF EDUCATION PROVIDED: Pt and caregiver given education on importance of fall prevention and safety inside of home. Educated on safest techniques for safety of transferring from bathrom to stance today. ASSESSMENT OF PROGRESS TOWARD GOALS:    Date and Time of Fall: 2/25/23 3am  SOC/FAIZA Date: 2/23/23  Fall observed by [facility] Staff? No  Describe Event and Document any re-training or treatment plan modifications indicated:  Pt slid from couch to floor and wife found the patient laying supine on the floor the next morning. He was unable to be helped up from wife or neighbor and caregiver was not available. He then laid there for 2 days until caregiver came through the door the following monday. Response to re-training or treatment plan modifications: Educated pt and caregiver on ways to keep patient safe inside of home, and importance of use of lift chair when aide from caregivers is nogt available. Assisted Devices used by patient prior to fall: Isaac walker   Was equipment in use at time of fall? (Yes / No) No  Injury (Yes / No), If yes, describe:  No  Emergent Care Received: (Yes / No), if yes, describe: Np  Was patient identified as High Risk for falls?  (Yes / No) Yes   List Tests Performed, Scores of Tests, and Patient Risk Factors: N/A  MD Notified (name and time): Yes, 2/25/23    HOME EXERCISE PROGRAM: Pt encouraged to continue to remain compliant with HEP to this point. THE FOLLOWING DISCHARGE PLANNING WAS DISCUSSED WITH THE PATIENT/CAREGIVER: Pt to be D/C from [facility] PT when all goals have been met or progressed well towards. PLAN FOR NEXT VISIT: Cont PT PoC with focus on progressing pt's safety with transfers and gait.

## 2023-03-09 ENCOUNTER — HOME CARE VISIT (OUTPATIENT)
Age: 53
End: 2023-03-09
Payer: MEDICARE

## 2023-03-09 PROCEDURE — G0157 HHC PT ASSISTANT EA 15: HCPCS

## 2023-03-13 VITALS
OXYGEN SATURATION: 98 % | HEART RATE: 76 BPM | RESPIRATION RATE: 17 BRPM | SYSTOLIC BLOOD PRESSURE: 128 MMHG | TEMPERATURE: 98.2 F | DIASTOLIC BLOOD PRESSURE: 84 MMHG

## 2023-03-14 ENCOUNTER — HOME CARE VISIT (OUTPATIENT)
Age: 53
End: 2023-03-14
Payer: MEDICARE

## 2023-03-14 VITALS
OXYGEN SATURATION: 98 % | TEMPERATURE: 98 F | SYSTOLIC BLOOD PRESSURE: 114 MMHG | DIASTOLIC BLOOD PRESSURE: 78 MMHG | RESPIRATION RATE: 17 BRPM | HEART RATE: 74 BPM

## 2023-03-14 PROCEDURE — G0157 HHC PT ASSISTANT EA 15: HCPCS

## 2023-03-14 NOTE — HOME HEALTH
CAREGIVER INVOLVEMENT/ASSISTANCE NEEDED FOR: Pt lives in a one story home with his wife who he is primarily reliant on for completion of ADL's such as cooking, cleaning, bathing and dressing. OBJECTIVE:  See interventions. PATIENT RESPONSE TO TREATMENT:  Pt encouraged by her tolerance to PT today, she was able to participate more in PT than she had previously thought possible. Reported improved confidence following PT today. PATIENT LEVEL OF UNDERSTANDING OF EDUCATION PROVIDED: Pt and caregiver given education on importance of fall prevention and safety inside of home. Educated on safest techniques for safety of transferring from bathrom to stance today. ASSESSMENT OF PROGRESS TOWARD GOALS:  Expanded Text  -----------------------  POST FALL:   Date and Time of Fall: 3/7/23 11am  SOC/FAIZA Date: 2/23/23  Fall observed by [facility] Staff? No  Describe Event and Document any re-training or treatment plan modifications indicated:  Pt was ambulating to fron door without supervision, he notes he took a look at the television and stumbled. He was unable to regain his balance, and fell straight back. He notes no injury and neither does his wife or caregiver. Caregiver notes that he was able to lift the patient back to stance. Response to re-training or treatment plan modifications: Re-viewed the importance of safety with gait training at any point inside of home. Educated pt and caregiver on not having patient ambulate inside of home without supervison. Assisted Devices used by patient prior to fall: Isaac-walker  Was equipment in use at time of fall? (Yes / No) Yes  Injury (Yes / No), If yes, describe: No   Emergent Care Received: (Yes / No), if yes, describe: No   Was patient identified as High Risk for falls?  (Yes / No) Yes   List Tests Performed, Scores of Tests, and Patient Risk Factors: N/A   MD Notified (name and time): Yes 3/9/23  SUBJECTIVE: Pt states that he is doing fair today, notes that he has no new complaints or concerns. Wife reports that patient took a fall over the weekend. See assessment for mechanism of fall. Pt and caregiver note n injury from this. HOME EXERCISE PROGRAM: Pt encouraged to continue to remain compliant with HEP to this point. THE FOLLOWING DISCHARGE PLANNING WAS DISCUSSED WITH THE PATIENT/CAREGIVER: [facility] all goals have been met or progressed well towards. PLAN FOR NEXT VISIT: Cont PT PoC with focus on progressing pt's safety with transfers and gait.

## 2023-03-14 NOTE — Clinical Note
ASSESSMENT OF PROGRESS TOWARD GOALS: Pt has fallen three times since beggining PT. Patient beleives that he is functinally indpendent and oft will walk and try to transfer without supervision. This creates a large fall risk and has created multiple falls. Pt appears to be the most safe with transfers to and from wheelchair at this time. Caregiver, Wife and patient do not agree with this, and this also creates fall risk. Ample education has been provided to patient, caregiver and wife and appears nothing has been followed. For this reason it is unfit for therapy to continue as patient and caregiver note they have seen not benefit from continued PT. 5xsit to stand completed today= 45secs with MIN A x1   TUG= 3mins 9secs with MIN A x1 needed for transistion of R LE   Ramp transfer completed with MOD-MAX Ax1 to complete up and down. Sit to stands= MIN A x1  Wheelchair mobility= MIN A X1  Stand pivot transfers= MIN A x1   Gait= MIN-MOD Ax1 with use of dung walker.    Toilet transfer= MIN-MOD Ax1

## 2023-03-14 NOTE — Clinical Note
Thank you  ----- Message -----  From: Christine Crowley, MARIELENA  Sent: 3/14/2023   8:35 PM EDT  To: Rossy Ewing PT      ASSESSMENT OF PROGRESS TOWARD GOALS: Pt has fallen three times since beggining PT. Patient beleives that he is functinally indpendent and oft will walk and try to transfer without supervision. This creates a large fall risk and has created multiple falls. Pt appears to be the most safe with transfers to and from wheelchair at this time. Caregiver, Wife and patient do not agree with this, and this also creates fall risk. Ample education has been provided to patient, caregiver and wife and appears nothing has been followed. For this reason it is unfit for therapy to continue as patient and caregiver note they have seen not benefit from continued PT. 5xsit to stand completed today= 45secs with MIN A x1   TUG= 3mins 9secs with MIN A x1 needed for transistion of R LE   Ramp transfer completed with MOD-MAX Ax1 to complete up and down. Sit to stands= MIN A x1  Wheelchair mobility= MIN A X1  Stand pivot transfers= MIN A x1   Gait= MIN-MOD Ax1 with use of dung walker.    Toilet transfer= MIN-MOD Ax1

## 2023-03-15 NOTE — HOME HEALTH
SUBJECTIVE: Pt states that he is doing well today, notes that he had a good weekend and did not experince any falls over the weekend. Denies any pain or changes in medication at this time. CAREGIVER INVOLVEMENT/ASSISTANCE NEEDED FOR: Pt still reliant on assistance from caregiver and wife for completion of ADL's such as cooking, cleaning, bathing and dressing. OBJECTIVE:  See interventions. PATIENT RESPONSE TO TREATMENT:  Pt encouraged by his tolerance to PT today, she was able to participate more in PT than he had previously thought possible. Reported improved confidence following PT today. PATIENT LEVEL OF UNDERSTANDING OF EDUCATION PROVIDED: Pt and caregiver provided education today on D/C planning and expectations from continued HEP and areas to focus their time and effort. All questions answered upon leaving todays session. Both pt and caregiver verbalized understanding of all above. ASSESSMENT OF PROGRESS TOWARD GOALS: Pt has fallen three times since beggining PT. Patient beleives that he is functinally indpendent and oft will walk and try to transfer without supervision. This creates a large fall risk and has created multiple falls. Pt appears to be the most safe with transfers to and from wheelchair at this time. Caregiver, Wife and patient do not agree with this, and this also creates fall risk. Ample education has been provided to patient, caregiver and wife and appears nothing has been followed. For this reason it is unfit for therapy to continue as patient and caregiver note they have seen not benefit from continued PT. 5xsit to stand completed today= 45secs with MIN A x1   TUG= 3mins 9secs with MIN A x1 needed for transistion of R LE   Ramp transfer completed with MOD-MAX Ax1 to complete up and down. Sit to stands= MIN A x1  Wheelchair mobility= MIN A X1  Stand pivot transfers= MIN A x1   Gait= MIN-MOD Ax1 with use of dung walker.    Toilet transfer= MIN-MOD Ax1     HOME EXERCISE PROGRAM: Pt encouraged to continue to remain compliant with HEP established during his time in PT.     THE FOLLOWING DISCHARGE PLANNING WAS DISCUSSED WITH THE PATIENT/CAREGIVER: Pt to be seen by supervising PT next session and at that time expect D/C from [facility] PT.     PLAN FOR NEXT VISIT: D/C from PT

## 2023-03-16 ENCOUNTER — HOME CARE VISIT (OUTPATIENT)
Age: 53
End: 2023-03-16
Payer: MEDICARE

## 2023-03-16 PROCEDURE — G0151 HHCP-SERV OF PT,EA 15 MIN: HCPCS

## 2023-03-21 NOTE — HOME HEALTH
SUBJECTIVE: Pt reports he is doing fine, no new complaints. CG and wife reports patient continues to try and get up by himself and has continued to have falls despite their efforts  CAREGIVER INVOLVEMENT/ASSISTANCE NEEDED FOR: Wife and PCA asissts with meals, medications, ADL's and transportation to appointments  [address] ORDERED/DELIVERED THIS VISIT INCLUDE: none  OBJECTIVE:  See interventions. MEDICATIONS: Medications reconciled and all medications are available in the home this visit. The following education was provided regarding medications, medication interactions, and look a like medications: Continue medication as prescribed by MD. Medications are effective at this time. PATIENT RESPONSE TO TREATMENT:  No complaints after treatment/assessment  PATIENT LEVEL OF UNDERSTANDING OF EDUCATION PROVIDED: Pt/CG verbalized understanding of discharge at this time as pt has reached max rehab potential. They are wishing to continue with PT on an outpatient basis and PCA has demonstrated ability to get pt safely in/out of car for transportation to such appointments  ASSESSMENT OF PROGRESS TOWARD GOALS: Patient has met most goals and has reached max rehab potential for in home rehab. Patient may benefit from continued PT on an outpatient basis where they can safely progress his balance trianing to improve his safe mobility and decrease his occurances of falls.  Request sent to Dr. Selma Hazel office for referral to outpatient PT.  THE FOLLOWING DISCHARGE PLANNING WAS DISCUSSED WITH THE PATIENT/CAREGIVER: DC to family under MD supervision when goals met/max rehab potential

## 2024-02-16 ENCOUNTER — HOSPITAL ENCOUNTER (OUTPATIENT)
Facility: HOSPITAL | Age: 54
Setting detail: RECURRING SERIES
Discharge: HOME | End: 2024-02-19
Payer: MEDICARE

## 2024-02-16 PROCEDURE — 97162 PT EVAL MOD COMPLEX 30 MIN: CPT

## 2024-02-16 NOTE — PROGRESS NOTES
PHYSICAL / OCCUPATIONAL THERAPY - DAILY TREATMENT NOTE    Patient Name: Karri Valencia    Date: 2024    : 1970  Insurance: Payor: [facility] MEDICARE / Plan: ANTH DUAL ADVANTAGE / Product Type: *No Product type* /      Patient  verified Yes     Visit #   Current / Total 1 10   Time   In / Out 1130 1210   Pain   In / Out 7 7   Subjective Functional Status/Changes: See POC     TREATMENT AREA =  Other abnormalities of gait and mobility [R26.89]  Muscle weakness (generalized) [M62.81]    OBJECTIVE    40 min [x]Eval - untimed                             Therapeutic Procedures:    Tx Min Billable or 1:1 Min (if diff from Tx Min) Procedure, Rationale, Specifics          Details if applicable:              Details if applicable:            Details if applicable:            Details if applicable:            Details if applicable:       [facility] Totals Reminder: bill using total billable min of TIMED therapeutic procedures (example: do not include dry needle or estim unattended, both untimed codes, in totals to left)  8-22 min = 1 unit; 23-37 min = 2 units; 38-52 min = 3 units; 53-67 min = 4 units; 68-82 min = 5 units   Total Total     [x]  Patient Education billed concurrently with other procedures   [x] Review HEP    [] Progressed/Changed HEP, detail:    [] Other detail:       Objective Information/Functional Measures/Assessment    See POC    Patient will continue to benefit from skilled PT / OT services to modify and progress therapeutic interventions, analyze and address functional mobility deficits, analyze and address ROM deficits, analyze and address strength deficits, analyze and address soft tissue restrictions, analyze and cue for proper movement patterns, analyze and modify for postural abnormalities, analyze and address imbalance/dizziness, and instruct in home and community integration to address functional deficits and attain remaining goals.    Progress toward goals / Updated goals:  []  See Progress 
mobility, decrease transfer abilities , and other Increased tone at right side, impaired motor control of right side.    Treatment Plan may include any combination of the followin Therapeutic Exercise, 42082 Neuromuscular Re-Education, 91243 Manual Therapy, 32951 Therapeutic Activity, 72095 Self Care/Home Management, 79237 Electrical Stim unattended, and 57541 Gait Training; Hot/Cold modalities PRN.   Patient / Family readiness to learn indicated by: asking questions, trying to perform skills, interest, return verbalization , and return demonstration   Persons(s) to be included in education: patient (P)  Barriers to Learning/Limitations: sensory deficits-vision/hearing/speech and physical secondary to chronic stroke  Measures taken if barriers to learning present: use of aid for communication as needed.   Patient Goal (s):  \"To get better at walking longer distances.\"  Patient Self Reported Health Status: fair  Rehabilitation Potential: fair    Short Term Goals: To be accomplished in 2 weeks      Pt will be independent with HEP to facilitate carry-over of functional gains made in PT at home & community.     Eval: established at eval, discussed with aid    Long Term Goals: To be accomplished in 10 treatments    Pt will be able to improve strength in bilateral Hip flexion & knee extension to at least 5/5 to improve patient's ability to ambulate longer distances at home & community.    Eval: Hip flexion: Left = 4/5, Right = 4/5; Knee Extension: Right = 3/5, left = 5/5    2. Pt will improve FOTO score to 58 to demonstrate improvement in patient's ability to perform unrestricted ADLs/IADLs at home & community.   Eval: 52    3. Pt will improve TUG score to at least 36 seconds to demonstrate a decrease in patients falls risk.    Eval: 41 seconds with dung-walker    4. Pt will improve her 5 time sit to stand time to least 25 seconds with improved eccentric control to demonstrate improvement in her functional LE

## 2024-03-01 ENCOUNTER — HOSPITAL ENCOUNTER (OUTPATIENT)
Facility: HOSPITAL | Age: 54
Setting detail: RECURRING SERIES
Discharge: HOME | End: 2024-03-04
Payer: MEDICARE

## 2024-03-01 PROCEDURE — 97530 THERAPEUTIC ACTIVITIES: CPT

## 2024-03-01 PROCEDURE — 97110 THERAPEUTIC EXERCISES: CPT

## 2024-03-01 PROCEDURE — 97112 NEUROMUSCULAR REEDUCATION: CPT

## 2024-03-01 NOTE — PROGRESS NOTES
min = 5 units   Total Total     [x]  Patient Education billed concurrently with other procedures   [x] Review HEP    [] Progressed/Changed HEP, detail:    [] Other detail:       Objective Information/Functional Measures/Assessment    Pt tolerating exercises well today; continues to require CGA for all balance activity. Exercises today focusing on improving dynamic stability and 1/2 stance stability. He has difficulty with lifting right due to residual hemiparesis of right side. When perform LAQ he had difficulty bending his knee due to extensor tone. Noting fatigue towards end of his session. Continue to work on dynamic balance and eccentric lowering when into chair. Needed to guard chair    Patient will continue to benefit from skilled PT / OT services to modify and progress therapeutic interventions, analyze and address functional mobility deficits, analyze and address ROM deficits, analyze and address strength deficits, analyze and address soft tissue restrictions, analyze and cue for proper movement patterns, analyze and modify for postural abnormalities, analyze and address imbalance/dizziness, and instruct in home and community integration to address functional deficits and attain remaining goals.    Progress toward goals / Updated goals:  []  See Progress Note/Recertification    Short Term Goals: To be accomplished in 2 weeks        Pt will be independent with HEP to facilitate carry-over of functional gains made in PT at home & community.               Eval: established at eval, discussed with aid    Tolerating exercises well  Long Term Goals: To be accomplished in 10 treatments     Pt will be able to improve strength in bilateral Hip flexion & knee extension to at least 5/5 to improve patient's ability to ambulate longer distances at home & community.               Eval: Hip flexion: Left = 4/5, Right = 4/5; Knee Extension: Right = 3/5, left = 5/5   Tolerating exercises well     2. Pt will improve FOTO

## 2024-03-04 ENCOUNTER — HOSPITAL ENCOUNTER (OUTPATIENT)
Facility: HOSPITAL | Age: 54
Setting detail: RECURRING SERIES
Discharge: HOME | End: 2024-03-07
Payer: MEDICARE

## 2024-03-04 PROCEDURE — 97112 NEUROMUSCULAR REEDUCATION: CPT

## 2024-03-04 PROCEDURE — 97530 THERAPEUTIC ACTIVITIES: CPT

## 2024-03-04 PROCEDURE — 97110 THERAPEUTIC EXERCISES: CPT

## 2024-03-04 NOTE — PROGRESS NOTES
PHYSICAL / OCCUPATIONAL THERAPY - DAILY TREATMENT NOTE    Patient Name: Karri Valencia    Date: 3/4/2024    : 1970  Insurance: Payor: [employer] MEDICARE / Plan: SUSANNAH DUAL ADVANTAGE / Product Type: *No Product type* /      Patient  verified Yes     Visit #   Current / Total 3 10   Time   In / Out 1052 1130   Pain   In / Out 0 0   Subjective Functional Status/Changes: Pt reporting that he wasn't sore after last visit.      TREATMENT AREA =  Other abnormalities of gait and mobility [R26.89]  Muscle weakness (generalized) [M62.81]    OBJECTIVE         Therapeutic Procedures:    Tx Min Billable or 1:1 Min (if diff from Tx Min) Procedure, Rationale, Specifics   8 8 12321 Therapeutic Exercise (timed):  increase ROM, strength, coordination, balance, and proprioception to improve patient's ability to progress to PLOF and address remaining functional goals. (see flow sheet as applicable)     Details if applicable:       10 10 64219 Neuromuscular Re-Education (timed):  improve balance, coordination, kinesthetic sense, posture, core stability and proprioception to improve patient's ability to develop conscious control of individual muscles and awareness of position of extremities in order to progress to PLOF and address remaining functional goals. (see flow sheet as applicable)     Details if applicable:      28473 Therapeutic Activity (timed):  use of dynamic activities replicating functional movements to increase ROM, strength, coordination, balance, and proprioception in order to improve patient's ability to progress to PLOF and address remaining functional goals.  (see flow sheet as applicable)     Details if applicable:     38 38 [facility] Totals Reminder: bill using total billable min of TIMED therapeutic procedures (example: do not include dry needle or estim unattended, both untimed codes, in totals to left)  8-22 min = 1 unit; 23-37 min = 2 units; 38-52 min = 3 units; 53-67 min = 4 units; 68-82 min = 5 units

## 2024-03-06 ENCOUNTER — HOSPITAL ENCOUNTER (OUTPATIENT)
Facility: HOSPITAL | Age: 54
Setting detail: RECURRING SERIES
Discharge: HOME | End: 2024-03-09
Payer: MEDICARE

## 2024-03-06 PROCEDURE — 97112 NEUROMUSCULAR REEDUCATION: CPT

## 2024-03-06 PROCEDURE — 97530 THERAPEUTIC ACTIVITIES: CPT

## 2024-03-06 PROCEDURE — 97116 GAIT TRAINING THERAPY: CPT

## 2024-03-06 NOTE — PROGRESS NOTES
knee extension however needs tactile cues for proper motor control     2. Pt will improve FOTO score to 58 to demonstrate improvement in patient's ability to perform unrestricted ADLs/IADLs at home & community.              Eval: 52              Current: Assess at next 30  days [Date assessed: 3/4/24];     3. Pt will improve TUG score to at least 36 seconds to demonstrate a decrease in patients falls risk.               Eval: 41 seconds with dung-walker              Tolerating exercises well, assess NV [Date assessed: 03/06/2024]       4. Pt will improve her 5 time sit to stand time to least 25 seconds with improved eccentric control to demonstrate improvement in her functional LE strength & decrease her risk for falls at home.               Eval: 32 with UE support and poor eccentric control              Current: Continues to have trouble controlling ecentric control and using back of knees to assist with task. [Date assessed: 3/4/24]    Next PN/ RC due 03/16/2024  Auth due (visit number/ date) 3 more by 04/15/2024    PLAN  - Continue Plan of Care    Venkata Pitt PTA, [facility]    3/6/2024    10:12 AM    Future Appointments   Date Time Provider Department Center   3/6/2024 10:50 AM [facility]   3/11/2024 10:50 AM Venkata Pitt, PT [facility]   3/13/2024 10:50 AM Venkata Pitt, PT [facility]   3/18/2024 10:50 AM Venkata Pitt PT [facility]   3/20/2024 10:50 AM Venkata Pitt PT [facility]   3/25/2024 10:50 AM Venkata Pitt PT [facility]   3/27/2024 10:50 AM Mick Siu, [facility]

## 2024-03-11 ENCOUNTER — HOSPITAL ENCOUNTER (OUTPATIENT)
Facility: HOSPITAL | Age: 54
Setting detail: RECURRING SERIES
Discharge: HOME | End: 2024-03-14
Payer: MEDICARE

## 2024-03-11 PROCEDURE — 97530 THERAPEUTIC ACTIVITIES: CPT

## 2024-03-11 PROCEDURE — 97112 NEUROMUSCULAR REEDUCATION: CPT

## 2024-03-11 NOTE — PROGRESS NOTES
PHYSICAL / OCCUPATIONAL THERAPY - DAILY TREATMENT NOTE    Patient Name: Karri Valencia    Date: 3/11/2024    : 1970  Insurance: Payor: [facility] MEDICARE / Plan: SUSANNAH DUAL ADVANTAGE / Product Type: *No Product type* /      Patient  verified Yes     Visit #   Current / Total 5 10   Time   In / Out 1050 1136   Pain   In / Out 0 0   Subjective Functional Status/Changes: \"No pain.\"     TREATMENT AREA =  Other abnormalities of gait and mobility [R26.89]  Muscle weakness (generalized) [M62.81]    OBJECTIVE         Therapeutic Procedures:    Tx Min Billable or 1:1 Min (if diff from Tx Min) Procedure, Rationale, Specifics   30 30 50849 Neuromuscular Re-Education (timed):  improve balance, coordination, kinesthetic sense, posture, core stability and proprioception to improve patient's ability to develop conscious control of individual muscles and awareness of position of extremities in order to progress to PLOF and address remaining functional goals. (see flow sheet as applicable)     Details if applicable:     16 10 96750 Therapeutic Activity (timed):  use of dynamic activities replicating functional movements to increase ROM, strength, coordination, balance, and proprioception in order to improve patient's ability to progress to PLOF and address remaining functional goals.  (see flow sheet as applicable)     Details if applicable:     46 40 [facility] Totals Reminder: bill using total billable min of TIMED therapeutic procedures (example: do not include dry needle or estim unattended, both untimed codes, in totals to left)  8-22 min = 1 unit; 23-37 min = 2 units; 38-52 min = 3 units; 53-67 min = 4 units; 68-82 min = 5 units   Total Total     [x]  Patient Education billed concurrently with other procedures   [x] Review HEP    [] Progressed/Changed HEP, detail:    [] Other detail:       Objective Information/Functional Measures/Assessment    Pt continues to have poor safety awareness with sit to stand transfers. Required

## 2024-03-13 ENCOUNTER — APPOINTMENT (OUTPATIENT)
Facility: HOSPITAL | Age: 54
End: 2024-03-13
Payer: MEDICARE

## 2024-03-18 ENCOUNTER — HOSPITAL ENCOUNTER (OUTPATIENT)
Facility: HOSPITAL | Age: 54
Setting detail: RECURRING SERIES
Discharge: HOME | End: 2024-03-21
Payer: MEDICARE

## 2024-03-18 PROCEDURE — 97110 THERAPEUTIC EXERCISES: CPT

## 2024-03-18 PROCEDURE — 97530 THERAPEUTIC ACTIVITIES: CPT

## 2024-03-18 NOTE — PROGRESS NOTES
PHYSICAL / OCCUPATIONAL THERAPY - DAILY TREATMENT NOTE    Patient Name: Karri Valencia    Date: 3/18/2024    : 1970  Insurance: Payor: [facility] MEDICARE / Plan: SUSANNAH DUAL ADVANTAGE / Product Type: *No Product type* /      Patient  verified Yes     Visit #   Current / Total 1 1   Time   In / Out 1050 1132   Pain   In / Out 0 0   Subjective Functional Status/Changes: \"No pain.\"     TREATMENT AREA =  Other abnormalities of gait and mobility [R26.89]  Muscle weakness (generalized) [M62.81]    OBJECTIVE         Therapeutic Procedures:    Tx Min Billable or 1:1 Min (if diff from Tx Min) Procedure, Rationale, Specifics   12 10 23926 Therapeutic Exercise (timed):  increase ROM, strength, coordination, balance, and proprioception to improve patient's ability to progress to PLOF and address remaining functional goals. (see flow sheet as applicable)     Details if applicable:       30 30 72904 Therapeutic Activity (timed):  use of dynamic activities replicating functional movements to increase ROM, strength, coordination, balance, and proprioception in order to improve patient's ability to progress to PLOF and address remaining functional goals.  (see flow sheet as applicable)     Details if applicable:     42 40 [facility] Totals Reminder: bill using total billable min of TIMED therapeutic procedures (example: do not include dry needle or estim unattended, both untimed codes, in totals to left)  8-22 min = 1 unit; 23-37 min = 2 units; 38-52 min = 3 units; 53-67 min = 4 units; 68-82 min = 5 units   Total Total     [x]  Patient Education billed concurrently with other procedures   [x] Review HEP    [] Progressed/Changed HEP, detail:    [] Other detail:       Objective Information/Functional Measures/Assessment    FOTO 82    5x STS = 22 seconds    TUG = 39 seconds    MMT Right Left   Hip Flexion  4/5  5/5     MMT  Right Left   Knee Extension  4/5  5/5       Mr. Valencia reports 50% improvement since beginning PT. Pt

## 2024-03-18 NOTE — PROGRESS NOTES
Physical Therapy Discharge Instructions      [facility] [address]  [address]  [place] [address]  [phone] fax      Patient: Karri Valencia  : 1970      Continue Home Exercise Program 1 time per day       Continue with    [] Ice  as needed     [] Heat           Follow up with MD:     [] Upon completion of therapy     [x] As needed      Recommendations:     [x]   Return to activity with home program    []   Return to activity with the following modifications:       []Post Rehab Program    []Join Independent aquatic program     []Return to/join local gym        Venkata Pitt, PTA, CSCS  3/18/2024 11:26 AM

## 2024-03-20 ENCOUNTER — APPOINTMENT (OUTPATIENT)
Facility: HOSPITAL | Age: 54
End: 2024-03-20
Payer: MEDICARE

## 2024-03-20 NOTE — PROGRESS NOTES
[facility] [address]  [address]  [place] [address]  [phone] fax    CONTINUED PLAN OF CARE/RECERTIFICATION FOR PHYSICAL THERAPY          Patient Name: Karri Valencia : 1970   Treatment/Medical Diagnosis: Other abnormalities of gait and mobility [R26.89]  Muscle weakness (generalized) [M62.81]   Onset Date: Chronic CVA      Referral Source: Hemant Lockett MD Start of Care (SOC): 24   Prior Hospitalization: See Medical History Provider #: [id number]   Prior Level of Function: Modified independent with mobility but has aide to help with self care; 3 JUAN from garage into home but it is wheel chair accessible in the front; Shower chair.    Comorbidities: Hx of CVA (), HTN, Diabetes; Hx of left hip fx and surgery    Visits from SOC: 6 Missed Visits: 0     Progress to Goals:    Short Term Goals: To be accomplished in 2 weeks  Pt will be independent with HEP to facilitate carry-over of functional gains made in PT at home & community.               Eval: established at eval, discussed with aid             NOT MET; re-issued 3/11/2024, Pt reports continued non-compliance with HEP.                  Long Term Goals: To be accomplished in 10 treatments     Pt will be able to improve strength in bilateral Hip flexion & knee extension to at least 5/5 to improve patient's ability to ambulate longer distances at home & community.               Eval: Hip flexion: Left = 4/5, Right = 4/5; Knee Extension: Right = 3/5, left = 5/5              PARTIALLY MET; Hip Flexion: Left 5/5, Right 4/5, Knee Extension: Left 5/5, Right 4/5     2. Pt will improve FOTO score to 58 to demonstrate improvement in patient's ability to perform unrestricted ADLs/IADLs at home & community.              Eval: 52              MET; 82     3. Pt will improve TUG score to at least 36 seconds to demonstrate a decrease in patients falls risk.               Eval: 41 seconds with

## 2024-03-20 NOTE — PROGRESS NOTES
[facility] [address]  [address]  [place] [address]  [phone] fax    DISCHARGE SUMMARY  Patient Name: Karri Valencia : 1970   Treatment/Medical Diagnosis: Other abnormalities of gait and mobility [R26.89]  Muscle weakness (generalized) [M62.81]   Referral Source: Hemant Lockett MD     Date of Initial Visit: 24 Attended Visits: 6 Missed Visits: 0     SUMMARY OF TREATMENT  Mr. Valencia reports 50% improvement since beginning PT. Pt demonstrates an increase in left hip/knee strength, but still some deficits noted in right hip/knee. Remains a high falls risk according to TUG and 5x STS score. Noted poor eccentric control with sit to stands and overall poor safety with dung-walker. Pt reports non-compliance with HEP. Pt states that he lost his HEP so re-issued again and educated pt on importance of continuing to strengthen to avoid losing any gains from PT. We will discharge at this time per patient request as he states he is able to do everything independently at home.   CURRENT STATUS    Short Term Goals: To be accomplished in 2 weeks  Pt will be independent with HEP to facilitate carry-over of functional gains made in PT at home & community.               Eval: established at eval, discussed with aid             NOT MET; re-issued 3/11/2024, Pt reports continued non-compliance with HEP due to early self D/C for independent symptom management                   Long Term Goals: To be accomplished in 10 treatments     Pt will be able to improve strength in bilateral Hip flexion & knee extension to at least 5/5 to improve patient's ability to ambulate longer distances at home & community.               Eval: Hip flexion: Left = 4/5, Right = 4/5; Knee Extension: Right = 3/5, left = 5/5              PARTIALLY MET; Hip Flexion: Left 5/5, Right 4/5, Knee Extension: Left 5/5, Right 4/5     2. Pt will improve FOTO score to 58 to demonstrate improvement in

## 2024-03-25 ENCOUNTER — APPOINTMENT (OUTPATIENT)
Facility: HOSPITAL | Age: 54
End: 2024-03-25
Payer: MEDICARE

## 2024-03-27 ENCOUNTER — APPOINTMENT (OUTPATIENT)
Facility: HOSPITAL | Age: 54
End: 2024-03-27
Payer: MEDICARE
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
## 2025-03-23 NOTE — DIABETES MGMT
Diabetes/ Glycemic Control Plan of Care    Patient was admitted on 01/09/2022 with report of shortness of breath, weakness and recent exposure to COVID-19. Recommendations:   1.) correctional lispro insulin as ordered. 2.) cont on current lantus insulin 30 units daily and very resistant dose of correctional lispro insulin. 3.) cont to monitor BG values and meals of regular with 4 carb choices, sugar free. Called dietary at ext [phone] but no answer. Notified RD for assistance for assistance. 01/12: Patient remain in ED and noted BG values still elevated nursing reported that patient received regular syrup for his breakfast this morning. Assessment:   DX:   1. COVID        Fasting/ Morning blood glucose:   Lab Results   Component Value Date/Time    Glucose 211 (H) 01/13/2022 03:50 AM    Glucose (POC) 376 (H) 01/13/2022 10:16 AM     IV Fluids containing dextrose:  None    Steroids:   Rx Glucocorticoids (24h ago, onward)             Start     Dose Route Frequency Ordered Stop    01/10/22 0057  dexamethasone (DECADRON) 4 mg/mL injection 6 mg         6 mg IV EVERY 12 HOURS 01/10/22 0056 --               Rx Glucocorticoids (24h ago, onward)             Start     Dose Route Frequency Ordered Stop    01/10/22 0057  dexamethasone (DECADRON) 4 mg/mL injection 6 mg         6 mg IV EVERY 12 HOURS 01/10/22 0056 --                 Blood glucose values: Within target range (70-180mg/dL): No    Current insulin orders:   Correctional lispro insulin. Very resistant dose  Basal lantus insulin 30 units daily    Total Daily Dose previous 24 hours: 66 units  Lantus: 30 units  Lispro: 36 units    Current A1c:   Lab Results   Component Value Date/Time    Hemoglobin A1c 7.8 (H) 01/10/2022 05:10 AM      equivalent  to ave Blood Glucose of 177 mg/dl for 2-3 months prior to admission. Adequate glycemic control PTA: No    Nutrition/Diet:   Active Orders   Diet    ADULT DIET Regular; 4 carb choices (60 gm/meal);  Low Fat/Low Chol/High Fiber/2 gm Na      Meal Intake:  No data found. Supplement Intake:  No data found. Home diabetes medications:   Key Antihyperglycemic Medications             SITagliptin-metFORMIN (JANUMET) 50-1,000 mg per tablet Take 1 Tab by mouth two (2) times daily (with meals).           Plan/Goals:   Blood glucose will be within target of 70 - 180 mg/dl within 72 hours       Education:  [] Refer to Diabetes Education Record                       [x] Education not indicated at this time     Yasmine Downey RN  Pager: [phone] Name band;